# Patient Record
Sex: FEMALE | Employment: OTHER | URBAN - METROPOLITAN AREA
[De-identification: names, ages, dates, MRNs, and addresses within clinical notes are randomized per-mention and may not be internally consistent; named-entity substitution may affect disease eponyms.]

---

## 2017-02-04 ENCOUNTER — APPOINTMENT (OUTPATIENT)
Dept: GENERAL RADIOLOGY | Age: 82
End: 2017-02-04
Attending: EMERGENCY MEDICINE
Payer: MEDICARE

## 2017-02-04 ENCOUNTER — HOSPITAL ENCOUNTER (EMERGENCY)
Age: 82
Discharge: HOME | End: 2017-02-04
Attending: EMERGENCY MEDICINE
Payer: MEDICARE

## 2017-02-04 VITALS
RESPIRATION RATE: 18 BRPM | TEMPERATURE: 97.6 F | DIASTOLIC BLOOD PRESSURE: 87 MMHG | HEART RATE: 77 BPM | OXYGEN SATURATION: 95 % | SYSTOLIC BLOOD PRESSURE: 181 MMHG | WEIGHT: 145 LBS | HEIGHT: 61 IN | BODY MASS INDEX: 27.38 KG/M2

## 2017-02-04 DIAGNOSIS — M25.552 ACUTE PAIN OF LEFT HIP: ICD-10-CM

## 2017-02-04 DIAGNOSIS — M25.532 ACUTE WRIST PAIN, LEFT: ICD-10-CM

## 2017-02-04 DIAGNOSIS — S70.12XA CONTUSION OF LEFT HIP AND THIGH, INITIAL ENCOUNTER: ICD-10-CM

## 2017-02-04 DIAGNOSIS — S70.02XA CONTUSION OF LEFT HIP AND THIGH, INITIAL ENCOUNTER: ICD-10-CM

## 2017-02-04 DIAGNOSIS — S52.602A RADIUS AND ULNA DISTAL FRACTURE, LEFT, CLOSED, INITIAL ENCOUNTER: Primary | ICD-10-CM

## 2017-02-04 DIAGNOSIS — S52.502A RADIUS AND ULNA DISTAL FRACTURE, LEFT, CLOSED, INITIAL ENCOUNTER: Primary | ICD-10-CM
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
## 2017-02-04 LAB
ANION GAP BLD CALC-SCNC: 4 MMOL/L (ref 3–18)
APPEARANCE UR: CLEAR
ATRIAL RATE: 75 BPM
BACTERIA URNS QL MICRO: ABNORMAL /HPF
BASOPHILS # BLD AUTO: 0 K/UL (ref 0–0.06)
BASOPHILS # BLD: 0 % (ref 0–2)
BILIRUB UR QL: NEGATIVE
BUN SERPL-MCNC: 12 MG/DL (ref 7–18)
BUN/CREAT SERPL: 13 (ref 12–20)
CALCIUM SERPL-MCNC: 9.1 MG/DL (ref 8.5–10.1)
CALCULATED P AXIS, ECG09: 37 DEGREES
CALCULATED R AXIS, ECG10: 8 DEGREES
CALCULATED T AXIS, ECG11: 8 DEGREES
CHLORIDE SERPL-SCNC: 104 MMOL/L (ref 100–108)
CO2 SERPL-SCNC: 31 MMOL/L (ref 21–32)
COLOR UR: YELLOW
CREAT SERPL-MCNC: 0.95 MG/DL (ref 0.6–1.3)
DIAGNOSIS, 93000: NORMAL
DIFFERENTIAL METHOD BLD: ABNORMAL
EOSINOPHIL # BLD: 0.3 K/UL (ref 0–0.4)
EOSINOPHIL NFR BLD: 4 % (ref 0–5)
EPITH CASTS URNS QL MICRO: ABNORMAL /LPF (ref 0–5)
ERYTHROCYTE [DISTWIDTH] IN BLOOD BY AUTOMATED COUNT: 14.5 % (ref 11.6–14.5)
GLUCOSE SERPL-MCNC: 97 MG/DL (ref 74–99)
GLUCOSE UR STRIP.AUTO-MCNC: NEGATIVE MG/DL
HCT VFR BLD AUTO: 39.7 % (ref 35–45)
HGB BLD-MCNC: 12.7 G/DL (ref 12–16)
HGB UR QL STRIP: ABNORMAL
INR PPP: 4.2 (ref 0.8–1.2)
KETONES UR QL STRIP.AUTO: NEGATIVE MG/DL
LEUKOCYTE ESTERASE UR QL STRIP.AUTO: ABNORMAL
LYMPHOCYTES # BLD AUTO: 6 % (ref 21–52)
LYMPHOCYTES # BLD: 0.5 K/UL (ref 0.9–3.6)
MCH RBC QN AUTO: 28.9 PG (ref 24–34)
MCHC RBC AUTO-ENTMCNC: 32 G/DL (ref 31–37)
MCV RBC AUTO: 90.2 FL (ref 74–97)
MONOCYTES # BLD: 0.6 K/UL (ref 0.05–1.2)
MONOCYTES NFR BLD AUTO: 9 % (ref 3–10)
NEUTS SEG # BLD: 5.8 K/UL (ref 1.8–8)
NEUTS SEG NFR BLD AUTO: 81 % (ref 40–73)
NITRITE UR QL STRIP.AUTO: NEGATIVE
P-R INTERVAL, ECG05: 198 MS
PH UR STRIP: 6 [PH] (ref 5–8)
PLATELET # BLD AUTO: 190 K/UL (ref 135–420)
PMV BLD AUTO: 10.5 FL (ref 9.2–11.8)
POTASSIUM SERPL-SCNC: 4.4 MMOL/L (ref 3.5–5.5)
PROT UR STRIP-MCNC: NEGATIVE MG/DL
PROTHROMBIN TIME: 38.2 SEC (ref 11.5–15.2)
Q-T INTERVAL, ECG07: 390 MS
QRS DURATION, ECG06: 80 MS
QTC CALCULATION (BEZET), ECG08: 435 MS
RBC # BLD AUTO: 4.4 M/UL (ref 4.2–5.3)
RBC #/AREA URNS HPF: ABNORMAL /HPF (ref 0–5)
SODIUM SERPL-SCNC: 139 MMOL/L (ref 136–145)
SP GR UR REFRACTOMETRY: 1.01 (ref 1–1.03)
UROBILINOGEN UR QL STRIP.AUTO: 0.2 EU/DL (ref 0.2–1)
VENTRICULAR RATE, ECG03: 75 BPM
WBC # BLD AUTO: 7.1 K/UL (ref 4.6–13.2)
WBC URNS QL MICRO: ABNORMAL /HPF (ref 0–4)

## 2017-02-04 PROCEDURE — 73100 X-RAY EXAM OF WRIST: CPT

## 2017-02-04 PROCEDURE — 74011250637 HC RX REV CODE- 250/637: Performed by: EMERGENCY MEDICINE

## 2017-02-04 PROCEDURE — 73502 X-RAY EXAM HIP UNI 2-3 VIEWS: CPT

## 2017-02-04 PROCEDURE — 85610 PROTHROMBIN TIME: CPT | Performed by: EMERGENCY MEDICINE

## 2017-02-04 PROCEDURE — L3670 SO ACRO/CLAV CAN WEB PRE OTS: HCPCS

## 2017-02-04 PROCEDURE — 75810000053 HC SPLINT APPLICATION

## 2017-02-04 PROCEDURE — 80048 BASIC METABOLIC PNL TOTAL CA: CPT | Performed by: EMERGENCY MEDICINE

## 2017-02-04 PROCEDURE — 93005 ELECTROCARDIOGRAM TRACING: CPT

## 2017-02-04 PROCEDURE — 81001 URINALYSIS AUTO W/SCOPE: CPT | Performed by: EMERGENCY MEDICINE

## 2017-02-04 PROCEDURE — 85025 COMPLETE CBC W/AUTO DIFF WBC: CPT | Performed by: EMERGENCY MEDICINE

## 2017-02-04 PROCEDURE — 99284 EMERGENCY DEPT VISIT MOD MDM: CPT

## 2017-02-04 RX ORDER — HYDROCODONE BITARTRATE AND ACETAMINOPHEN 5; 325 MG/1; MG/1
1 TABLET ORAL
Qty: 20 TAB | Refills: 0 | Status: SHIPPED | OUTPATIENT
Start: 2017-02-04 | End: 2019-01-31

## 2017-02-04 RX ORDER — HYDROCODONE BITARTRATE AND ACETAMINOPHEN 5; 325 MG/1; MG/1
1 TABLET ORAL
Status: COMPLETED | OUTPATIENT
Start: 2017-02-04 | End: 2017-02-04

## 2017-02-04 RX ORDER — LISINOPRIL 10 MG/1
10 TABLET ORAL DAILY
COMMUNITY
End: 2019-01-31

## 2017-02-04 RX ADMIN — HYDROCODONE BITARTRATE AND ACETAMINOPHEN 1 TABLET: 5; 325 TABLET ORAL at 10:35

## 2017-02-04 NOTE — ED NOTES
Patient and family aware that Xrays are resulted and awaiting MD review. Pt and family verbalized understanding. No needs voiced at this time.

## 2017-02-04 NOTE — ED NOTES
I have reviewed discharge instructions with the patient. The patient verbalized understanding. Current Discharge Medication List      START taking these medications    Details   HYDROcodone-acetaminophen (NORCO) 5-325 mg per tablet Take 1 Tab by mouth every six (6) hours as needed for Pain. Max Daily Amount: 4 Tabs. May break the tablets in half. Qty: 20 Tab, Refills: 0         CONTINUE these medications which have NOT CHANGED    Details   lisinopril (PRINIVIL, ZESTRIL) 10 mg tablet Take 10 mg by mouth daily. warfarin (COUMADIN) 3 mg tablet Take 3 mg by mouth every other day. simvastatin (ZOCOR) 20 mg tablet Take 20 mg by mouth nightly. Indications: DYSLIPIDEMIA      ferrous sulfate 300 mg (60 mg iron)/5 mL syrup Take 5 mL by mouth daily (with breakfast).   Qty: 60 mL, Refills: 0    Associated Diagnoses: Postoperative anemia due to acute blood loss         Patient armband removed and shredded

## 2017-02-04 NOTE — ED PROVIDER NOTES
HPI Comments: 9:14 AM Nba Ramos is a 80 y.o. female with a h/o a left hip fracture repair, DVT, and HTN presents to the ED with complaints of left wrist pain and left hip pain. Her  states that she slipped and fell onto her left side in the  area at [employer] two evenings ago. She states that she doesn't remember the fall. He believes she had a \"dizzy spell\" and does not know if she really slipped or if she passed out prior to falling. She landed on her left side and was awake and alert after the fall. She denies hitting her head. No headache. No neck pain. No unusual back pain. No fever, chest pain, SOB, cough, nausea, vomiting, diarrhea, urinary symptoms, and/or rash. No blood nor fluid from her nose nor ears. Teeth do not feel loose nor chipped. She is also complaining of left hip/leg pain s/p fall. Her  notes that she slept most of yesterday, and believes her \"dizzy spells\" are due to a 'potassium issue\". Her daughter-in-law noted that her mother-in-law is on Warfarin and that she did have a UTI last summer. The patient is a poor historian. No further symptoms or concerns expressed at this time. Pt's PCP is Mikayla Tavares MD            The history is provided by the patient, the spouse and a relative. Past Medical History:   Diagnosis Date    Anticoagulated on Coumadin     Anticoagulation goal of INR 2 to 3     Decreased calculated glomerular filtration rate (GFR) 8/1/2016     Calculated GFR equivalent to that of CKD stage 3 = 30-59 ml/min    DVT (deep venous thrombosis) (HCC)     Dyslipidemia     Essential hypertension     Glaucoma     Osteoporosis     Postoperative anemia due to acute blood loss 7/29/2016       No past surgical history on file.       Family History:   Problem Relation Age of Onset    Heart Disease Mother     Diabetes Mother     Arthritis-osteo Father     Cancer Brother        Social History     Social History    Marital status:      Spouse name: N/A    Number of children: N/A    Years of education: N/A     Occupational History    Not on file. Social History Main Topics    Smoking status: Never Smoker    Smokeless tobacco: Never Used    Alcohol use No    Drug use: No    Sexual activity: Not on file     Other Topics Concern    Not on file     Social History Narrative         ALLERGIES: Review of patient's allergies indicates no known allergies. Review of Systems   Constitutional: Negative for chills and fever. HENT: Negative for nosebleeds. Respiratory: Negative for cough and shortness of breath. Cardiovascular: Negative for chest pain. Gastrointestinal: Negative for diarrhea, nausea and vomiting. Genitourinary: Negative for difficulty urinating, dyspareunia, dysuria, enuresis, flank pain, frequency, hematuria, urgency and vaginal pain. Musculoskeletal: Positive for arthralgias (left wrist and left hip). Neurological: Negative for headaches. All other systems reviewed and are negative. Vitals:    02/04/17 0903   BP: 181/87   Pulse: 77   Resp: 18   Temp: 97.6 °F (36.4 °C)   SpO2: 95%   Weight: 65.8 kg (145 lb)   Height: 5' 1\" (1.549 m)            Physical Exam   Constitutional: She is oriented to person, place, and time. She appears well-developed and well-nourished. No distress. HENT:   Head: Normocephalic. Right Ear: External ear normal.   Left Ear: External ear normal.   Nose: Nose normal.   Mouth/Throat: Oropharynx is clear and moist.   Teeth and bite intact. Tongue intact. Eyes: Conjunctivae and EOM are normal. Pupils are equal, round, and reactive to light. Neck: Normal range of motion. Neck supple. No spinous tenderness. No step off. There is some tenderness of the paravertebral muscles bilaterally. Cardiovascular: Normal rate, regular rhythm and normal heart sounds. Pulmonary/Chest: Effort normal and breath sounds normal.   Abdominal: Soft.  Bowel sounds are normal. She exhibits no distension and no mass. There is no tenderness. Musculoskeletal:        Left wrist: She exhibits tenderness (over the distal radius and the distal ulna), swelling (and ecchymoses ) and deformity. She exhibits normal range of motion (ROM increases the pain. ) and no laceration. Left hip: She exhibits tenderness (laterally). She exhibits normal range of motion, no swelling (there is one small ecchymoses over the lateral aspect of the left buttock. ), no crepitus, no deformity and no laceration. Neurological: She is alert and oriented to person, place, and time. Skin: Skin is warm and dry. She is not diaphoretic. Nursing note and vitals reviewed. MDM  Number of Diagnoses or Management Options  Diagnosis management comments: Pt fell 2 evenings ago. Will obtain x-rays. Likely broke her wrist.  Will remove rings from left fingers. Pt states that the pain in her hip is not as bad as the wrist and not nearly as bad as the pain was when she broke it in the past.    Will also check labs and a urine to rule out anemia, electrolyte disturbance, infection as the cause for her fall. Amount and/or Complexity of Data Reviewed  Clinical lab tests: ordered and reviewed      ED Course       Splint, Long Arm  Performed by: Feliz Viveros  Authorized by: Ge GARCÍA     Consent:     Consent obtained:  Verbal    Consent given by:  Patient    Risks discussed:  Numbness and swelling    Alternatives discussed:  No treatment  Pre-procedure details:     Sensation:  Normal    Skin color:  Pink  Procedure details:     Laterality:  Left    Location:  Wrist    Wrist:  L wrist    Strapping: no      Splint type:  Long arm    Supplies:  Elastic bandage and Ortho-Glass  Post-procedure details:     Pain:  Improved    Sensation:  Normal    Skin color:  Pink    Patient tolerance of procedure:   Tolerated well, no immediate complications          Medications ordered:   Medications HYDROcodone-acetaminophen (NORCO) 5-325 mg per tablet 1 Tab (1 Tab Oral Given 2/4/17 1035)         Lab findings:  Labs Reviewed   URINALYSIS W/ RFLX MICROSCOPIC - Abnormal; Notable for the following:        Result Value    Blood SMALL (*)     Leukocyte Esterase SMALL (*)     All other components within normal limits   CBC WITH AUTOMATED DIFF - Abnormal; Notable for the following:     NEUTROPHILS 81 (*)     LYMPHOCYTES 6 (*)     ABS. LYMPHOCYTES 0.5 (*)     All other components within normal limits   METABOLIC PANEL, BASIC - Abnormal; Notable for the following:     GFR est non-AA 55 (*)     All other components within normal limits   PROTHROMBIN TIME + INR - Abnormal; Notable for the following:     Prothrombin time 38.2 (*)     INR 4.2 (*)     All other components within normal limits   URINE MICROSCOPIC ONLY - Abnormal; Notable for the following:     Bacteria FEW (*)     All other components within normal limits       EKG interpretation:  NSR, rate of 75, normal EKG. X-Ray, CT or other radiology findings or impressions:  XR WRIST LT AP/LAT   Final Result      XR HIP LT W OR WO PELV 2-3 VWS   Final Result      Left wrist = +non-displaced fracture of the distal radius and ulna. The radius is impacted on itself. No dislocation. Left hip = no fracture, no dislocation. +pin and kirill in place in the proximal femur. Progress notes, Consult notes or additional Procedure notes:   Rx for Norco 5mg, take one tablet every 6 hours or Acetaminophen 325mg, take 2 tablets every 6 hours. Danger of Tylenol and liver toxicity discussed. No ASA nor NSAIDS due to the pt taking Coumadin. INR is 4.2. Pt and daughter-in-law advised and given a copy of the result. They are to call her PCP today for instructions about Warfarin dosing. Follow up with orthopedist on Monday. Keep left wrist/arm splinted until seen by orthopedist.     Reevaluation of patient:   I have reevaluated patient.   Patient is feeling better s/p splint application. Distal NV supply into the left fingers is WNL s/p splint application. Dispo:  Patient was discharged to home in stable condition. Patient is to return to emergency department with any new or worsening condition. Scribe Attestation:   I Agnieszka Bird, am scribing for and in the presence of Zoe Guerrero MD on this day 02/04/17 at [place] [id number], Scribe    Provider Attestation:  Personally performed the services described in the documentation, reviewed the documentation, as recorded by the scribe in my presence, and it accurately and completely records my words and actions.   Zoe Guerrero MD. 9:15 AM    Signed by: Desire Aguilera, 02/04/17 , 9:15 AM

## 2017-02-04 NOTE — ED TRIAGE NOTES
Triage: pt with ground level fall 2 days ago. Bruising and swelling to left wrist, pain to left hip.

## 2017-02-04 NOTE — ED NOTES
Warm blanket provided for comfort. Pt and family aware awaiting x ray results, both verbalized understanding. Pt denies any further needs from RN and remains in NAD.

## 2017-02-04 NOTE — DISCHARGE INSTRUCTIONS
Broken Wrist: Care Instructions  Your Care Instructions    Your wrist can break, or fracture, during sports, a fall, or other accidents. The break may happen when your wrist is hit or is used to protect you in a fall. Fractures can range from a small, hairline crack, to a bone or bones broken into two or more pieces. Your treatment depends on how bad the break is. Your doctor may have put your wrist in a cast or splint. This will help keep your wrist stable until your follow-up appointment. It may take weeks or months for your wrist to heal. You can help it heal with care at home. You heal best when you take good care of yourself. Eat a variety of healthy foods, and don't smoke. Follow-up care is a key part of your treatment and safety. Be sure to make and go to all appointments, and call your doctor if you are having problems. It's also a good idea to know your test results and keep a list of the medicines you take. How can you care for yourself at home? · Put ice or a cold pack on your wrist for 10 to 20 minutes at a time. Try to do this every 1 to 2 hours for the next 3 days (when you are awake). Put a thin cloth between the ice and your cast or splint. Keep your cast or splint dry. · Follow the splint or cast care instructions your doctor gives you. If you have a splint, do not take it off unless your doctor tells you to. Be careful not to put the splint on too tight. · Be safe with medicines. Take pain medicines exactly as directed. ¨ If the doctor gave you a prescription medicine for pain, take it as prescribed. ¨ If you are not taking a prescription pain medicine, ask your doctor if you can take an over-the-counter medicine. · Prop up your wrist on pillows when you sit or lie down in the first few days after the injury. Keep your wrist higher than the level of your heart. This will help reduce swelling.   · Move your fingers often to reduce swelling and stiffness, but do not use that hand to grab or carry anything. · Follow instructions for exercises to keep your arm strong. When should you call for help? Call your doctor now or seek immediate medical care if:  · You have increased or severe pain. · Your cast or splint feels too tight. · You cannot move your fingers. · You have tingling, weakness, or numbness in your hand and fingers. · Your hand and fingers are cool or pale or change color. · You have a lot of swelling near your cast or splint. · The skin under your cast or splint is burning or stinging. Watch closely for changes in your health, and be sure to contact your doctor if:  · You do not get better as expected. Where can you learn more? Go to http://colleen-dusty.info/. Enter 06-96023618 in the search box to learn more about \"Broken Wrist: Care Instructions. \"  Current as of: May 23, 2016  Content Version: 11.1  © 9623-7900 [employer]. Care instructions adapted under license by [employer] (which disclaims liability or warranty for this information). If you have questions about a medical condition or this instruction, always ask your healthcare professional. [place] [address] any warranty or liability for your use of this information. Preventing Falls: Care Instructions  Your Care Instructions  Getting around your home safely can be a challenge if you have injuries or health problems that make it easy for you to fall. Loose rugs and furniture in walkways are among the dangers for many older people who have problems walking or who have poor eyesight. People who have conditions such as arthritis, osteoporosis, or dementia also have to be careful not to fall. You can make your home safer with a few simple measures. Follow-up care is a key part of your treatment and safety. Be sure to make and go to all appointments, and call your doctor if you are having problems.  It's also a good idea to know your test results and keep a list of the medicines you take. How can you care for yourself at home? Taking care of yourself  · You may get dizzy if you do not drink enough water. To prevent dehydration, drink plenty of fluids, enough so that your urine is light yellow or clear like water. Choose water and other caffeine-free clear liquids. If you have kidney, heart, or liver disease and have to limit fluids, talk with your doctor before you increase the amount of fluids you drink. · Exercise regularly to improve your strength, muscle tone, and balance. Walk if you can. Swimming may be a good choice if you cannot walk easily. · Have your vision and hearing checked each year or any time you notice a change. If you have trouble seeing and hearing, you might not be able to avoid objects and could lose your balance. · Know the side effects of the medicines you take. Ask your doctor or pharmacist whether the medicines you take can affect your balance. Sleeping pills or sedatives can affect your balance. · Limit the amount of alcohol you drink. Alcohol can impair your balance and other senses. · Ask your doctor whether calluses or corns on your feet need to be removed. If you wear loose-fitting shoes because of calluses or corns, you can lose your balance and fall. · Talk to your doctor if you have numbness in your feet. Preventing falls at home  · Remove raised doorway thresholds, throw rugs, and clutter. Repair loose carpet or raised areas in the floor. · Move furniture and electrical cords to keep them out of walking paths. · Use nonskid floor wax, and wipe up spills right away, especially on ceramic tile floors. · If you use a walker or cane, put rubber tips on it. If you use crutches, clean the bottoms of them regularly with an abrasive pad, such as steel wool. · Keep your house well lit, especially [employer], and outside walkways. Use night-lights in areas such as hallways and bathrooms.  Add extra light switches or use remote switches (such as switches that go on or off when you clap your hands) to make it easier to turn lights on if you have to get up during the night. · Install sturdy handrails on stairways. · Move items in your cabinets so that the things you use a lot are on the lower shelves (about waist level). · Keep a cordless phone and a flashlight with new batteries by your bed. If possible, put a phone in each of the main rooms of your house, or carry a cell phone in case you fall and cannot reach a phone. Or, you can wear a device around your neck or wrist. You push a button that sends a signal for help. · Wear low-heeled shoes that fit well and give your feet good support. Use footwear with nonskid soles. Check the heels and soles of your shoes for wear. Repair or replace worn heels or soles. · Do not wear socks without shoes on wood floors. · Walk on the grass when the sidewalks are slippery. If you live in an area that gets snow and ice in the winter, sprinkle salt on slippery steps and sidewalks. Preventing falls in the bath  · Install grab bars and nonskid mats inside and outside your shower or tub and near the toilet and sinks. · Use shower chairs and bath benches. · Use a hand-held shower head that will allow you to sit while showering. · Get into a tub or shower by putting the weaker leg in first. Get out of a tub or shower with your strong side first.  · Repair loose toilet seats and consider installing a raised toilet seat to make getting on and off the toilet easier. · Keep your bathroom door unlocked while you are in the shower. Where can you learn more? Go to http://colleen-dusty.info/. Enter [id number] [id number] in the search box to learn more about \"Preventing Falls: Care Instructions. \"  Current as of: August 4, 2016  Content Version: 11.1  © 3495-8846 [employer].  Care instructions adapted under license by [employer] (which disclaims liability or warranty for this information). If you have questions about a medical condition or this instruction, always ask your healthcare professional. [place] [address] any warranty or liability for your use of this information.

## 2017-02-04 NOTE — ED NOTES
Bedside and Verbal shift change report given to Jacinda Dias RN (oncoming nurse) by Carlos Chavez RN (offgoing nurse). Report included the following information ED Summary.

## 2017-02-07 ENCOUNTER — OFFICE VISIT (OUTPATIENT)
Dept: ORTHOPEDIC SURGERY | Age: 82
End: 2017-02-07

## 2017-02-07 VITALS
DIASTOLIC BLOOD PRESSURE: 88 MMHG | HEART RATE: 98 BPM | RESPIRATION RATE: 15 BRPM | HEIGHT: 61 IN | BODY MASS INDEX: 27.38 KG/M2 | SYSTOLIC BLOOD PRESSURE: 143 MMHG | WEIGHT: 145 LBS

## 2017-02-07 DIAGNOSIS — S52.592A OTHER CLOSED FRACTURE OF DISTAL END OF LEFT RADIUS, INITIAL ENCOUNTER: Primary | ICD-10-CM

## 2017-02-07 RX ORDER — BRIMONIDINE TARTRATE, TIMOLOL MALEATE 2; 5 MG/ML; MG/ML
1 SOLUTION/ DROPS OPHTHALMIC EVERY 12 HOURS
COMMUNITY
End: 2020-01-08

## 2017-02-07 RX ORDER — ACETAMINOPHEN 325 MG/1
TABLET ORAL
COMMUNITY
End: 2020-03-02

## 2017-02-07 NOTE — PROGRESS NOTES
Nanci Perez  4/22/1926   Chief Complaint   Patient presents with    Wrist Injury     left wrist        HISTORY OF PRESENT ILLNESS  Nanci Perez is a 80 y.o. female who presents today for evaluation of left wrist pain. She presents today in a wheel chair wearing a splint on the left arm. She states she fell on Thursday and was seen in the [facility] ER where they put her arm in the splint. She states her pain has improved over the last few days. She rates her pain a 5/10 today. No Known Allergies     Past Medical History   Diagnosis Date    Anticoagulated on Coumadin     Anticoagulation goal of INR 2 to 3     Decreased calculated glomerular filtration rate (GFR) 8/1/2016     Calculated GFR equivalent to that of CKD stage 3 = 30-59 ml/min    DVT (deep venous thrombosis) (HCC)     Dyslipidemia     Essential hypertension     Glaucoma     Osteoporosis     Postoperative anemia due to acute blood loss 7/29/2016      Social History     Social History    Marital status:      Spouse name: N/A    Number of children: N/A    Years of education: N/A     Occupational History    Not on file. Social History Main Topics    Smoking status: Never Smoker    Smokeless tobacco: Never Used    Alcohol use No    Drug use: No    Sexual activity: Not on file     Other Topics Concern    Not on file     Social History Narrative      History reviewed. No pertinent past surgical history. Family History   Problem Relation Age of Onset    Heart Disease Mother     Diabetes Mother     Arthritis-osteo Father     Cancer Brother       Current Outpatient Prescriptions   Medication Sig    acetaminophen (TYLENOL) 325 mg tablet Take  by mouth every four (4) hours as needed for Pain.  bimatoprost (LUMIGAN) 0.01 % ophthalmic drops Administer 1 Drop to both eyes every evening.  brimonidine-timolol (COMBIGAN) 0.2-0.5 % drop ophthalmic solution 1 Drop every twelve (12) hours.     lisinopril (PRINIVIL, ZESTRIL) 10 mg tablet Take 10 mg by mouth daily.  warfarin (COUMADIN) 3 mg tablet Take 3 mg by mouth every other day.  ferrous sulfate 300 mg (60 mg iron)/5 mL syrup Take 5 mL by mouth daily (with breakfast).  simvastatin (ZOCOR) 20 mg tablet Take 20 mg by mouth nightly. Indications: DYSLIPIDEMIA    HYDROcodone-acetaminophen (NORCO) 5-325 mg per tablet Take 1 Tab by mouth every six (6) hours as needed for Pain. Max Daily Amount: 4 Tabs. May break the tablets in half. No current facility-administered medications for this visit. REVIEW OF SYSTEM   Patient denies: Weight loss, Fever/Chills, HA, Visual changes, Fatigue, Chest pain, SOB, Abdominal pain, N/V/D/C, Blood in stool or urine, Edema. Pertinent positive as above in HPI. All others were negative    PHYSICAL EXAM:   Visit Vitals    /88    Pulse 98    Resp 15    Ht 5' 1\" (1.549 m)    Wt 145 lb (65.8 kg)    BMI 27.4 kg/m2     The patient is a well-developed, well-nourished female   in no acute distress. The patient is alert and oriented times three. The patient is alert and oriented times three. Mood and affect are normal.  LYMPHATIC: lymph nodes are not enlarged and are within normal limits  SKIN: normal in color and non tender to palpation. There are no bruises or abrasions noted. NEUROLOGICAL: Motor sensory exam is within normal limits. Reflexes are equal bilaterally.  There is normal sensation to pinprick and light touch  MUSCULOSKELETAL:  Examination Left Hand   Skin Intact   Deformity -   Swelling -   Tenderness -   Tenderness A1 Pulley -   Finger flexion Full   Finger extension Full   Thenar Eminence Atrophy -   Sensation Normal   Capillary refill -   Heberden's nodes -   Dupuytren's -     Examination Left Wrist   Skin Intact   Tenderness -   Flexion 60   Extension 60   Deformity -   Effusion -   Tinnel's sign -   Phalen's test -   Finklestein maneuver -   Pain with thumb abduction -       IMAGING: XRs of the left wrist dated 2/4/2017 was reviewed and read: Nondisplaced fracture of the distal radius. Impression:  1. No definite distal radius impaction fracture. There is slight irregularity  on lateral view without definite cortical step-off or break identified. However,  correlate clinically for point tenderness overlying the distal radius. If  clinical concern remains, consider obtaining additional 4 view wrist series. 2. Slight lucency at the base of the ulnar styloid. A nondisplaced fracture  cannot be entirely excluded. Correlate for point tenderness. 3. Ossific densities overlying the dorsal aspect of the distal radius and  proximal carpal bones. These may be sequela of prior injury.       IMPRESSION:      ICD-10-CM ICD-9-CM    1. Other closed fracture of distal end of left radius, initial encounter S52.592A 813.42 acetaminophen (TYLENOL) 325 mg tablet      bimatoprost (LUMIGAN) 0.01 % ophthalmic drops      brimonidine-timolol (COMBIGAN) 0.2-0.5 % drop ophthalmic solution      CAST SUP SHT ARM ADULT FBRGL      [place] APPLY FOREARM CAST      AMB SUPPLY ORDER        PLAN: Patient sustained a nondisplaced fracture of the distal radius. A cast was applied to the patients arm today. I advised her to begin using a four point cane. She will follow up in two weeks for reevaluation.   Follow-up Disposition: Not on File    Scribed by Chencho Arroyo [facility]) as dictated by MD Morgan Camara M.D.   [facility]

## 2017-02-15 ENCOUNTER — OFFICE VISIT (OUTPATIENT)
Dept: ORTHOPEDIC SURGERY | Age: 82
End: 2017-02-15

## 2017-02-15 VITALS
TEMPERATURE: 97.4 F | SYSTOLIC BLOOD PRESSURE: 124 MMHG | HEART RATE: 90 BPM | RESPIRATION RATE: 15 BRPM | HEIGHT: 61 IN | WEIGHT: 145 LBS | BODY MASS INDEX: 27.38 KG/M2 | DIASTOLIC BLOOD PRESSURE: 81 MMHG

## 2017-02-15 DIAGNOSIS — S62.102D: Primary | ICD-10-CM

## 2017-02-15 NOTE — PROGRESS NOTES
Sandy Ray  4/22/1926   Chief Complaint   Patient presents with    Wrist Injury     left wrist fracture        HISTORY OF PRESENT ILLNESS  Sandy Ray is a 80 y.o. female who presents today for evaluation of left wrist fracture. She recalls she fell on 2/2/17 and was seen in the [facility] where they put her arm in the splint. She states she is in no pain today. She has seen a decrease in swelling in the wrist.     No Known Allergies     Past Medical History   Diagnosis Date    Anticoagulated on Coumadin     Anticoagulation goal of INR 2 to 3     Decreased calculated glomerular filtration rate (GFR) 8/1/2016     Calculated GFR equivalent to that of CKD stage 3 = 30-59 ml/min    DVT (deep venous thrombosis) (HCC)     Dyslipidemia     Essential hypertension     Glaucoma     Osteoporosis     Postoperative anemia due to acute blood loss 7/29/2016      Social History     Social History    Marital status:      Spouse name: N/A    Number of children: N/A    Years of education: N/A     Occupational History    Not on file. Social History Main Topics    Smoking status: Never Smoker    Smokeless tobacco: Never Used    Alcohol use No    Drug use: No    Sexual activity: Not on file     Other Topics Concern    Not on file     Social History Narrative      History reviewed. No pertinent past surgical history. Family History   Problem Relation Age of Onset    Heart Disease Mother     Diabetes Mother     Arthritis-osteo Father     Cancer Brother       Current Outpatient Prescriptions   Medication Sig    acetaminophen (TYLENOL) 325 mg tablet Take  by mouth every four (4) hours as needed for Pain.  bimatoprost (LUMIGAN) 0.01 % ophthalmic drops Administer 1 Drop to both eyes every evening.  brimonidine-timolol (COMBIGAN) 0.2-0.5 % drop ophthalmic solution 1 Drop every twelve (12) hours.  lisinopril (PRINIVIL, ZESTRIL) 10 mg tablet Take 10 mg by mouth daily.     warfarin (COUMADIN) 3 mg tablet Take 3 mg by mouth every other day.  ferrous sulfate 300 mg (60 mg iron)/5 mL syrup Take 5 mL by mouth daily (with breakfast).  simvastatin (ZOCOR) 20 mg tablet Take 20 mg by mouth nightly. Indications: DYSLIPIDEMIA    HYDROcodone-acetaminophen (NORCO) 5-325 mg per tablet Take 1 Tab by mouth every six (6) hours as needed for Pain. Max Daily Amount: 4 Tabs. May break the tablets in half. No current facility-administered medications for this visit. REVIEW OF SYSTEM   Patient denies: Weight loss, Fever/Chills, HA, Visual changes, Fatigue, Chest pain, SOB, Abdominal pain, N/V/D/C, Blood in stool or urine, Edema. Pertinent positive as above in HPI. All others were negative    PHYSICAL EXAM:   Visit Vitals    /81    Pulse 90    Temp 97.4 °F (36.3 °C)    Resp 15    Ht 5' 1\" (1.549 m)    Wt 145 lb (65.8 kg)    BMI 27.4 kg/m2     The patient is a well-developed, well-nourished female   in no acute distress. The patient is alert and oriented times three. The patient is alert and oriented times three. Mood and affect are normal.  LYMPHATIC: lymph nodes are not enlarged and are within normal limits  SKIN: normal in color and non tender to palpation. There are no bruises or abrasions noted. NEUROLOGICAL: Motor sensory exam is within normal limits. Reflexes are equal bilaterally.  There is normal sensation to pinprick and light touch  MUSCULOSKELETAL:  Examination Left Hand   Skin Intact   Deformity -   Swelling -   Tenderness -   Tenderness A1 Pulley -   Finger flexion Full   Finger extension Full   Thenar Eminence Atrophy -   Sensation Normal   Capillary refill -   Heberden's nodes -   Dupuytren's -     Examination Left Wrist   Skin Intact   Tenderness -   Flexion 60   Extension 60   Deformity -   Effusion -   Tinnel's sign -   Phalen's test -   Finklestein maneuver -   Pain with thumb abduction -       IMAGING: XR of the right wrist dated 2/15/17 was reviewed and read: distal radius fracture with no change in position. IMPRESSION:      ICD-10-CM ICD-9-CM    1. Fracture of left wrist, with routine healing, subsequent encounter S62.102D V54.12 AMB POC XRAY, WRIST; COMPLETE, 3+ VIE      CAST SUP SHT ARM ADULT FBRGL      MA APPLY FOREARM CAST        PLAN: The patient's cast was removed and reapplied today. I will see her back in 2 weeks for reevaluation and new x-rays.   Follow-up Disposition: Not on File    Scribed by Omar Reza [address]) as dictated by MD Jayro Ibanez M.D.   [facility]

## 2017-02-16 ENCOUNTER — OFFICE VISIT (OUTPATIENT)
Dept: ORTHOPEDIC SURGERY | Age: 82
End: 2017-02-16

## 2017-02-16 VITALS
HEIGHT: 61 IN | HEART RATE: 82 BPM | TEMPERATURE: 97.8 F | BODY MASS INDEX: 27.4 KG/M2 | DIASTOLIC BLOOD PRESSURE: 87 MMHG | SYSTOLIC BLOOD PRESSURE: 132 MMHG

## 2017-02-16 DIAGNOSIS — S62.102D: ICD-10-CM

## 2017-02-16 DIAGNOSIS — S72.142D CLOSED COMMINUTED INTERTROCHANTERIC FRACTURE OF LEFT FEMUR WITH ROUTINE HEALING: Primary | ICD-10-CM

## 2017-02-16 RX ORDER — LIDOCAINE HCL 4 G/100G
CREAM TOPICAL
Qty: 76.5 G | Refills: 2 | Status: SHIPPED | OUTPATIENT
Start: 2017-02-16 | End: 2019-01-31

## 2017-02-16 NOTE — PROCEDURES
X-rays, three view of the right wrist revealed: distal radius fracture with slight change in position. Mineralization suggestive of osteopenia.

## 2017-02-16 NOTE — MR AVS SNAPSHOT
Visit Information Date & Time Provider Department Dept. Phone Encounter #  
 2/16/2017  3:00 PM Faith Henriquez, [address] [facility] [id number] Follow-up Instructions Return in about 2 weeks (around 3/2/2017), or if symptoms worsen or fail to improve. Follow up with Dr. Kizzy Lala as scheduled. Adarsh Reasoner Upcoming Health Maintenance Date Due DTaP/Tdap/Td series (1 - Tdap) 4/22/1947 ZOSTER VACCINE AGE 60> 4/22/1986 GLAUCOMA SCREENING Q2Y 4/22/1991 Pneumococcal 65+ Low/Medium Risk (1 of 2 - PCV13) 4/22/1991 MEDICARE YEARLY EXAM 4/22/1991 INFLUENZA AGE 9 TO ADULT 8/1/2016 Allergies as of 2/16/2017  Review Complete On: 2/16/2017 By: Faith Henriquez PA-C No Known Allergies Current Immunizations  Never Reviewed No immunizations on file. Not reviewed this visit You Were Diagnosed With   
  
 Codes Comments Closed comminuted intertrochanteric fracture of left femur with routine healing    -  Primary ICD-10-CM: M48.916V ICD-9-CM: V54.13 Fracture of left wrist, with routine healing, subsequent encounter     ICD-10-CM: S62.102D ICD-9-CM: V54.12 Vitals BP Pulse Temp Height(growth percentile) BMI Smoking Status 132/87 82 97.8 °F (36.6 °C) (Oral) 5' 1\" (1.549 m) 27.4 kg/m2 Never Smoker BMI and BSA Data Body Mass Index Body Surface Area  
 27.4 kg/m 2 1.68 m 2 Preferred Pharmacy Pharmacy Name Phone [facility] [address] [facility], [address] [facility] [phone] Your Updated Medication List  
  
   
This list is accurate as of: 2/16/17  4:39 PM.  Always use your most recent med list.  
  
  
  
  
 bimatoprost 0.01 % ophthalmic drops Commonly known as:  LUMIGAN Administer 1 Drop to both eyes every evening. COMBIGAN 0.2-0.5 % Drop ophthalmic solution Generic drug:  brimonidine-timolol 1 Drop every twelve (12) hours. ferrous sulfate 300 mg (60 mg iron)/5 mL syrup Take 5 mL by mouth daily (with breakfast). HYDROcodone-acetaminophen 5-325 mg per tablet Commonly known as:  Aly Washington Take 1 Tab by mouth every six (6) hours as needed for Pain. Max Daily Amount: 4 Tabs. May break the tablets in half.  
  
 lidocaine 4 % topical cream  
Commonly known as:  XYLOCAINE Apply  to affected area four (4) times daily as needed for Pain. lisinopril 10 mg tablet Commonly known as:  Yareli Sanborn Take 10 mg by mouth daily. simvastatin 20 mg tablet Commonly known as:  ZOCOR Take 20 mg by mouth nightly. Indications: DYSLIPIDEMIA  
  
 TYLENOL 325 mg tablet Generic drug:  acetaminophen Take  by mouth every four (4) hours as needed for Pain.  
  
 warfarin 3 mg tablet Commonly known as:  COUMADIN Take 3 mg by mouth every other day. Prescriptions Printed Refills  
 lidocaine (XYLOCAINE) 4 % topical cream 2 Sig: Apply  to affected area four (4) times daily as needed for Pain. Class: Print Route: Topical  
  
We Performed the Following AMB POC XRAY, WRIST; COMPLETE, 3+ VIE [40644 CPT(R)] Follow-up Instructions Return in about 2 weeks (around 3/2/2017), or if symptoms worsen or fail to improve. Follow up with Dr. Roe Paul as scheduled. Siomara Myers Patient Instructions · Continue Activity modification · OTC Use Biofreeze or Asper Cream with lidocaine as directed as needed for pain · Continue PT/OT · Weight bearing status:  partial weight bearing to the left lower extremity using walker · Please follow up with Dr. Roe Paul in 2 weeks or sooner as needed Broken Wrist: Care Instructions Your Care Instructions Your wrist can break, or fracture, during sports, a fall, or other accidents. The break may happen when your wrist is hit or is used to protect you in a fall.  Fractures can range from a small, hairline crack, to a bone or bones broken into two or more pieces. Your treatment depends on how bad the break is. Your doctor may have put your wrist in a cast or splint. This will help keep your wrist stable until your follow-up appointment. It may take weeks or months for your wrist to heal. You can help it heal with care at home. You heal best when you take good care of yourself. Eat a variety of healthy foods, and don't smoke. Follow-up care is a key part of your treatment and safety. Be sure to make and go to all appointments, and call your doctor if you are having problems. It's also a good idea to know your test results and keep a list of the medicines you take. How can you care for yourself at home? · Put ice or a cold pack on your wrist for 10 to 20 minutes at a time. Try to do this every 1 to 2 hours for the next 3 days (when you are awake). Put a thin cloth between the ice and your cast or splint. Keep your cast or splint dry. · Follow the splint or cast care instructions your doctor gives you. If you have a splint, do not take it off unless your doctor tells you to. Be careful not to put the splint on too tight. · Be safe with medicines. Take pain medicines exactly as directed. ¨ If the doctor gave you a prescription medicine for pain, take it as prescribed. ¨ If you are not taking a prescription pain medicine, ask your doctor if you can take an over-the-counter medicine. · Prop up your wrist on pillows when you sit or lie down in the first few days after the injury. Keep your wrist higher than the level of your heart. This will help reduce swelling. · Move your fingers often to reduce swelling and stiffness, but do not use that hand to grab or carry anything. · Follow instructions for exercises to keep your arm strong. When should you call for help? Call your doctor now or seek immediate medical care if: 
· You have increased or severe pain. · Your cast or splint feels too tight. · You cannot move your fingers. · You have tingling, weakness, or numbness in your hand and fingers. · Your hand and fingers are cool or pale or change color. · You have a lot of swelling near your cast or splint. · The skin under your cast or splint is burning or stinging. Watch closely for changes in your health, and be sure to contact your doctor if: 
· You do not get better as expected. Where can you learn more? Go to http://colleen-dusty.info/. Enter [id number] in the search box to learn more about \"Broken Wrist: Care Instructions. \" Current as of: May 23, 2016 Content Version: 11.1 © 8832-6741 [employer]. Care instructions adapted under license by [employer] (which disclaims liability or warranty for this information). If you have questions about a medical condition or this instruction, always ask your healthcare professional. [address] any warranty or liability for your use of this information. Surgery to Repair a Hip Fracture: What to Expect at Home Your Recovery Surgery for a hip fracture repairs a broken hip bone. When you leave the hospital after surgery, you will probably be walking with crutches or a walker. You may be able to climb a few stairs and get in and out of bed and chairs. But you will need someone to help you at home for the next few weeks or until you have more energy and can move around better. If there is no one to help you at home, you may go to a rehabilitation center or long-term care center. You will go home with a bandage and stitches or staples. You can remove the bandage when your doctor tells you to. Your doctor will remove your stitches or staples 10 days to 3 weeks after your surgery. You may still have some mild pain, and the area may be swollen for 3 to 4 months after surgery. Your doctor will give you medicine for the pain. You will continue the rehabilitation program (rehab) you started in the hospital. The better you do with your rehab exercises, the quicker you will get your strength and movement back. Most people are able to return to work 4 weeks to 4 months after surgery. But it may take 6 months to 1 year for you to fully recover. Some people, especially older people, are never able to move quite as well as they used to. This care sheet gives you a general idea about how long it will take for you to recover. But each person recovers at a different pace. Follow the steps below to get better as quickly as possible. You heal best when you take good care of yourself. Eat a variety of healthy foods, and don't smoke. How can you care for yourself at home? Activity · Rest when you feel tired. You may take a nap, but do not stay in bed all day. · Work with your physical therapist to learn the best way to exercise. You may be able to take frequent, short walks using crutches or a walker. You will probably have to use crutches or a walker for at least 4 to 6 weeks. After that, you may need to use a cane to help you walk. · Do not sit for longer than 30 to 45 minutes at a time. When you sit, use chairs with arms, and do not sit in low chairs. · Sleep on your back with your legs slightly apart or on your side with a pillow between your knees for about 6 weeks or as your doctor tells you. Do not sleep on your stomach or affected hip. · You may need to take sponge baths until your stitches or staples have been removed. You will probably be able to shower 24 hours after they are removed. Ask your doctor when it is okay to bathe or shower. · Ask your doctor when you can drive again. · Most people are able to return to work 4 weeks to 4 months after surgery. · Ask your doctor when it is okay for you to have sex. · Do not lift anything that would make you strain.  This may include heavy grocery bags and milk containers, a heavy briefcase or backpack, cat litter or dog food bags, a vacuum , or a child. Diet · By the time you leave the hospital, you will probably be eating your normal diet. If your stomach is upset, try bland, low-fat foods like plain rice, broiled chicken, toast, and yogurt. Your doctor may recommend that you take iron and vitamin supplements. · Continue to drink plenty of fluids. · Eat healthy foods, and watch your portion sizes. Try to stay at your ideal weight. Too much weight puts more stress on your hip joint. · You may notice that your bowel movements are not regular right after your surgery. This is common. Try to avoid constipation and straining with bowel movements. You may want to take a fiber supplement every day. If you have not had a bowel movement after a couple of days, ask your doctor about taking a mild laxative. · Your doctor may want you to take calcium supplements and eat foods high in calcium, such as milk, cheese, ice cream, and salmon with bones. These help stop bone loss. Orange juice and soy milk with added calcium are also good choices. Medicines · Your doctor will tell you if and when you can restart your medicines. He or she will also give you instructions about taking any new medicines. · If you take blood thinners, such as warfarin (Coumadin), clopidogrel (Plavix), or aspirin, be sure to talk to your doctor. He or she will tell you if and when to start taking those medicines again. Make sure that you understand exactly what your doctor wants you to do. · Your doctor may give you a blood-thinning medicine to prevent blood clots. If you take a blood thinner, be sure you get instructions about how to take your medicine safely. Blood thinners can cause serious bleeding problems. This medicine could be in pill form or as a shot (injection). If a shot is necessary, your doctor will tell you how to do this. · Be safe with medicines. Take pain medicines exactly as directed. ¨ If the doctor gave you a prescription medicine for pain, take it as prescribed. ¨ If you are not taking a prescription pain medicine, ask your doctor if you can take an over-the-counter medicine. · If you think your pain medicine is making you sick to your stomach: 
¨ Take your medicine after meals (unless your doctor has told you not to). ¨ Ask your doctor for a different pain medicine. · If your doctor prescribed antibiotics, take them as directed. Do not stop taking them just because you feel better. You need to take the full course of antibiotics. · Your doctor may also prescribe medicines or calcium supplements to make your bones stronger. Incision care · You will have a bandage over the cut (incision) and staples or stitches. If there is no drainage, most doctors will let you take the bandage off in a few days. · Your doctor will remove the staples or stitches 10 days to 3 weeks after the surgery and replace them with strips of tape. Leave the tape on for a week or until it falls off. Exercise · Your rehab program will include a number of exercises to do. Always do them as your therapist tells you. · Do not do any vigorous exercise for 12 weeks or until your doctor tells you it is okay. Ice and elevation · For pain, put ice or a cold pack on the area for 10 to 20 minutes at a time. Put a thin cloth between the ice and your skin. · Your ankle may swell for about 3 months. Prop up your ankle when you ice it or anytime you sit or lie down. Try to keep it above the level of your heart. This will help reduce swelling. Other instructions · Continue to wear your support stockings as your doctor says. These help to prevent blood clots. The length of time that you will have to wear them depends on your activity level and the amount of swelling you have. Most people wear these stockings for 4 to 6 weeks after surgery. Preventing falls is also very important. To prevent falls: · Arrange furniture so that you will not trip on it. · Get rid of throw rugs, and move electrical cords out of the way. · Walk only in areas with plenty of light. · Put grab bars in showers and bathtubs. · Avoid icy or snowy sidewalks. · Wear shoes with sturdy, flat soles. Follow-up care is a key part of your treatment and safety. Be sure to make and go to all appointments, and call your doctor if you are having problems. It's also a good idea to know your test results and keep a list of the medicines you take. When should you call for help? Call 911 anytime you think you may need emergency care. For example, call if: 
· You passed out (lost consciousness). · You have severe trouble breathing. · You have sudden chest pain and shortness of breath, or you cough up blood. Call your doctor now or seek immediate medical care if: 
· Your leg or foot is cool or pale or changes color. · You cannot feel or move your leg. · You have signs of a blood clot, such as: 
¨ Pain in your calf, back of the knee, thigh, or groin. ¨ Redness and swelling in your leg or groin. · Your incision comes open and begins to bleed, or the bleeding increases. · You feel like your heart is racing or beating irregularly. · You have signs of infection, such as: 
¨ Increased pain, swelling, warmth, or redness. ¨ Red streaks leading from the incision. ¨ Pus draining from the incision. ¨ A fever. Watch closely for any changes in your health, and be sure to contact your doctor if: 
· You do not have a bowel movement after taking a laxative. · You do not get better as expected. Where can you learn more? Go to http://colleen-dusty.info/ Enter I657 in the search box to learn more about \"Surgery to Repair a Hip Fracture: What to Expect at Home. \" 
© 6507-5296 [employer].  Care instructions adapted under license by [address] (which disclaims liability or warranty for this information). This care instruction is for use with your licensed healthcare professional. If you have questions about a medical condition or this instruction, always ask your healthcare professional. [address] any warranty or liability for your use of this information. Content Version: 71.0.008934; Current as of: May 22, 2015 Introducing [facility] [place] [facility] SERVICES! El Brewer introduces [employer] patient portal. Now you can access parts of your medical record, email your doctor's office, and request medication refills online. 1. In your internet browser, go to [email or web address] 2. Click on the First Time User? Click Here link in the Sign In box. You will see the New Member Sign Up page. 3. Enter your [employer] Access Code exactly as it appears below. You will not need to use this code after youve completed the sign-up process. If you do not sign up before the expiration date, you must request a new code. · [employer] Access Code: [email or web address] Expires: 5/5/2017  9:11 AM 
 
4. Enter the last four digits of your Social Security Number (xxxx) and Date of Birth (mm/dd/yyyy) as indicated and click Submit. You will be taken to the next sign-up page. 5. Create a Media Lantern ID. This will be your [employer] login ID and cannot be changed, so think of one that is secure and easy to remember. 6. Create a [employer] password. You can change your password at any time. 7. Enter your Password Reset Question and Answer. This can be used at a later time if you forget your password. 8. Enter your e-mail address. You will receive e-mail notification when new information is available in [address] 9. Click Sign Up. You can now view and download portions of your medical record. 10. Click the Download Summary menu link to download a portable copy of your medical information. If you have questions, please visit the Frequently Asked Questions section of the [employer] website. Remember, [employer] is NOT to be used for urgent needs. For medical emergencies, dial 911. Now available from your iPhone and Android! Please provide this summary of care documentation to your next provider. Your primary care clinician is listed as [facility]. If you have any questions after today's visit, please call [phone].

## 2017-02-16 NOTE — PATIENT INSTRUCTIONS
· Continue Activity modification    · OTC Use Biofreeze or Asper Cream with lidocaine as directed as needed for pain    · Continue PT/OT    · Weight bearing status:  partial weight bearing to the left lower extremity using walker    · Please follow up with Dr. Chicho Schafer in 2 weeks or sooner as needed         Broken Wrist: Care Instructions  Your Care Instructions    Your wrist can break, or fracture, during sports, a fall, or other accidents. The break may happen when your wrist is hit or is used to protect you in a fall. Fractures can range from a small, hairline crack, to a bone or bones broken into two or more pieces. Your treatment depends on how bad the break is. Your doctor may have put your wrist in a cast or splint. This will help keep your wrist stable until your follow-up appointment. It may take weeks or months for your wrist to heal. You can help it heal with care at home. You heal best when you take good care of yourself. Eat a variety of healthy foods, and don't smoke. Follow-up care is a key part of your treatment and safety. Be sure to make and go to all appointments, and call your doctor if you are having problems. It's also a good idea to know your test results and keep a list of the medicines you take. How can you care for yourself at home? · Put ice or a cold pack on your wrist for 10 to 20 minutes at a time. Try to do this every 1 to 2 hours for the next 3 days (when you are awake). Put a thin cloth between the ice and your cast or splint. Keep your cast or splint dry. · Follow the splint or cast care instructions your doctor gives you. If you have a splint, do not take it off unless your doctor tells you to. Be careful not to put the splint on too tight. · Be safe with medicines. Take pain medicines exactly as directed. ¨ If the doctor gave you a prescription medicine for pain, take it as prescribed.   ¨ If you are not taking a prescription pain medicine, ask your doctor if you can take an over-the-counter medicine. · Prop up your wrist on pillows when you sit or lie down in the first few days after the injury. Keep your wrist higher than the level of your heart. This will help reduce swelling. · Move your fingers often to reduce swelling and stiffness, but do not use that hand to grab or carry anything. · Follow instructions for exercises to keep your arm strong. When should you call for help? Call your doctor now or seek immediate medical care if:  · You have increased or severe pain. · Your cast or splint feels too tight. · You cannot move your fingers. · You have tingling, weakness, or numbness in your hand and fingers. · Your hand and fingers are cool or pale or change color. · You have a lot of swelling near your cast or splint. · The skin under your cast or splint is burning or stinging. Watch closely for changes in your health, and be sure to contact your doctor if:  · You do not get better as expected. Where can you learn more? Go to http://colleen-dusty.info/. Enter [id number] in the search box to learn more about \"Broken Wrist: Care Instructions. \"  Current as of: May 23, 2016  Content Version: 11.1  © 0101-5335 [employer]. Care instructions adapted under license by [employer] (which disclaims liability or warranty for this information). If you have questions about a medical condition or this instruction, always ask your healthcare professional. [place] [address] any warranty or liability for your use of this information. Surgery to Repair a Hip Fracture: What to Expect at Home  Your Recovery     Surgery for a hip fracture repairs a broken hip bone. When you leave the hospital after surgery, you will probably be walking with crutches or a walker. You may be able to climb a few stairs and get in and out of bed and chairs.  But you will need someone to help you at home for the next few weeks or until you have more energy and can move around better. If there is no one to help you at home, you may go to a rehabilitation center or long-term care center. You will go home with a bandage and stitches or staples. You can remove the bandage when your doctor tells you to. Your doctor will remove your stitches or staples 10 days to 3 weeks after your surgery. You may still have some mild pain, and the area may be swollen for 3 to 4 months after surgery. Your doctor will give you medicine for the pain. You will continue the rehabilitation program (rehab) you started in the hospital. The better you do with your rehab exercises, the quicker you will get your strength and movement back. Most people are able to return to work 4 weeks to 4 months after surgery. But it may take 6 months to 1 year for you to fully recover. Some people, especially older people, are never able to move quite as well as they used to. This care sheet gives you a general idea about how long it will take for you to recover. But each person recovers at a different pace. Follow the steps below to get better as quickly as possible. You heal best when you take good care of yourself. Eat a variety of healthy foods, and don't smoke. How can you care for yourself at home? Activity  · Rest when you feel tired. You may take a nap, but do not stay in bed all day. · Work with your physical therapist to learn the best way to exercise. You may be able to take frequent, short walks using crutches or a walker. You will probably have to use crutches or a walker for at least 4 to 6 weeks. After that, you may need to use a cane to help you walk. · Do not sit for longer than 30 to 45 minutes at a time. When you sit, use chairs with arms, and do not sit in low chairs. · Sleep on your back with your legs slightly apart or on your side with a pillow between your knees for about 6 weeks or as your doctor tells you. Do not sleep on your stomach or affected hip.   · You may need to take sponge baths until your stitches or staples have been removed. You will probably be able to shower 24 hours after they are removed. Ask your doctor when it is okay to bathe or shower. · Ask your doctor when you can drive again. · Most people are able to return to work 4 weeks to 4 months after surgery. · Ask your doctor when it is okay for you to have sex. · Do not lift anything that would make you strain. This may include heavy grocery bags and milk containers, a heavy briefcase or backpack, cat litter or dog food bags, a vacuum , or a child. Diet  · By the time you leave the hospital, you will probably be eating your normal diet. If your stomach is upset, try bland, low-fat foods like plain rice, broiled chicken, toast, and yogurt. Your doctor may recommend that you take iron and vitamin supplements. · Continue to drink plenty of fluids. · Eat healthy foods, and watch your portion sizes. Try to stay at your ideal weight. Too much weight puts more stress on your hip joint. · You may notice that your bowel movements are not regular right after your surgery. This is common. Try to avoid constipation and straining with bowel movements. You may want to take a fiber supplement every day. If you have not had a bowel movement after a couple of days, ask your doctor about taking a mild laxative. · Your doctor may want you to take calcium supplements and eat foods high in calcium, such as milk, cheese, ice cream, and salmon with bones. These help stop bone loss. Orange juice and soy milk with added calcium are also good choices. Medicines  · Your doctor will tell you if and when you can restart your medicines. He or she will also give you instructions about taking any new medicines. · If you take blood thinners, such as warfarin (Coumadin), clopidogrel (Plavix), or aspirin, be sure to talk to your doctor. He or she will tell you if and when to start taking those medicines again.  Make sure that you understand exactly what your doctor wants you to do. · Your doctor may give you a blood-thinning medicine to prevent blood clots. If you take a blood thinner, be sure you get instructions about how to take your medicine safely. Blood thinners can cause serious bleeding problems. This medicine could be in pill form or as a shot (injection). If a shot is necessary, your doctor will tell you how to do this. · Be safe with medicines. Take pain medicines exactly as directed. ¨ If the doctor gave you a prescription medicine for pain, take it as prescribed. ¨ If you are not taking a prescription pain medicine, ask your doctor if you can take an over-the-counter medicine. · If you think your pain medicine is making you sick to your stomach:  ¨ Take your medicine after meals (unless your doctor has told you not to). ¨ Ask your doctor for a different pain medicine. · If your doctor prescribed antibiotics, take them as directed. Do not stop taking them just because you feel better. You need to take the full course of antibiotics. · Your doctor may also prescribe medicines or calcium supplements to make your bones stronger. Incision care  · You will have a bandage over the cut (incision) and staples or stitches. If there is no drainage, most doctors will let you take the bandage off in a few days. · Your doctor will remove the staples or stitches 10 days to 3 weeks after the surgery and replace them with strips of tape. Leave the tape on for a week or until it falls off. Exercise  · Your rehab program will include a number of exercises to do. Always do them as your therapist tells you. · Do not do any vigorous exercise for 12 weeks or until your doctor tells you it is okay. Ice and elevation  · For pain, put ice or a cold pack on the area for 10 to 20 minutes at a time. Put a thin cloth between the ice and your skin. · Your ankle may swell for about 3 months.  Prop up your ankle when you ice it or anytime you sit or lie down. Try to keep it above the level of your heart. This will help reduce swelling. Other instructions  · Continue to wear your support stockings as your doctor says. These help to prevent blood clots. The length of time that you will have to wear them depends on your activity level and the amount of swelling you have. Most people wear these stockings for 4 to 6 weeks after surgery. Preventing falls is also very important. To prevent falls:  · Arrange furniture so that you will not trip on it. · Get rid of throw rugs, and move electrical cords out of the way. · Walk only in areas with plenty of light. · Put grab bars in showers and bathtubs. · Avoid icy or snowy sidewalks. · Wear shoes with sturdy, flat soles. Follow-up care is a key part of your treatment and safety. Be sure to make and go to all appointments, and call your doctor if you are having problems. It's also a good idea to know your test results and keep a list of the medicines you take. When should you call for help? Call 911 anytime you think you may need emergency care. For example, call if:  · You passed out (lost consciousness). · You have severe trouble breathing. · You have sudden chest pain and shortness of breath, or you cough up blood. Call your doctor now or seek immediate medical care if:  · Your leg or foot is cool or pale or changes color. · You cannot feel or move your leg. · You have signs of a blood clot, such as:  ¨ Pain in your calf, back of the knee, thigh, or groin. ¨ Redness and swelling in your leg or groin. · Your incision comes open and begins to bleed, or the bleeding increases. · You feel like your heart is racing or beating irregularly. · You have signs of infection, such as:  ¨ Increased pain, swelling, warmth, or redness. ¨ Red streaks leading from the incision. ¨ Pus draining from the incision. ¨ A fever.   Watch closely for any changes in your health, and be sure to contact your doctor if:  · You do not have a bowel movement after taking a laxative. · You do not get better as expected. Where can you learn more? Go to http://colleen-dusty.info/  Enter C981 in the search box to learn more about \"Surgery to Repair a Hip Fracture: What to Expect at Home. \"  © 8246-3762 [employer]. Care instructions adapted under license by [employer] (which disclaims liability or warranty for this information). This care instruction is for use with your licensed healthcare professional. If you have questions about a medical condition or this instruction, always ask your healthcare professional. [place] [address] any warranty or liability for your use of this information.   Content Version: 67.0.426667; Current as of: May 22, 2015

## 2017-02-16 NOTE — PROGRESS NOTES
Patient: Roe Gonzalez                MRN: [id number]       SSN: [id number]  YOB: 1926           AGE: 80 y.o. SEX: female    Lam De La Cruz MD    POST OP OFFICE NOTE  DOS: 7/28/16    HPI:     The patient is a 80 y.o. female who presents today for first follow up visit 6+ months s/p LEFT FEMUR INSERTION INTRA MEDULLARY LONG NAIL/ FRACTURE TABLE/ SYNTHES/ C-ARM. Patient has been WB to the left lower extremity. Patient present with daughter who reports patient doing well until a recent fall. She has some slight left buttock pain. MAC reviewed xrays taken in the ED which revealed that the patient has healed hip fracture with no new fracture noted. Patient sustained a right wrist fracture and was seen in the office on yesterday by Dr. Modesto Hernandez for a right distal radius fracture. She was placed in a cast which the patient removed. Cast was replaced in the office today. Patient will follow up with Dr. Modesto Hernandez as scheduled. She will use topical pain medicine for left hip discomfort. PHYSICAL EXAM:     Visit Vitals    /87    Pulse 82    Temp 97.8 °F (36.6 °C) (Oral)    Ht 5' 1\" (1.549 m)    BMI 27.4 kg/m2       GEN:  Alert, well developed, well nourished, well appearing 80 y.o. female in no acute distress. PSYCH:  Normal affect, mood, and conduct. alert, oriented x 3 alert, oriented x 3, no dementia  EXAMINATION OF: left hip and left knee  DRESSINGS: CDI  DRAINAGE: none  INCISION: Incision looks good, skin well approximated, no dehiscence  SKIN: no edema , no erythema, no  ecchymosis, no warmth    TENDERNESS:  no tenderness to palpation   NEUROVASCULAR:  grossly intact. Positive distal pulses and capillary refill. DVT ASSESSMENT:  The calf is tender to palpation. ROM: not tested    IMPRESSION:     Encounter Diagnoses     ICD-10-CM ICD-9-CM   1. Closed comminuted intertrochanteric fracture of left femur with routine healing S72.142D V54.13   2.  Fracture of left wrist, with routine healing, subsequent encounter S62.102D V54.12       PLAN:       · Short Arm Cast re-applied in the office today    · Continue Activity modification    · OTC Use Biofreeze or Asper Cream with lidocaine as directed as needed for left hip pain    · Weight bearing status:  partial weight bearing to the left lower extremity using walker    · Please follow up with Dr. Josias Dubois in 2 weeks or sooner as needed      Patient expresses understanding of the plan. Patient education provided on post surgical care. REVIEW OF SYSTEMS:     Otherwise as noted in HPI     RADIOGRAPHS & DIAGNOSTIC STUDIES     Results for orders placed or performed in visit on 02/16/17   AMB POC XRAY, WRIST; COMPLETE, 3+ VIE    Narrative    Cat Nath PA-C     2/16/2017  5:18 PM  X-rays, three view of the right wrist revealed: distal radius   fracture with slight change in position. Mineralization   suggestive of osteopenia. 2/4/17 - Examination: Left hip, AP and frog lateral views. Views include the entire  femur.      History: Fall.     Comparison: July 26, 2016. Intraoperative views dated July 28, 2016.     Findings: The bones are osteopenic. There are moderate degenerative changes of  the right hip and mild degenerative changes left hip. There is no pelvic  fracture seen. The left femur intertrochanteric fracture is grossly unchanged in  alignment with increasing callus consistent with early healing. There is prior  ORIF with an intramedullary kirill and interlocking screws extending throughout the  left femur. There is no evidence of new left femur fracture. There is a small  suprapatellar joint effusion in the left knee with tricompartmental  osteoarthritic change.     Overlying bowel gas is unremarkable. There are pelvic phleboliths.     IMPRESSION    1. No new acute fracture.     2. Early healing of the left intertrochanteric femur fracture with prior ORIF  and no evidence of hardware complication.     Cat Nath CARLOS  2/16/2017

## 2017-03-14 ENCOUNTER — OFFICE VISIT (OUTPATIENT)
Dept: ORTHOPEDIC SURGERY | Age: 82
End: 2017-03-14

## 2017-03-14 VITALS
WEIGHT: 145 LBS | SYSTOLIC BLOOD PRESSURE: 141 MMHG | HEART RATE: 70 BPM | TEMPERATURE: 98 F | DIASTOLIC BLOOD PRESSURE: 75 MMHG | BODY MASS INDEX: 27.4 KG/M2

## 2017-03-14 DIAGNOSIS — S52.592D OTHER CLOSED FRACTURE OF DISTAL END OF LEFT RADIUS WITH ROUTINE HEALING, SUBSEQUENT ENCOUNTER: Primary | ICD-10-CM

## 2017-03-14 NOTE — PROGRESS NOTES
Phil Corrales  4/22/1926   Chief Complaint   Patient presents with    Wrist Pain     Left        HISTORY OF PRESENT ILLNESS  Phil Corrales is a 80 y.o. female who presents today for evaluation of left wrist fracture. She recalls she fell on 2/2/17 and was seen in the [facility] ER. She was put in a short arm cast but family notes she continues to remove cast. Presents today with an acewrap on her wrist.   Patient denies any fever, chills, chest pain, shortness of breath or calf pain. There are no new illness or injuries to report since last seen in the office. No changes in medications, allergies, social or family history. PHYSICAL EXAM:   Visit Vitals    /75 (BP 1 Location: Left arm, BP Patient Position: Sitting)    Pulse 70    Temp 98 °F (36.7 °C) (Oral)    Wt 145 lb (65.8 kg)    BMI 27.4 kg/m2     The patient is a well-developed, well-nourished female   in no acute distress. The patient is alert and oriented times three. The patient is alert and oriented times three. Mood and affect are normal.  LYMPHATIC: lymph nodes are not enlarged and are within normal limits  SKIN: normal in color and non tender to palpation. There are no bruises or abrasions noted. NEUROLOGICAL: Motor sensory exam is within normal limits. Reflexes are equal bilaterally.  There is normal sensation to pinprick and light touch  MUSCULOSKELETAL:  Examination Left Hand   Skin Intact   Deformity -   Swelling -   Tenderness NO ttp Distal radius   Tenderness A1 Pulley -   Finger flexion Full   Finger extension Full   Thenar Eminence Atrophy -   Sensation Normal   Capillary refill -   Heberden's nodes -   Dupuytren's -     Examination Left Wrist   Skin Intact   Tenderness -   Flexion 60   Extension 60   Deformity -   Effusion -   Tinnel's sign -   Phalen's test -   Finklestein maneuver -   Pain with thumb abduction -       IMAGING: 3 view xray of right wrist reveal sclerotic changes at fracture site consistent with callus, no change in fracture position    IMPRESSION:      ICD-10-CM ICD-9-CM    1. Other closed fracture of distal end of left radius with routine healing, subsequent encounter S52.592D V54.12 AMB POC XRAY, WRIST; COMPLETE, 3+ VIE        PLAN:   1. Patient improving  2. Will place her in a removable splint today  3.  Cont to encouraged range of motion with her wrist and fingers  RTC 3 weeks if needed    Patient seen and evaluated by Dr. Noris Gagnon today who agrees with treatment plan    CARLOS [facility]

## 2017-07-14 ENCOUNTER — IMPORTED ENCOUNTER (OUTPATIENT)
Dept: URBAN - METROPOLITAN AREA CLINIC 1 | Facility: CLINIC | Age: 82
End: 2017-07-14

## 2017-07-14 PROBLEM — H01.002: Noted: 2017-07-14

## 2017-07-14 PROBLEM — H04.123: Noted: 2017-07-14

## 2017-07-14 PROBLEM — Z96.1: Noted: 2017-07-14

## 2017-07-14 PROBLEM — H26.491: Noted: 2017-07-14

## 2017-07-14 PROBLEM — H16.143: Noted: 2017-07-14

## 2017-07-14 PROBLEM — H01.005: Noted: 2017-07-14

## 2017-07-14 PROBLEM — H40.1133: Noted: 2017-07-14

## 2017-07-14 PROCEDURE — 92012 INTRM OPH EXAM EST PATIENT: CPT

## 2017-07-14 PROCEDURE — 92015 DETERMINE REFRACTIVE STATE: CPT

## 2017-07-14 PROCEDURE — 92083 EXTENDED VISUAL FIELD XM: CPT

## 2017-07-14 NOTE — PATIENT DISCUSSION
1.  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 No progression by OCT OU. Patient to continue with Lumigan OU QHS and Combigan OU TID. Patient advised to be compliant with gtts. Condition was discussed with patient and patient understands. Will continue to monitor patient for any progression in condition. Patient was advised to call us with any problems questions or concerns. 2.  Blepharitis posterior type OU- Improving. Continue daily warm compresses and lid scrubs were recommended. 3. JANN w/ PEK OU- Stable. The continuation of artificial tears were recommended. 4.  PCO  OD- Observe and consider yag cap when pt feels pco visually significant and visual acuity decreases to appropriate level. 5. 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 6. Return for an appointment for a 30/OCT in 6 months with Dr. Romeo Chance.

## 2018-04-10 ENCOUNTER — HOSPITAL ENCOUNTER (EMERGENCY)
Age: 83
Discharge: HOME | End: 2018-04-10
Attending: EMERGENCY MEDICINE
Payer: MEDICARE

## 2018-04-10 ENCOUNTER — APPOINTMENT (OUTPATIENT)
Dept: GENERAL RADIOLOGY | Age: 83
End: 2018-04-10
Attending: EMERGENCY MEDICINE
Payer: MEDICARE

## 2018-04-10 VITALS
HEART RATE: 59 BPM | RESPIRATION RATE: 15 BRPM | HEIGHT: 63 IN | BODY MASS INDEX: 26.58 KG/M2 | TEMPERATURE: 97.5 F | DIASTOLIC BLOOD PRESSURE: 70 MMHG | WEIGHT: 150 LBS | OXYGEN SATURATION: 96 % | SYSTOLIC BLOOD PRESSURE: 198 MMHG

## 2018-04-10 DIAGNOSIS — F03.90 DEMENTIA WITHOUT BEHAVIORAL DISTURBANCE, UNSPECIFIED DEMENTIA TYPE: ICD-10-CM

## 2018-04-10 DIAGNOSIS — Z00.00 WELL ADULT HEALTH CHECK: Primary | ICD-10-CM

## 2018-04-10 LAB
ANION GAP SERPL CALC-SCNC: 4 MMOL/L (ref 3–18)
APPEARANCE UR: CLEAR
ATRIAL RATE: 57 BPM
BASOPHILS # BLD: 0 K/UL (ref 0–0.06)
BASOPHILS NFR BLD: 1 % (ref 0–2)
BILIRUB UR QL: NEGATIVE
BUN SERPL-MCNC: 11 MG/DL (ref 7–18)
BUN/CREAT SERPL: 13 (ref 12–20)
CALCIUM SERPL-MCNC: 9.4 MG/DL (ref 8.5–10.1)
CALCULATED P AXIS, ECG09: 36 DEGREES
CALCULATED R AXIS, ECG10: 8 DEGREES
CALCULATED T AXIS, ECG11: 12 DEGREES
CHLORIDE SERPL-SCNC: 106 MMOL/L (ref 100–108)
CO2 SERPL-SCNC: 30 MMOL/L (ref 21–32)
COLOR UR: YELLOW
CREAT SERPL-MCNC: 0.84 MG/DL (ref 0.6–1.3)
DIAGNOSIS, 93000: NORMAL
DIFFERENTIAL METHOD BLD: ABNORMAL
EOSINOPHIL # BLD: 0.1 K/UL (ref 0–0.4)
EOSINOPHIL NFR BLD: 3 % (ref 0–5)
EPITH CASTS URNS QL MICRO: NORMAL /LPF (ref 0–5)
ERYTHROCYTE [DISTWIDTH] IN BLOOD BY AUTOMATED COUNT: 14.3 % (ref 11.6–14.5)
GLUCOSE SERPL-MCNC: 89 MG/DL (ref 74–99)
GLUCOSE UR STRIP.AUTO-MCNC: NEGATIVE MG/DL
HCT VFR BLD AUTO: 40.7 % (ref 35–45)
HGB BLD-MCNC: 12.9 G/DL (ref 12–16)
HGB UR QL STRIP: NEGATIVE
KETONES UR QL STRIP.AUTO: NEGATIVE MG/DL
LEUKOCYTE ESTERASE UR QL STRIP.AUTO: ABNORMAL
LYMPHOCYTES # BLD: 1 K/UL (ref 0.9–3.6)
LYMPHOCYTES NFR BLD: 26 % (ref 21–52)
MCH RBC QN AUTO: 28.7 PG (ref 24–34)
MCHC RBC AUTO-ENTMCNC: 31.7 G/DL (ref 31–37)
MCV RBC AUTO: 90.4 FL (ref 74–97)
MONOCYTES # BLD: 0.6 K/UL (ref 0.05–1.2)
MONOCYTES NFR BLD: 16 % (ref 3–10)
NEUTS SEG # BLD: 1.9 K/UL (ref 1.8–8)
NEUTS SEG NFR BLD: 54 % (ref 40–73)
NITRITE UR QL STRIP.AUTO: NEGATIVE
P-R INTERVAL, ECG05: 206 MS
PH UR STRIP: 7 [PH] (ref 5–8)
PLATELET # BLD AUTO: 223 K/UL (ref 135–420)
PMV BLD AUTO: 9.9 FL (ref 9.2–11.8)
POTASSIUM SERPL-SCNC: 4.2 MMOL/L (ref 3.5–5.5)
PROT UR STRIP-MCNC: NEGATIVE MG/DL
Q-T INTERVAL, ECG07: 448 MS
QRS DURATION, ECG06: 80 MS
QTC CALCULATION (BEZET), ECG08: 436 MS
RBC # BLD AUTO: 4.5 M/UL (ref 4.2–5.3)
RBC #/AREA URNS HPF: NORMAL /HPF (ref 0–5)
SODIUM SERPL-SCNC: 140 MMOL/L (ref 136–145)
SP GR UR REFRACTOMETRY: 1.01 (ref 1–1.03)
UROBILINOGEN UR QL STRIP.AUTO: 1 EU/DL (ref 0.2–1)
VENTRICULAR RATE, ECG03: 57 BPM
WBC # BLD AUTO: 3.6 K/UL (ref 4.6–13.2)
WBC URNS QL MICRO: NORMAL /HPF (ref 0–4)

## 2018-04-10 PROCEDURE — 81001 URINALYSIS AUTO W/SCOPE: CPT | Performed by: EMERGENCY MEDICINE

## 2018-04-10 PROCEDURE — 71045 X-RAY EXAM CHEST 1 VIEW: CPT

## 2018-04-10 PROCEDURE — 85025 COMPLETE CBC W/AUTO DIFF WBC: CPT | Performed by: EMERGENCY MEDICINE

## 2018-04-10 PROCEDURE — 96360 HYDRATION IV INFUSION INIT: CPT

## 2018-04-10 PROCEDURE — 80048 BASIC METABOLIC PNL TOTAL CA: CPT | Performed by: EMERGENCY MEDICINE

## 2018-04-10 PROCEDURE — 99285 EMERGENCY DEPT VISIT HI MDM: CPT

## 2018-04-10 PROCEDURE — 93005 ELECTROCARDIOGRAM TRACING: CPT

## 2018-04-10 PROCEDURE — 74011250636 HC RX REV CODE- 250/636: Performed by: EMERGENCY MEDICINE

## 2018-04-10 RX ADMIN — SODIUM CHLORIDE 500 ML: 900 INJECTION, SOLUTION INTRAVENOUS at 13:57

## 2018-04-10 NOTE — DISCHARGE INSTRUCTIONS
Helping A Person With Dementia: Care Instructions  Your Care Instructions    Dementia is a loss of mental skills that affects daily life. It is different from mild memory loss that occurs with aging. Dementia can cause problems with memory, thinking clearly, and planning. It is different for everyone. But it usually gets worse slowly. Some people who have dementia can function well for a long time. But at some point it may become hard for the person to care for himself or herself. It can be upsetting to learn that a loved one has this condition. You may be afraid and worried about what will happen. You may wonder how you will care for the person. There is no cure for dementia. But medicine may be able to slow memory loss and improve thinking for a while. Other medicines may help with sleep, depression, and behavior changes. Dementia is different for everyone. In some cases, people can function well for a long time. You can help your loved one by making his or her home life easier and safer. You also need to take care of yourself. Caregiving can be stressful. But support is available to help you and give you a break when you need it. The [employer]'s [employer] offers good information and support. If you are caring for someone with dementia, you can help make life safer and more comfortable. You can also help your loved one make decisions about future care. You may also want to bring up legal and financial issues. These are hard but important conversations to have. Follow-up care is a key part of your loved one's treatment and safety. Be sure to make and go to all appointments, and call your doctor if your loved one is having problems. It's also a good idea to know your loved one's test results and keep a list of the medicines he or she takes. How can you care for your loved one at home? Taking care of the person  · If the person takes medicine for dementia, help him or her take it exactly as prescribed. Call the doctor if you notice any problems with the medicine. · Make a list of the person's medicines. Review it with all of his or her doctors. · Help the person eat a balanced diet. Serve plenty of whole grains, fruits, and vegetables every day. If the person is not hungry at mealtimes, give snacks at midmorning and in the afternoon. Offer drinks such as Boost, Ensure, or Sustacal if the person is losing weight. · Encourage exercise. Walking and other activities may slow the decline of mental ability. Help the person stay active mentally with reading, crossword puzzles, or other hobbies. · Take steps to help if the person is sundowning. This is the restless behavior and trouble with sleeping that may occur in late afternoon and at night. Try not to let the person nap during the day. Offer a glass of warm milk or caffeine-free tea before bedtime. · Develop a routine. Your loved one will feel less frustrated or confused with a clear, simple plan of what to do every day. · Be patient. A task may take the person longer than it used to. · For as long as he or she is able, allow your loved one to make decisions about activities, food, clothing, and other choices. Let him or her be independent, even if tasks take more time or are not done perfectly. Tailor tasks to the person's abilities. For example, if cooking is no longer safe, ask for other help. Your loved one can help set the table, or make simple dishes such as a salad. When the person needs help, offer it gently. Staying safe  · Make your home (or your loved one's home) safe. Tack down rugs, and put no-slip tape in the tub. Install handrails, and put safety switches on stoves and appliances. Keep rooms free of clutter. Make sure walkways around furniture are clear. Do not move furniture around, because the person may become confused. · Use locks on doors and cupboards.  Lock up knives, scissors, medicines, cleaning supplies, and other dangerous things. · Do not let the person drive or cook if he or she can't do it safely. A person with dementia should not drive unless he or she is able to pass an on-road driving test. Your state 's license bureau can do a driving test if there is any question. · Get medical alert jewelry for the person so that you can be contacted if he or she wanders away. If possible, provide a safe place for wandering, such as an enclosed yard or garden. Taking care of yourself  · Ask your doctor about support groups and other resources in your area. · Take care of your health. Be sure to eat healthy foods and get enough rest and exercise. · Take time for yourself. Respite services provide someone to stay with the person for a short time while you get out of the house for a few hours. · Make time for an activity that you enjoy. Read, listen to music, paint, do crafts, or play an instrument, even if it's only for a few minutes a day. · Spend time with family, friends, and others in your support system. When should you call for help? Call 911 anytime you think the person may need emergency care. For example, call if:  ? · The person who has dementia wanders away and you can't find him or her. ? · The person who has dementia is seriously injured. ?Call the doctor now or seek immediate medical care if:  ? · The person suddenly sees things that are not there (hallucinates). ? · The person has a sudden change in his or her behavior. ? Watch closely for changes in the person's health, and be sure to contact the doctor if:  ? · The person has symptoms that could cause injury. ? · The person has problems with his or her medicine. ? · You need more information to care for a person with dementia. ? · You need respite care so you can take a break. Where can you learn more? Go to http://colleen-dusty.info/.   Enter M916 in the search box to learn more about \"Helping A Person With Dementia: Care Instructions. \"  Current as of: May 12, 2017  Content Version: 11.4  © 7623-7775 [employer]. Care instructions adapted under license by [employer] (which disclaims liability or warranty for this information). If you have questions about a medical condition or this instruction, always ask your healthcare professional. [address] any warranty or liability for your use of this information. Well Visit, Over 72: Care Instructions  Your Care Instructions    Physical exams can help you stay healthy. Your doctor has checked your overall health and may have suggested ways to take good care of yourself. He or she also may have recommended tests. At home, you can help prevent illness with healthy eating, regular exercise, and other steps. Follow-up care is a key part of your treatment and safety. Be sure to make and go to all appointments, and call your doctor if you are having problems. It's also a good idea to know your test results and keep a list of the medicines you take. How can you care for yourself at home? · Reach and stay at a healthy weight. This will lower your risk for many problems, such as obesity, diabetes, heart disease, and high blood pressure. · Get at least 30 minutes of exercise on most days of the week. Walking is a good choice. You also may want to do other activities, such as running, swimming, cycling, or playing tennis or team sports. · Do not smoke. Smoking can make health problems worse. If you need help quitting, talk to your doctor about stop-smoking programs and medicines. These can increase your chances of quitting for good. · Protect your skin from too much sun. When you're outdoors from 10 a.m. to 4 p.m., stay in the shade or cover up with clothing and a hat with a wide brim. Wear sunglasses that block UV rays. Even when it's cloudy, put broad-spectrum sunscreen (SPF 30 or higher) on any exposed skin.   · See a dentist one or two times a year for checkups and to have your teeth cleaned. · Wear a seat belt in the car. · Limit alcohol to 2 drinks a day for men and 1 drink a day for women. Too much alcohol can cause health problems. Follow your doctor's advice about when to have certain tests. These tests can spot problems early. For men and women  · Cholesterol. Your doctor will tell you how often to have this done based on your overall health and other things that can increase your risk for heart attack and stroke. · Blood pressure. Have your blood pressure checked during a routine doctor visit. Your doctor will tell you how often to check your blood pressure based on your age, your blood pressure results, and other factors. · Diabetes. Ask your doctor whether you should have tests for diabetes. · Vision. Experts recommend that you have yearly exams for glaucoma and other age-related eye problems. · Hearing. Tell your doctor if you notice any change in your hearing. You can have tests to find out how well you hear. · Colon cancer tests. Keep having colon cancer tests as your doctor recommends. You can have one of several types of tests. · Heart attack and stroke risk. At least every 4 to 6 years, you should have your risk for heart attack and stroke assessed. Your doctor uses factors such as your age, blood pressure, cholesterol, and whether you smoke or have diabetes to show what your risk for a heart attack or stroke is over the next 10 years. · Osteoporosis. Talk to your doctor about whether you should have a bone density test to find out whether you have thinning bones. Also ask your doctor about whether you should take calcium and vitamin D supplements. For women  · Pap test and pelvic exam. You may no longer need a Pap test. Talk with your doctor about whether to stop or continue to have Pap tests. · Breast exam and mammogram. Ask how often you should have a mammogram, which is an X-ray of your breasts.  A mammogram can spot breast cancer before it can be felt and when it is easiest to treat. · Thyroid disease. Talk to your doctor about whether to have your thyroid checked as part of a regular physical exam. Women have an increased chance of a thyroid problem. For men  · Prostate exam. Talk to your doctor about whether you should have a blood test (called a PSA test) for prostate cancer. Experts disagree on whether men should have this test. Some experts recommend that you discuss the benefits and risks of the test with your doctor. · Abdominal aortic aneurysm. Ask your doctor whether you should have a test to check for an aneurysm. You may need a test if you ever smoked or if your parent, brother, sister, or child has had an aneurysm. When should you call for help? Watch closely for changes in your health, and be sure to contact your doctor if you have any problems or symptoms that concern you. Where can you learn more? Go to http://colleen-dusty.info/. Enter B183 in the search box to learn more about \"Well Visit, Over 65: Care Instructions. \"  Current as of: May 12, 2017  Content Version: 11.4  © 3932-4714 [employer]. Care instructions adapted under license by [employer] (which disclaims liability or warranty for this information). If you have questions about a medical condition or this instruction, always ask your healthcare professional. [address] any warranty or liability for your use of this information.

## 2018-04-10 NOTE — ED PROVIDER NOTES
EMERGENCY DEPARTMENT HISTORY AND PHYSICAL EXAM    1:35 PM      Date: 4/10/2018  Patient Name: Yogi Blue    History of Presenting Illness     Chief Complaint   Patient presents with    Unresponsive         History Provided By: Patient, Patient's Daughter-In-Law, and EMS    Chief Complaint: Mariella Livers  Duration:   Timing:    Location:   Quality:   Severity:   Modifying Factors: lives in independent apartment with  at [facility]  Associated Symptoms: Denies cough, nausea, emesis, or abdominal pain      Additional History (Context): Yogi Blue is a 80 y.o. female with hx of Dementia and HTN who presents to ED via EMT from [place] as pt was found unresponsive. EMT state pt was awake upon arrival, was not speaking, but was responsive to cold and painful stimuli. Daughter in law states pt has hx of UTI's and has no known drug allergies. Daughter-in-law denies pt hx of heart disease or stroke. Pt denies cough, nausea, emesis, or abdominal pain. Daughter-in-law reports pt has a Rx for Eliquis but is unsure if pt has been taking recently. HPI limited due to pt dementia. PCP: Nancy Mcduffie MD      Past History     Past Medical History:  Past Medical History:   Diagnosis Date    Anticoagulated on Coumadin     Anticoagulation goal of INR 2 to 3     Decreased calculated glomerular filtration rate (GFR) 8/1/2016    Calculated GFR equivalent to that of CKD stage 3 = 30-59 ml/min    Dementia     DVT (deep venous thrombosis) (HCC)     Dyslipidemia     Essential hypertension     Glaucoma     Osteoporosis     Postoperative anemia due to acute blood loss 7/29/2016       Past Surgical History:  History reviewed. No pertinent surgical history.     Family History:  Family History   Problem Relation Age of Onset    Heart Disease Mother     Diabetes Mother    [address] [facility] Tadeo Arthritis-osteo Father     Cancer Brother        Social History:  Social History   Substance Use Topics    Smoking status: Never Smoker    Smokeless tobacco: Never Used    Alcohol use No       Allergies:  No Known Allergies      Review of Systems       Review of Systems   Unable to perform ROS: Dementia   Eyes: Positive for itching. Physical Exam     Visit Vitals    /70    Pulse (!) 59    Temp 97.5 °F (36.4 °C)    Resp 15    Ht 5' 3\" (1.6 m)    Wt 68 kg (150 lb)    SpO2 96%    BMI 26.57 kg/m2         Physical Exam   Constitutional: She is oriented to person, place, and time. She appears well-developed and well-nourished. No distress. Pt awake and alert and in no distress   HENT:   Head: Normocephalic and atraumatic. Nose: Nose normal.   Mouth/Throat: Oropharynx is clear and moist.   Eyes: Conjunctivae and EOM are normal. Pupils are equal, round, and reactive to light. No scleral icterus. Neck: Normal range of motion. Neck supple. Cardiovascular: Intact distal pulses. Exam reveals no gallop and no friction rub. Capillary refill < 3 seconds   Pulmonary/Chest: Effort normal and breath sounds normal. No respiratory distress. She has no wheezes. Lungs clear  O2 at 99% on room air   Abdominal: Soft. Bowel sounds are normal. She exhibits no distension. There is no tenderness. Musculoskeletal: Normal range of motion. She exhibits no edema (LE). Lymphadenopathy:     She has no cervical adenopathy. Neurological: She is alert and oriented to person, place, and time. No cranial nerve deficit. She exhibits normal muscle tone. Coordination normal.   No slurred speech  No facial droop   Skin: Skin is warm and dry. No rash noted. She is not diaphoretic. Psychiatric:   Slightly pleasantly confused. She says some joking, funny remarks. Nursing note and vitals reviewed.         Diagnostic Study Results     Labs -  Recent Results (from the past 12 hour(s))   EKG, 12 LEAD, INITIAL    Collection Time: 04/10/18  1:27 PM   Result Value Ref Range    Ventricular Rate 57 BPM    Atrial Rate 57 BPM    P-R Interval 206 ms QRS Duration 80 ms    Q-T Interval 448 ms    QTC Calculation (Bezet) 436 ms    Calculated P Axis 36 degrees    Calculated R Axis 8 degrees    Calculated T Axis 12 degrees    Diagnosis       Sinus bradycardia  Otherwise normal ECG  When compared with ECG of 04-FEB-2017 09:06,  No significant change was found  Confirmed by Norma Barakat MD, Chilango Lees ([id number]) on 4/10/2018 2:51:10 PM     CBC WITH AUTOMATED DIFF    Collection Time: 04/10/18  2:00 PM   Result Value Ref Range    WBC 3.6 (L) 4.6 - 13.2 K/uL    RBC 4.50 4. 20 - 5.30 M/uL    HGB 12.9 12.0 - 16.0 g/dL    HCT 40.7 35.0 - 45.0 %    MCV 90.4 74.0 - 97.0 FL    MCH 28.7 24.0 - 34.0 PG    MCHC 31.7 31.0 - 37.0 g/dL    RDW 14.3 11.6 - 14.5 %    PLATELET 409 064 - 125 K/uL    MPV 9.9 9.2 - 11.8 FL    NEUTROPHILS 54 40 - 73 %    LYMPHOCYTES 26 21 - 52 %    MONOCYTES 16 (H) 3 - 10 %    EOSINOPHILS 3 0 - 5 %    BASOPHILS 1 0 - 2 %    ABS. NEUTROPHILS 1.9 1.8 - 8.0 K/UL    ABS. LYMPHOCYTES 1.0 0.9 - 3.6 K/UL    ABS. MONOCYTES 0.6 0.05 - 1.2 K/UL    ABS. EOSINOPHILS 0.1 0.0 - 0.4 K/UL    ABS.  BASOPHILS 0.0 0.0 - 0.06 K/UL    DF AUTOMATED     METABOLIC PANEL, BASIC    Collection Time: 04/10/18  2:00 PM   Result Value Ref Range    Sodium 140 136 - 145 mmol/L    Potassium 4.2 3.5 - 5.5 mmol/L    Chloride 106 100 - 108 mmol/L    CO2 30 21 - 32 mmol/L    Anion gap 4 3.0 - 18 mmol/L    Glucose 89 74 - 99 mg/dL    BUN 11 7.0 - 18 MG/DL    Creatinine 0.84 0.6 - 1.3 MG/DL    BUN/Creatinine ratio 13 12 - 20      GFR est AA >60 >60 ml/min/1.73m2    GFR est non-AA >60 >60 ml/min/1.73m2    Calcium 9.4 8.5 - 10.1 MG/DL   URINALYSIS W/ RFLX MICROSCOPIC    Collection Time: 04/10/18  2:47 PM   Result Value Ref Range    Color YELLOW      Appearance CLEAR      Specific gravity 1.009 1.005 - 1.030      pH (UA) 7.0 5.0 - 8.0      Protein NEGATIVE  NEG mg/dL    Glucose NEGATIVE  NEG mg/dL    Ketone NEGATIVE  NEG mg/dL    Bilirubin NEGATIVE  NEG      Blood NEGATIVE  NEG      Urobilinogen 1.0 0.2 - 1.0 EU/dL Nitrites NEGATIVE  NEG      Leukocyte Esterase TRACE (A) NEG     URINE MICROSCOPIC ONLY    Collection Time: 04/10/18  2:47 PM   Result Value Ref Range    WBC 0 to 3 0 - 4 /hpf    RBC 0 to 3 0 - 5 /hpf    Epithelial cells 2+ 0 - 5 /lpf       Radiologic Studies -   XR CHEST PORT   Final Result            Medical Decision Making   I am the first provider for this patient. I reviewed the vital signs, available nursing notes, past medical history, past surgical history, family history and social history. Vital Signs-Reviewed the patient's vital signs. Pulse Oximetry Analysis -  99% on room air (Interpretation) Normal    Cardiac Monitor:  Rate: 60 bpm  Rhythm:  Normal Sinus Rhythm     EKG: Interpreted by the EP. Time Interpreted: 1:29 PM   Rate: 57 bpm   Rhythm: Sinus Bradycardia   Interpretation: Normal QRS, Normal QTc, Normal Axis. No ST elevation. No ST depression    Records Reviewed: Nursing Notes (Time of Review: 1:29 PM)    Provider Notes (Medical Decision Making): ddx dementia, infectious, dehydration    On arrival, pt Alert, talking, no distress. Neuro grossly intact    Will check labs and UA, cxr  Give IVF bolus 500cc    MDM      CxR per Dr Scar Tinajero prelim read: no acute process    No alarming labs although WBC 3.6. She has no sign or source of any infection. Is afebrile. I have reassessed the patient. I have discussed the workup, results and plan with the patient and patient is in agreement. Patient is feeling better, no complaint. Patient was discharge in stable condition. Patient was given outpatient follow up. Patient is to return to emergency department if any new or worsening condition. Medications   sodium chloride 0.9 % bolus infusion 500 mL (0 mL IntraVENous IV Completed 4/10/18 2130)         Diagnosis     Clinical Impression:   1. Well adult health check    2.  Dementia without behavioral disturbance, unspecified dementia type        Disposition: Discharge    Follow-up Information Follow up With Details Comments Yoselin Cueva MD Call in 2 days For follow up [address]  [address] [facility] EMERGENCY DEPT Go to As needed, If symptoms worsen [address] [facility]  [phone]           Patient's Medications   Start Taking    No medications on file   Continue Taking    ACETAMINOPHEN (TYLENOL) 325 MG TABLET    Take  by mouth every four (4) hours as needed for Pain. BIMATOPROST (LUMIGAN) 0.01 % OPHTHALMIC DROPS    Administer 1 Drop to both eyes every evening. BRIMONIDINE-TIMOLOL (COMBIGAN) 0.2-0.5 % DROP OPHTHALMIC SOLUTION    1 Drop every twelve (12) hours. FERROUS SULFATE 300 MG (60 MG IRON)/5 ML SYRUP    Take 5 mL by mouth daily (with breakfast). HYDROCODONE-ACETAMINOPHEN (NORCO) 5-325 MG PER TABLET    Take 1 Tab by mouth every six (6) hours as needed for Pain. Max Daily Amount: 4 Tabs. May break the tablets in half. LIDOCAINE (XYLOCAINE) 4 % TOPICAL CREAM    Apply  to affected area four (4) times daily as needed for Pain. LISINOPRIL (PRINIVIL, ZESTRIL) 10 MG TABLET    Take 10 mg by mouth daily. SIMVASTATIN (ZOCOR) 20 MG TABLET    Take 20 mg by mouth nightly. Indications: DYSLIPIDEMIA    WARFARIN (COUMADIN) 3 MG TABLET    Take 3 mg by mouth every other day. These Medications have changed    No medications on file   Stop Taking    No medications on file     _______________________________    Attestations:  [id number] Zapien acting as a scribe for and in the presence of Elisha Lanidn DO      April 10, 2018 at 1:45 PM       Provider Attestation:      I personally performed the services described in the documentation, reviewed the documentation, as recorded by the scribe in my presence, and it accurately and completely records my words and actions.  April 10, 2018 at 1:45 PM - Elisha Landin DO    _______________________________

## 2018-04-10 NOTE — ED NOTES
Assumed care of pt in bed 4 from [facility]. Pt is awake, alert and oriented to baseline per daughter in law. Pt denies any discomfort or concern. Awaiting results/ disposition.

## 2018-04-10 NOTE — ED TRIAGE NOTES
Per staff at [facility] assisted living, patient was unresponsive at home. Her and  both with dementia and poor historians. Patient awake now and only complains of being thirsty.

## 2018-04-10 NOTE — ED NOTES
Both written and verbal discharge instructions given to pt and pt's daughter in law with verbalized understanding of home care and follow up. Pt to be transported back to [facility] with her daughter in law.

## 2018-07-12 ENCOUNTER — IMPORTED ENCOUNTER (OUTPATIENT)
Dept: URBAN - METROPOLITAN AREA CLINIC 1 | Facility: CLINIC | Age: 83
End: 2018-07-12

## 2018-07-12 PROBLEM — Z96.1: Noted: 2018-07-12

## 2018-07-12 PROBLEM — H01.002: Noted: 2018-07-12

## 2018-07-12 PROBLEM — H16.143: Noted: 2018-07-12

## 2018-07-12 PROBLEM — H26.491: Noted: 2018-07-12

## 2018-07-12 PROBLEM — H01.005: Noted: 2018-07-12

## 2018-07-12 PROBLEM — H40.1133: Noted: 2018-07-12

## 2018-07-12 PROBLEM — H04.123: Noted: 2018-07-12

## 2018-07-12 PROCEDURE — 92014 COMPRE OPH EXAM EST PT 1/>: CPT

## 2018-07-12 PROCEDURE — 92133 CPTRZD OPH DX IMG PST SGM ON: CPT

## 2018-07-12 NOTE — PATIENT DISCUSSION
1.  Severe Open Angle Glaucoma OU (0.8/0.9)- Slight increased IOP OU w/ non compliance. Urged compliance w/ drops. No progression by OCT OU. Ok to decrease Combigan OUto  BID (for compliance reasons.) Patient to continue with Lumigan OU QHS. Patient advised to be compliant with gtts. Condition was discussed with patient and patient understands. Will continue to monitor patient for any progression in condition. Patient was advised to call us with any problems questions or concerns. 2.  Blepharitis posterior type OU- Controlled. Continue daily warm compresses and lid scrubs were recommended. 3. JANN w/ PEK OU- Controlled. The continuation of artificial tears were recommended. 4.  PCO  OD- Observe and consider yag cap when pt feels pco visually significant and visual acuity decreases to appropriate level. 5. 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 6. Return for an appointment for 10 in 6 months with Dr. Mari Bui.

## 2018-11-26 ENCOUNTER — HOSPITAL ENCOUNTER (EMERGENCY)
Age: 83
Discharge: HOME | End: 2018-11-26
Attending: EMERGENCY MEDICINE
Payer: MEDICARE

## 2018-11-26 ENCOUNTER — APPOINTMENT (OUTPATIENT)
Dept: GENERAL RADIOLOGY | Age: 83
End: 2018-11-26
Attending: EMERGENCY MEDICINE
Payer: MEDICARE

## 2018-11-26 VITALS
RESPIRATION RATE: 16 BRPM | SYSTOLIC BLOOD PRESSURE: 161 MMHG | TEMPERATURE: 98 F | DIASTOLIC BLOOD PRESSURE: 70 MMHG | HEART RATE: 71 BPM | OXYGEN SATURATION: 97 %

## 2018-11-26 DIAGNOSIS — F03.90 DEMENTIA WITHOUT BEHAVIORAL DISTURBANCE, UNSPECIFIED DEMENTIA TYPE: ICD-10-CM

## 2018-11-26 DIAGNOSIS — Z00.00 WELL ADULT HEALTH CHECK: Primary | ICD-10-CM

## 2018-11-26 LAB
ALBUMIN SERPL-MCNC: 3 G/DL (ref 3.4–5)
ALBUMIN/GLOB SERPL: 0.9 {RATIO} (ref 0.8–1.7)
ALP SERPL-CCNC: 82 U/L (ref 45–117)
ALT SERPL-CCNC: 15 U/L (ref 13–56)
ANION GAP SERPL CALC-SCNC: 8 MMOL/L (ref 3–18)
APPEARANCE UR: CLEAR
AST SERPL-CCNC: 18 U/L (ref 15–37)
BACTERIA URNS QL MICRO: ABNORMAL /HPF
BASOPHILS # BLD: 0 K/UL (ref 0–0.1)
BASOPHILS NFR BLD: 1 % (ref 0–2)
BILIRUB SERPL-MCNC: 0.3 MG/DL (ref 0.2–1)
BILIRUB UR QL: NEGATIVE
BUN SERPL-MCNC: 12 MG/DL (ref 7–18)
BUN/CREAT SERPL: 13 (ref 12–20)
CALCIUM SERPL-MCNC: 8.6 MG/DL (ref 8.5–10.1)
CHLORIDE SERPL-SCNC: 101 MMOL/L (ref 100–108)
CK MB CFR SERPL CALC: NORMAL % (ref 0–4)
CK MB SERPL-MCNC: <1 NG/ML (ref 5–25)
CK SERPL-CCNC: 50 U/L (ref 26–192)
CO2 SERPL-SCNC: 28 MMOL/L (ref 21–32)
COLOR UR: YELLOW
CREAT SERPL-MCNC: 0.95 MG/DL (ref 0.6–1.3)
DIFFERENTIAL METHOD BLD: ABNORMAL
EOSINOPHIL # BLD: 0.1 K/UL (ref 0–0.4)
EOSINOPHIL NFR BLD: 3 % (ref 0–5)
EPITH CASTS URNS QL MICRO: ABNORMAL /LPF (ref 0–5)
ERYTHROCYTE [DISTWIDTH] IN BLOOD BY AUTOMATED COUNT: 14.2 % (ref 11.6–14.5)
GLOBULIN SER CALC-MCNC: 3.5 G/DL (ref 2–4)
GLUCOSE SERPL-MCNC: 102 MG/DL (ref 74–99)
GLUCOSE UR STRIP.AUTO-MCNC: NEGATIVE MG/DL
HCT VFR BLD AUTO: 39.3 % (ref 35–45)
HGB BLD-MCNC: 12.7 G/DL (ref 12–16)
HGB UR QL STRIP: NEGATIVE
KETONES UR QL STRIP.AUTO: NEGATIVE MG/DL
LEUKOCYTE ESTERASE UR QL STRIP.AUTO: ABNORMAL
LYMPHOCYTES # BLD: 0.7 K/UL (ref 0.9–3.6)
LYMPHOCYTES NFR BLD: 18 % (ref 21–52)
MCH RBC QN AUTO: 29.7 PG (ref 24–34)
MCHC RBC AUTO-ENTMCNC: 32.3 G/DL (ref 31–37)
MCV RBC AUTO: 91.8 FL (ref 74–97)
MONOCYTES # BLD: 0.7 K/UL (ref 0.05–1.2)
MONOCYTES NFR BLD: 18 % (ref 3–10)
NEUTS SEG # BLD: 2.3 K/UL (ref 1.8–8)
NEUTS SEG NFR BLD: 60 % (ref 40–73)
NITRITE UR QL STRIP.AUTO: NEGATIVE
PH UR STRIP: 5.5 [PH] (ref 5–8)
PLATELET # BLD AUTO: 200 K/UL (ref 135–420)
PMV BLD AUTO: 10.2 FL (ref 9.2–11.8)
POTASSIUM SERPL-SCNC: 3.7 MMOL/L (ref 3.5–5.5)
PROT SERPL-MCNC: 6.5 G/DL (ref 6.4–8.2)
PROT UR STRIP-MCNC: NEGATIVE MG/DL
RBC # BLD AUTO: 4.28 M/UL (ref 4.2–5.3)
RBC #/AREA URNS HPF: NEGATIVE /HPF (ref 0–5)
SODIUM SERPL-SCNC: 137 MMOL/L (ref 136–145)
SP GR UR REFRACTOMETRY: 1.01 (ref 1–1.03)
TROPONIN I SERPL-MCNC: 0.02 NG/ML (ref 0–0.06)
UROBILINOGEN UR QL STRIP.AUTO: 1 EU/DL (ref 0.2–1)
WBC # BLD AUTO: 3.8 K/UL (ref 4.6–13.2)
WBC URNS QL MICRO: ABNORMAL /HPF (ref 0–4)

## 2018-11-26 PROCEDURE — 85025 COMPLETE CBC W/AUTO DIFF WBC: CPT

## 2018-11-26 PROCEDURE — 81001 URINALYSIS AUTO W/SCOPE: CPT

## 2018-11-26 PROCEDURE — 82553 CREATINE MB FRACTION: CPT

## 2018-11-26 PROCEDURE — 71046 X-RAY EXAM CHEST 2 VIEWS: CPT

## 2018-11-26 PROCEDURE — 80053 COMPREHEN METABOLIC PANEL: CPT

## 2018-11-26 PROCEDURE — 93005 ELECTROCARDIOGRAM TRACING: CPT

## 2018-11-26 PROCEDURE — 99284 EMERGENCY DEPT VISIT MOD MDM: CPT

## 2018-11-26 NOTE — DISCHARGE INSTRUCTIONS
Well Visit, Ages 25 to 48: Care Instructions  Your Care Instructions    Physical exams can help you stay healthy. Your doctor has checked your overall health and may have suggested ways to take good care of yourself. He or she also may have recommended tests. At home, you can help prevent illness with healthy eating, regular exercise, and other steps. Follow-up care is a key part of your treatment and safety. Be sure to make and go to all appointments, and call your doctor if you are having problems. It's also a good idea to know your test results and keep a list of the medicines you take. How can you care for yourself at home? · Reach and stay at a healthy weight. This will lower your risk for many problems, such as obesity, diabetes, heart disease, and high blood pressure. · Get at least 30 minutes of physical activity on most days of the week. Walking is a good choice. You also may want to do other activities, such as running, swimming, cycling, or playing tennis or team sports. Discuss any changes in your exercise program with your doctor. · Do not smoke or allow others to smoke around you. If you need help quitting, talk to your doctor about stop-smoking programs and medicines. These can increase your chances of quitting for good. · Talk to your doctor about whether you have any risk factors for sexually transmitted infections (STIs). Having one sex partner (who does not have STIs and does not have sex with anyone else) is a good way to avoid these infections. · Use birth control if you do not want to have children at this time. Talk with your doctor about the choices available and what might be best for you. · Protect your skin from too much sun. When you're outdoors from 10 a.m. to 4 p.m., stay in the shade or cover up with clothing and a hat with a wide brim. Wear sunglasses that block UV rays. Even when it's cloudy, put broad-spectrum sunscreen (SPF 30 or higher) on any exposed skin.   · See a dentist one or two times a year for checkups and to have your teeth cleaned. · Wear a seat belt in the car. · Drink alcohol in moderation, if at all. That means no more than 2 drinks a day for men and 1 drink a day for women. Follow your doctor's advice about when to have certain tests. These tests can spot problems early. For everyone  · Cholesterol. Have the fat (cholesterol) in your blood tested after age 21. Your doctor will tell you how often to have this done based on your age, family history, or other things that can increase your risk for heart disease. · Blood pressure. Have your blood pressure checked during a routine doctor visit. Your doctor will tell you how often to check your blood pressure based on your age, your blood pressure results, and other factors. · Vision. Talk with your doctor about how often to have a glaucoma test.  · Diabetes. Ask your doctor whether you should have tests for diabetes. · Colon cancer. Have a test for colon cancer at age 48. You may have one of several tests. If you are younger than 48, you may need a test earlier if you have any risk factors. Risk factors include whether you already had a precancerous polyp removed from your colon or whether your parent, brother, sister, or child has had colon cancer. For women  · Breast exam and mammogram. Talk to your doctor about when you should have a clinical breast exam and a mammogram. Medical experts differ on whether and how often women under 50 should have these tests. Your doctor can help you decide what is right for you. · Pap test and pelvic exam. Begin Pap tests at age 24. A Pap test is the best way to find cervical cancer. The test often is part of a pelvic exam. Ask how often to have this test.  · Tests for sexually transmitted infections (STIs). Ask whether you should have tests for STIs. You may be at risk if you have sex with more than one person, especially if your partners do not wear condoms.   For men  · Tests for sexually transmitted infections (STIs). Ask whether you should have tests for STIs. You may be at risk if you have sex with more than one person, especially if you do not wear a condom. · Testicular cancer exam. Ask your doctor whether you should check your testicles regularly. · Prostate exam. Talk to your doctor about whether you should have a blood test (called a PSA test) for prostate cancer. Experts differ on whether and when men should have this test. Some experts suggest it if you are older than 39 and are -American or have a father or brother who got prostate cancer when he was younger than 72. When should you call for help? Watch closely for changes in your health, and be sure to contact your doctor if you have any problems or symptoms that concern you. Where can you learn more? Go to http://colleen-dusty.info/. Enter P072 in the search box to learn more about \"Well Visit, Ages 25 to 48: Care Instructions. \"  Current as of: March 29, 2018  Content Version: 11.8  © 2173-8051 [employer]. Care instructions adapted under license by [employer] (which disclaims liability or warranty for this information). If you have questions about a medical condition or this instruction, always ask your healthcare professional. [address] any warranty or liability for your use of this information. Learning About the Symptoms of Dementia  What is dementia? We all forget things as we get older. Many older people have a slight loss of memory that does not affect their daily lives. But memory loss that gets worse may mean that you have dementia. Dementia is a loss of mental skills that affects your daily life. It can cause problems with memory, problem-solving, and learning. It also can cause problems with thinking and planning. Dementia usually gets worse over time.  But how quickly it gets worse is different for each person. Some people stay the same for years. Others lose skills quickly. Your chances of having dementia rise as you get older. But this doesn't mean that everyone will get it. What causes dementia? Dementia is caused by damage to or changes in the brain. Alzheimer's disease is the most common kind of dementia. Alzheimer's causes a steady loss of memory and how well you can speak, think, and do your daily activities. The second most common kind of dementia is caused by a series of strokes. It's called vascular dementia. It often gets worse step by step. With each new stroke, more mental skills are lost.  Serious head injuries in the past can sometimes lead to dementia, too. What are the symptoms? Usually the first symptom of dementia is memory loss. Often the person who has the memory problem doesn't notice it, but family and friends do. People who have dementia may have increasing trouble with:  · Recalling recent events. They may forget appointments or lose objects. · Recognizing people and places. · Keeping up with conversations and activity. · Finding their way around familiar places, or driving to and from places they know well. · Keeping up personal care such as grooming or bathing. · Planning and carrying out routine tasks. They may have trouble following a recipe or writing a letter or email. What are some next steps? If you are worried about memory loss or changes in your thinking, see your doctor soon. He or she can do a physical exam and ask questions about recent and past illnesses and life events. Think about bringing someone to your doctor's appointment to take notes for you. Your doctor may suggest a series of tests to measure memory changes over time. These tests give the doctor an overall picture of how well your brain is working. The doctor can use the results to decide the best treatment program and help make your life safer and easier.   It is important to know that memory loss and changes in thinking can be caused by things other than dementia. These things can include health problems such as depression, a low amount of thyroid hormone, and some infections. When those things are treated, your symptoms can get better. Follow-up care is a key part of your treatment and safety. Be sure to make and go to all appointments, and call your doctor if you are having problems. It's also a good idea to know your test results and keep a list of the medicines you take. Where can you learn more? Go to http://colleen-dusty.info/. Enter [id number] in the search box to learn more about \"Learning About the Symptoms of Dementia. \"  Current as of: December 7, 2017  Content Version: 11.8  © 9608-6094 [employer]. Care instructions adapted under license by [employer] (which disclaims liability or warranty for this information). If you have questions about a medical condition or this instruction, always ask your healthcare professional. [place] [address] any warranty or liability for your use of this information.

## 2018-11-26 NOTE — ED PROVIDER NOTES
HPI Patient is a 79 yo female who was sleeping. Her  call 911 and stated that his wife was unresponsive and would not wake up. Upon arrival, paramedics states the patient was alert and oriented. Past Medical History:  
Diagnosis Date  Anticoagulated on Coumadin  Anticoagulation goal of INR 2 to 3   
 Decreased calculated glomerular filtration rate (GFR) 8/1/2016 Calculated GFR equivalent to that of CKD stage 3 = 30-59 ml/min  Dementia  DVT (deep venous thrombosis) ([facility] [address]  Dyslipidemia  Essential hypertension  Glaucoma  Osteoporosis  Postoperative anemia due to acute blood loss 7/29/2016 History reviewed. No pertinent surgical history. Family History:  
Problem Relation Age of Onset  Heart Disease Mother  Diabetes Mother  Arthritis-osteo Father  Cancer Brother Social History Socioeconomic History  Marital status:  Spouse name: Not on file  Number of children: Not on file  Years of education: Not on file  Highest education level: Not on file Social Needs  Financial resource strain: Not on file  Food insecurity - worry: Not on file  Food insecurity - inability: Not on file  Transportation needs - medical: Not on file  Transportation needs - non-medical: Not on file Occupational History  Not on file Tobacco Use  Smoking status: Never Smoker  Smokeless tobacco: Never Used Substance and Sexual Activity  Alcohol use: No  
 Drug use: No  
 Sexual activity: Not on file Other Topics Concern  Not on file Social History Narrative  Not on file ALLERGIES: Patient has no known allergies. Review of Systems Constitutional: Negative. HENT: Negative. Eyes: Negative. Respiratory: Negative. Cardiovascular: Negative. Gastrointestinal: Negative. Endocrine: Negative. Genitourinary: Negative. Musculoskeletal: Negative. Skin: Negative. Allergic/Immunologic: Negative. Neurological: Negative. Hematological: Negative. Psychiatric/Behavioral: Negative. All other systems reviewed and are negative. Vitals:  
 11/26/18 0407 BP: 161/70 Pulse: 71 Resp: 16 Temp: 98 °F (36.7 °C) SpO2: 97% Physical Exam  
Constitutional: She is oriented to person, place, and time. She appears well-developed and well-nourished. No distress. HENT:  
Head: Normocephalic. Right Ear: External ear normal.  
Left Ear: External ear normal.  
Mouth/Throat: No oropharyngeal exudate. Eyes: Conjunctivae and EOM are normal. Pupils are equal, round, and reactive to light. Right eye exhibits no discharge. Left eye exhibits no discharge. No scleral icterus. Neck: Normal range of motion. Neck supple. No JVD present. No tracheal deviation present. No thyromegaly present. Cardiovascular: Normal rate, regular rhythm, normal heart sounds and intact distal pulses. Exam reveals no gallop and no friction rub. No murmur heard. Pulmonary/Chest: Effort normal and breath sounds normal. No stridor. No respiratory distress. She has no wheezes. She has no rales. She exhibits no tenderness. Abdominal: Soft. Bowel sounds are normal. She exhibits no distension and no mass. There is no tenderness. There is no rebound and no guarding. Musculoskeletal: Normal range of motion. She exhibits no edema or tenderness. Lymphadenopathy:  
  She has no cervical adenopathy. Neurological: She is alert and oriented to person, place, and time. She displays normal reflexes. No cranial nerve deficit. She exhibits normal muscle tone. Coordination normal.  
Skin: Skin is warm and dry. Rash noted. She is not diaphoretic. No erythema. No pallor. Faint blanching rash on her neck (daughter in law states the rash has been present for a week and is resolving). Psychiatric: She has a normal mood and affect.  Her behavior is normal. Thought content normal.  
 Nursing note and vitals reviewed. MDM : dementia, UTI, dehydration, TIA, MI, dehydration Procedures Dx: normal exam, minimal dementia Disposition:  D/C home

## 2018-11-26 NOTE — ED TRIAGE NOTES
Per EMS, the  of the patient could not wake the patient up, however, when EMS arrived, they were able to stimulate the patient and wake her up. Pt. Is A/Ox4 at this time and No trauma or injuries are noted. Pt states her  is trying to get rid of her and he has a girlfriend in the nursing facility they live in.

## 2018-11-28 LAB
ATRIAL RATE: 67 BPM
CALCULATED P AXIS, ECG09: 9 DEGREES
CALCULATED R AXIS, ECG10: 3 DEGREES
DIAGNOSIS, 93000: NORMAL
P-R INTERVAL, ECG05: 206 MS
Q-T INTERVAL, ECG07: 408 MS
QRS DURATION, ECG06: 80 MS
QTC CALCULATION (BEZET), ECG08: 431 MS
VENTRICULAR RATE, ECG03: 67 BPM

## 2019-01-31 ENCOUNTER — HOSPITAL ENCOUNTER (EMERGENCY)
Age: 84
Discharge: HOME | End: 2019-01-31
Attending: EMERGENCY MEDICINE
Payer: MEDICARE

## 2019-01-31 ENCOUNTER — APPOINTMENT (OUTPATIENT)
Dept: CT IMAGING | Age: 84
End: 2019-01-31
Attending: EMERGENCY MEDICINE
Payer: MEDICARE

## 2019-01-31 ENCOUNTER — APPOINTMENT (OUTPATIENT)
Dept: GENERAL RADIOLOGY | Age: 84
End: 2019-01-31
Attending: EMERGENCY MEDICINE
Payer: MEDICARE

## 2019-01-31 VITALS
BODY MASS INDEX: 26.57 KG/M2 | RESPIRATION RATE: 15 BRPM | SYSTOLIC BLOOD PRESSURE: 141 MMHG | TEMPERATURE: 97.8 F | OXYGEN SATURATION: 95 % | WEIGHT: 150 LBS | HEART RATE: 64 BPM | DIASTOLIC BLOOD PRESSURE: 65 MMHG

## 2019-01-31 DIAGNOSIS — S00.83XA TRAUMATIC HEMATOMA OF FOREHEAD, INITIAL ENCOUNTER: ICD-10-CM

## 2019-01-31 DIAGNOSIS — M25.551 BILATERAL HIP PAIN: ICD-10-CM

## 2019-01-31 DIAGNOSIS — S09.90XA CLOSED HEAD INJURY, INITIAL ENCOUNTER: Primary | ICD-10-CM

## 2019-01-31 DIAGNOSIS — M25.552 BILATERAL HIP PAIN: ICD-10-CM

## 2019-01-31 DIAGNOSIS — S42.032A DISPLACED FRACTURE OF LATERAL END OF LEFT CLAVICLE, INITIAL ENCOUNTER FOR CLOSED FRACTURE: ICD-10-CM

## 2019-01-31 LAB
ANION GAP SERPL CALC-SCNC: 7 MMOL/L (ref 3–18)
APPEARANCE UR: CLEAR
ATRIAL RATE: 60 BPM
BACTERIA URNS QL MICRO: ABNORMAL /HPF
BASOPHILS # BLD: 0 K/UL (ref 0–0.1)
BASOPHILS NFR BLD: 0 % (ref 0–2)
BILIRUB UR QL: NEGATIVE
BUN SERPL-MCNC: 14 MG/DL (ref 7–18)
BUN/CREAT SERPL: 12 (ref 12–20)
CALCIUM SERPL-MCNC: 9.2 MG/DL (ref 8.5–10.1)
CALCULATED P AXIS, ECG09: 38 DEGREES
CALCULATED R AXIS, ECG10: 0 DEGREES
CALCULATED T AXIS, ECG11: 8 DEGREES
CHLORIDE SERPL-SCNC: 105 MMOL/L (ref 100–108)
CK MB CFR SERPL CALC: NORMAL % (ref 0–4)
CK MB SERPL-MCNC: <1 NG/ML (ref 5–25)
CK SERPL-CCNC: 34 U/L (ref 26–192)
CO2 SERPL-SCNC: 27 MMOL/L (ref 21–32)
COLOR UR: YELLOW
CREAT SERPL-MCNC: 1.21 MG/DL (ref 0.6–1.3)
DIAGNOSIS, 93000: NORMAL
DIFFERENTIAL METHOD BLD: ABNORMAL
EOSINOPHIL # BLD: 0.2 K/UL (ref 0–0.4)
EOSINOPHIL NFR BLD: 3 % (ref 0–5)
EPITH CASTS URNS QL MICRO: ABNORMAL /LPF (ref 0–5)
ERYTHROCYTE [DISTWIDTH] IN BLOOD BY AUTOMATED COUNT: 14.9 % (ref 11.6–14.5)
GLUCOSE SERPL-MCNC: 197 MG/DL (ref 74–99)
GLUCOSE UR STRIP.AUTO-MCNC: NEGATIVE MG/DL
HCT VFR BLD AUTO: 41.3 % (ref 35–45)
HGB BLD-MCNC: 12.9 G/DL (ref 12–16)
HGB UR QL STRIP: ABNORMAL
KETONES UR QL STRIP.AUTO: NEGATIVE MG/DL
LEUKOCYTE ESTERASE UR QL STRIP.AUTO: ABNORMAL
LYMPHOCYTES # BLD: 0.6 K/UL (ref 0.9–3.6)
LYMPHOCYTES NFR BLD: 7 % (ref 21–52)
MCH RBC QN AUTO: 28.7 PG (ref 24–34)
MCHC RBC AUTO-ENTMCNC: 31.2 G/DL (ref 31–37)
MCV RBC AUTO: 91.8 FL (ref 74–97)
MONOCYTES # BLD: 0.7 K/UL (ref 0.05–1.2)
MONOCYTES NFR BLD: 8 % (ref 3–10)
MUCOUS THREADS URNS QL MICRO: ABNORMAL /LPF
NEUTS SEG # BLD: 6.6 K/UL (ref 1.8–8)
NEUTS SEG NFR BLD: 82 % (ref 40–73)
NITRITE UR QL STRIP.AUTO: NEGATIVE
P-R INTERVAL, ECG05: 194 MS
PH UR STRIP: 5.5 [PH] (ref 5–8)
PLATELET # BLD AUTO: 223 K/UL (ref 135–420)
PMV BLD AUTO: 10.3 FL (ref 9.2–11.8)
POTASSIUM SERPL-SCNC: 3.9 MMOL/L (ref 3.5–5.5)
PROT UR STRIP-MCNC: NEGATIVE MG/DL
Q-T INTERVAL, ECG07: 466 MS
QRS DURATION, ECG06: 82 MS
QTC CALCULATION (BEZET), ECG08: 466 MS
RBC # BLD AUTO: 4.5 M/UL (ref 4.2–5.3)
RBC #/AREA URNS HPF: ABNORMAL /HPF (ref 0–5)
SODIUM SERPL-SCNC: 139 MMOL/L (ref 136–145)
SP GR UR REFRACTOMETRY: 1.02 (ref 1–1.03)
TROPONIN I SERPL-MCNC: 0.02 NG/ML (ref 0–0.06)
UROBILINOGEN UR QL STRIP.AUTO: 1 EU/DL (ref 0.2–1)
VENTRICULAR RATE, ECG03: 60 BPM
WBC # BLD AUTO: 8 K/UL (ref 4.6–13.2)
WBC URNS QL MICRO: ABNORMAL /HPF (ref 0–4)

## 2019-01-31 PROCEDURE — 85025 COMPLETE CBC W/AUTO DIFF WBC: CPT

## 2019-01-31 PROCEDURE — A4565 SLINGS: HCPCS

## 2019-01-31 PROCEDURE — 73030 X-RAY EXAM OF SHOULDER: CPT

## 2019-01-31 PROCEDURE — 81001 URINALYSIS AUTO W/SCOPE: CPT

## 2019-01-31 PROCEDURE — 93005 ELECTROCARDIOGRAM TRACING: CPT

## 2019-01-31 PROCEDURE — 99285 EMERGENCY DEPT VISIT HI MDM: CPT

## 2019-01-31 PROCEDURE — 74011250637 HC RX REV CODE- 250/637: Performed by: EMERGENCY MEDICINE

## 2019-01-31 PROCEDURE — 82550 ASSAY OF CK (CPK): CPT

## 2019-01-31 PROCEDURE — 80048 BASIC METABOLIC PNL TOTAL CA: CPT

## 2019-01-31 PROCEDURE — 72170 X-RAY EXAM OF PELVIS: CPT

## 2019-01-31 PROCEDURE — 72125 CT NECK SPINE W/O DYE: CPT

## 2019-01-31 PROCEDURE — 70450 CT HEAD/BRAIN W/O DYE: CPT

## 2019-01-31 RX ORDER — TRAMADOL HYDROCHLORIDE 50 MG/1
50 TABLET ORAL
Qty: 16 TAB | Refills: 0 | Status: ON HOLD | OUTPATIENT
Start: 2019-01-31 | End: 2020-03-02 | Stop reason: SDUPTHER

## 2019-01-31 RX ORDER — ACETAMINOPHEN 500 MG
1000 TABLET ORAL
Status: COMPLETED | OUTPATIENT
Start: 2019-01-31 | End: 2019-01-31

## 2019-01-31 RX ORDER — TRAMADOL HYDROCHLORIDE 50 MG/1
50 TABLET ORAL
Qty: 16 TAB | Refills: 0 | Status: SHIPPED | OUTPATIENT
Start: 2019-01-31 | End: 2019-01-31 | Stop reason: SDUPTHER

## 2019-01-31 RX ORDER — TRAMADOL HYDROCHLORIDE 50 MG/1
50 TABLET ORAL
Status: COMPLETED | OUTPATIENT
Start: 2019-01-31 | End: 2019-01-31

## 2019-01-31 RX ADMIN — TRAMADOL HYDROCHLORIDE 50 MG: 50 TABLET, FILM COATED ORAL at 19:31

## 2019-01-31 RX ADMIN — ACETAMINOPHEN 1000 MG: 500 TABLET ORAL at 16:54

## 2019-01-31 NOTE — ED PROVIDER NOTES
EMERGENCY DEPARTMENT HISTORY AND PHYSICAL EXAM 
 
4:58 PM 
 
 
Date: 1/31/2019 Patient Name: Cierra Deng History of Presenting Illness Chief Complaint Patient presents with  Fall  Shoulder Injury History Provided By: EMS and Patient Family Member Chief Complaint: Brenda Herr Duration: \"this afternoon\" Hours Timing:  Acute Quality: One episode Severity: Moderate Modifying Factors: worse after fall Associated Symptoms: shoulder pain, headache, neck pain Additional History (Context): Cierra Deng is a 80 y.o. female with PMHx of dementia, HTN, DVT, glaucoma who presents via EMS from [facility] with an unwitnessed fall that happened this afternoon. Pt c/o left shoulder pain, headache, neck pain. Family member at bedside states pt is on Xarelto. Denies cp or weakness. Denies any further complaints or symptoms at the moment. Pt Hx is limited due to pt Hx of dementia. PCP: Jasmeet Torres MD 
 
 
Past History Past Medical History: 
Past Medical History:  
Diagnosis Date  Anticoagulated on Coumadin  Anticoagulation goal of INR 2 to 3   
 Decreased calculated glomerular filtration rate (GFR) 8/1/2016 Calculated GFR equivalent to that of CKD stage 3 = 30-59 ml/min  Dementia  DVT (deep venous thrombosis) ([facility] [address]  Dyslipidemia  Essential hypertension  Glaucoma  Osteoporosis  Postoperative anemia due to acute blood loss 7/29/2016 Past Surgical History: 
History reviewed. No pertinent surgical history. Family History: 
Family History Problem Relation Age of Onset  Heart Disease Mother  Diabetes Mother  Arthritis-osteo Father  Cancer Brother Social History: 
Social History Tobacco Use  Smoking status: Never Smoker  Smokeless tobacco: Never Used Substance Use Topics  Alcohol use: No  
 Drug use: No  
 
 
Allergies: 
No Known Allergies Review of Systems Review of Systems Constitutional: Negative for chills and fever. HENT: Negative for congestion, rhinorrhea, sore throat and trouble swallowing. Eyes: Negative for visual disturbance. Respiratory: Negative for cough, shortness of breath and wheezing. Cardiovascular: Negative for chest pain. Gastrointestinal: Negative for abdominal pain, nausea and vomiting. Endocrine: Negative for polyuria. Genitourinary: Negative for dysuria. Musculoskeletal: Positive for neck pain. Negative for arthralgias and neck stiffness. Positive for shoulder pain Skin: Negative for pallor and rash. Neurological: Positive for headaches. Negative for dizziness, weakness and numbness. Hematological: Does not bruise/bleed easily. Psychiatric/Behavioral: Negative for confusion and dysphoric mood. All other systems reviewed and are negative. Physical Exam  
 
Visit Vitals /58 Pulse 66 Temp 97.8 °F (36.6 °C) Resp 21 Wt 68 kg (150 lb) SpO2 96% BMI 26.57 kg/m² Physical Exam  
Constitutional: She is oriented to person, place, and time. She appears well-developed and well-nourished. No distress. HENT:  
Head: Normocephalic and atraumatic. Mouth/Throat: Oropharynx is clear and moist.  
No step offs on face. No kahn signs. No raccoon eyes. Eyes: Conjunctivae are normal. Pupils are equal, round, and reactive to light. No scleral icterus. Neck: Normal range of motion. Neck supple. Cardiovascular: Normal rate and intact distal pulses. Capillary refill < 3 seconds Pulmonary/Chest: Effort normal and breath sounds normal. No respiratory distress. She has no wheezes. She exhibits no tenderness. Abdominal: Soft. Bowel sounds are normal. She exhibits no distension. There is no tenderness. Musculoskeletal: Normal range of motion. She exhibits no edema. Positive for tenderness bilateral hips. Pelvis is stable. Full ROM of bilateral upper and lower extremities. Positive for tender anterior left shoulder and clavicle. No obvious deformity. Positive for tender cervical spine. Lymphadenopathy:  
  She has no cervical adenopathy. Neurological: She is alert and oriented to person, place, and time. No cranial nerve deficit. She exhibits normal muscle tone. Coordination normal.  
Sensation is intact. Strength 5/5 x 4 limbs No slurred speech Skin: Skin is warm and dry. She is not diaphoretic. Pt has a hematoma on left side of forehead Psychiatric:  
Dementia. Initially pleasantly confused somewhat, but answers questions and following all instructions appropriately Nursing note and vitals reviewed. Diagnostic Study Results Labs - Recent Results (from the past 12 hour(s)) EKG, 12 LEAD, INITIAL Collection Time: 01/31/19  4:26 PM  
Result Value Ref Range Ventricular Rate 60 BPM  
 Atrial Rate 60 BPM  
 P-R Interval 194 ms QRS Duration 82 ms Q-T Interval 466 ms  
 QTC Calculation (Bezet) 466 ms Calculated P Axis 38 degrees Calculated R Axis 0 degrees Calculated T Axis 8 degrees Diagnosis Normal sinus rhythm T wave abnormality, consider anterior ischemia Abnormal ECG When compared with ECG of 26-NOV-2018 04:27, 
T wave abnormality, consider anterior ischemia is now pers Confirmed by Fernando Perez MD, [facility] ([id number]) on 1/31/2019 6:12:33 PM 
  
CBC WITH AUTOMATED DIFF Collection Time: 01/31/19  4:33 PM  
Result Value Ref Range WBC 8.0 4.6 - 13.2 K/uL  
 RBC 4.50 4. 20 - 5.30 M/uL  
 HGB 12.9 12.0 - 16.0 g/dL HCT 41.3 35.0 - 45.0 % MCV 91.8 74.0 - 97.0 FL  
 MCH 28.7 24.0 - 34.0 PG  
 MCHC 31.2 31.0 - 37.0 g/dL  
 RDW 14.9 (H) 11.6 - 14.5 % PLATELET 053 085 - 003 K/uL MPV 10.3 9.2 - 11.8 FL  
 NEUTROPHILS 82 (H) 40 - 73 % LYMPHOCYTES 7 (L) 21 - 52 % MONOCYTES 8 3 - 10 % EOSINOPHILS 3 0 - 5 % BASOPHILS 0 0 - 2 %  
 ABS. NEUTROPHILS 6.6 1.8 - 8.0 K/UL  
 ABS. LYMPHOCYTES 0.6 (L) 0.9 - 3.6 K/UL ABS. MONOCYTES 0.7 0.05 - 1.2 K/UL  
 ABS. EOSINOPHILS 0.2 0.0 - 0.4 K/UL  
 ABS. BASOPHILS 0.0 0.0 - 0.1 K/UL  
 DF AUTOMATED METABOLIC PANEL, BASIC Collection Time: 01/31/19  4:33 PM  
Result Value Ref Range Sodium 139 136 - 145 mmol/L Potassium 3.9 3.5 - 5.5 mmol/L Chloride 105 100 - 108 mmol/L  
 CO2 27 21 - 32 mmol/L Anion gap 7 3.0 - 18 mmol/L Glucose 197 (H) 74 - 99 mg/dL BUN 14 7.0 - 18 MG/DL Creatinine 1.21 0.6 - 1.3 MG/DL  
 BUN/Creatinine ratio 12 12 - 20 GFR est AA 50 (L) >60 ml/min/1.73m2 GFR est non-AA 42 (L) >60 ml/min/1.73m2 Calcium 9.2 8.5 - 10.1 MG/DL  
CARDIAC PANEL,(CK, CKMB & TROPONIN) Collection Time: 01/31/19  4:33 PM  
Result Value Ref Range CK 34 26 - 192 U/L  
 CK - MB <1.0 <3.6 ng/ml CK-MB Index  0.0 - 4.0 % CALCULATION NOT PERFORMED WHEN RESULT IS BELOW LINEAR LIMIT Troponin-I, QT 0.02 0.00 - 0.06 NG/ML  
URINALYSIS W/ RFLX MICROSCOPIC Collection Time: 01/31/19  5:40 PM  
Result Value Ref Range Color YELLOW Appearance CLEAR Specific gravity 1.016 1.005 - 1.030    
 pH (UA) 5.5 5.0 - 8.0 Protein NEGATIVE  NEG mg/dL Glucose NEGATIVE  NEG mg/dL Ketone NEGATIVE  NEG mg/dL Bilirubin NEGATIVE  NEG Blood MODERATE (A) NEG Urobilinogen 1.0 0.2 - 1.0 EU/dL Nitrites NEGATIVE  NEG Leukocyte Esterase TRACE (A) NEG URINE MICROSCOPIC ONLY Collection Time: 01/31/19  5:40 PM  
Result Value Ref Range WBC 0 to 3 0 - 4 /hpf  
 RBC 21 to 35 0 - 5 /hpf Epithelial cells FEW 0 - 5 /lpf Bacteria FEW (A) NEG /hpf Mucus 1+ (A) NEG /lpf Radiologic Studies -  
CT SPINE CERV WO CONT Final Result IMPRESSION:  
  
1. Limited by patient motion. 2.  No evidence of fracture or malalignment. 3.  Moderate cervical spondylosis CT HEAD WO CONT Final Result IMPRESSION:  
  
1. No evidence of acute intracranial process 2. Atrophy and chronic small vessel ischemic disease XR SHOULDER LT AP/LAT MIN 2 V    (Results Pending) XR PELV 1 OR 2 V    (Results Pending) XR Pelv: 
Preliminary read per ED Physician showed: hardware intact in the left femur. No Fx. No dislocation. XR Shoulder Left: 
Preliminary read per ED Physician showed: Fx of the left collar clavicle. Otherwise no other shoulder Fx or dislocation. Medical Decision Making I am the first provider for this patient. I reviewed the vital signs, available nursing notes, past medical history, past surgical history, family history and social history. Vital Signs-Reviewed the patient's vital signs. Pulse Oximetry Analysis -  96 % on RA, normal  
 
 
 
Records Reviewed: Nursing Notes and Old Medical Records (Time of Review: 5:21 PM) Provider Notes (Medical Decision Making): ddx ICH, CHI, musculoskeletal injury/fracture, metabolic, dehydration. Will get CT brain, head, and neck. Give pain medication. Check labs. MDM Medications  
traMADol (ULTRAM) tablet 50 mg (not administered)  
acetaminophen (TYLENOL) tablet 1,000 mg (1,000 mg Oral Given 1/31/19 1654) ED Course: Progress Notes, Reevaluation, and Consults: 
Acute clavicle fx. otw imaging noting acute Labs reassuring Feeling better after pain meds and very pleasant with family at bedside. Sling, ortho f/u I have reassessed the patient. I have discussed the workup, results and plan with the patient and patient is in agreement. Patient is feeling better. Patient will be prescribed tramadol. Patient was discharge in stable condition. Patient was given outpatient follow up. Patient is to return to emergency department if any new or worsening condition. Diagnosis Clinical Impression: 1. Closed head injury, initial encounter 2. Traumatic hematoma of forehead, initial encounter 3. Displaced fracture of lateral end of left clavicle, initial encounter for closed fracture 4. Bilateral hip pain Disposition: dc 
 Follow-up Information Follow up With Specialties Details Why Contact Info Eli Escobar MD Orthopedic Surgery Schedule an appointment as soon as possible for a visit in 1 day  [address] [address] [phone] Nicole Neely MD Internal Medicine In 1 week  [address] [address] 
[phone] [address] [facility] EMERGENCY DEPT Emergency Medicine  As needed, If symptoms worsen [place] [id number] [phone] Medication List  
  
START taking these medications   
traMADol 50 mg tablet Commonly known as:  ULTRAM 
Take 1 Tab by mouth every six (6) hours as needed for Pain. Max Daily Amount: 200 mg. ASK your doctor about these medications   
bimatoprost 0.01 % ophthalmic drops Commonly known as:  LUMIGAN 
  
COMBIGAN 0.2-0.5 % Drop ophthalmic solution Generic drug:  brimonidine-timolol ELIQUIS 2.5 mg tablet Generic drug:  apixaban 
  
ferrous sulfate 300 mg (60 mg iron)/5 mL syrup Take 5 mL by mouth daily (with breakfast). simvastatin 20 mg tablet Commonly known as:  ZOCOR 
  
TYLENOL 325 mg tablet Generic drug:  acetaminophen Where to Get Your Medications Information about where to get these medications is not yet available Ask your nurse or doctor about these medications · traMADol 50 mg tablet 
  
 
_______________________________ Attestations: 
Scribe Attestation Jaentt Larsen (Aj) acting as a scribe for and in the presence of Neymar Ji DO     
January 31, 2019 at 4:58 PM 
    
Provider Attestation:     
I personally performed the services described in the documentation, reviewed the documentation, as recorded by the scribe in my presence, and it accurately and completely records my words and actions. January 31, 2019 at 4:58 PM - Neymar Ji DO   
_______________________________

## 2019-01-31 NOTE — ED TRIAGE NOTES
Pt was an unwitnessed fall, faced up, by staff at [facility]. EMS called for further evaluation of Lt shoulder pain.

## 2019-02-01 NOTE — DISCHARGE INSTRUCTIONS
Patient Education        Learning About a Closed Head Injury  What is a closed head injury? A closed head injury happens when your head gets hit hard. The strong force of the blow causes your brain to shake in your skull. This movement can cause the brain to bruise, swell, or tear. Sometimes nerves or blood vessels also get damaged. This can cause bleeding in or around the brain. A concussion is a type of closed head injury. What are the symptoms? If you have a mild concussion, you may have a mild headache or feel \"not quite right. \" These symptoms are common. They usually go away over a few days to 4 weeks. But sometimes after a concussion, you feel like you can't function as well as before the injury. And you have new symptoms. This is called postconcussive syndrome. You may:  · Find it harder to solve problems, think, concentrate, or remember. · Have headaches. · Have changes in your sleep patterns, such as not being able to sleep or sleeping all the time. · Have changes in your personality. · Not be interested in your usual activities. · Feel angry or anxious without a clear reason. · Lose your sense of taste or smell. · Be dizzy, lightheaded, or unsteady. It may be hard to stand or walk. How is a closed head injury treated? Any person who may have a concussion needs to see a doctor. Some people have to stay in the hospital to be watched. Others can go home safely. If you go home, follow your doctor's instructions. He or she will tell you if you need someone to watch you closely for the next 24 hours or longer. Rest is the best treatment. Get plenty of sleep at night. And try to rest during the day. · Avoid activities that are physically or mentally demanding. These include housework, exercise, and schoolwork. And don't play video games, send text messages, or use the computer. You may need to change your school or work schedule to be able to avoid these activities.   · Ask your doctor when it's okay to drive, ride a bike, or operate machinery. · Take an over-the-counter pain medicine, such as acetaminophen (Tylenol), ibuprofen (Advil, Motrin), or naproxen (Aleve). Be safe with medicines. Read and follow all instructions on the label. · Check with your doctor before you use any other medicines for pain. · Do not drink alcohol or use illegal drugs. They can slow recovery. They can also increase your risk of getting a second head injury. Follow-up care is a key part of your treatment and safety. Be sure to make and go to all appointments, and call your doctor if you are having problems. It's also a good idea to know your test results and keep a list of the medicines you take. Where can you learn more? Go to http://colleen-dusty.info/. Enter E235 in the search box to learn more about \"Learning About a Closed Head Injury. \"  Current as of: Giselle 3, 2018  Content Version: 11.9  © 8088-1012 [employer]. Care instructions adapted under license by [employer] (which disclaims liability or warranty for this information). If you have questions about a medical condition or this instruction, always ask your healthcare professional. [address] any warranty or liability for your use of this information.

## 2019-02-01 NOTE — ED NOTES
Nursing home called and needs a hard copy of prescription. I went into chart to re-print RX, however my name then printed on script. Attending made aware.

## 2019-02-01 NOTE — ED NOTES
Maximino Anderson is a 80 y.o. female that was discharged in stable. Pt was accompanied by daughter. Pt is not driving. The patients diagnosis, condition and treatment were explained to  patient and guardian and aftercare instructions were given. The patient and guardian verbalized understanding. Patient armband removed and shredded.

## 2019-02-08 ENCOUNTER — OFFICE VISIT (OUTPATIENT)
Dept: ORTHOPEDIC SURGERY | Facility: CLINIC | Age: 84
End: 2019-02-08

## 2019-02-08 VITALS
SYSTOLIC BLOOD PRESSURE: 152 MMHG | BODY MASS INDEX: 26.58 KG/M2 | WEIGHT: 150 LBS | RESPIRATION RATE: 16 BRPM | HEART RATE: 73 BPM | DIASTOLIC BLOOD PRESSURE: 99 MMHG | HEIGHT: 63 IN | OXYGEN SATURATION: 96 % | TEMPERATURE: 97 F

## 2019-02-08 DIAGNOSIS — S42.032A CLOSED DISPLACED FRACTURE OF ACROMIAL END OF LEFT CLAVICLE, INITIAL ENCOUNTER: Primary | ICD-10-CM

## 2019-02-08 RX ORDER — TRAMADOL HYDROCHLORIDE 50 MG/1
50 TABLET ORAL
Qty: 40 TAB | Refills: 0 | Status: SHIPPED | OUTPATIENT
Start: 2019-02-08 | End: 2020-03-02

## 2019-02-08 RX ORDER — AMOXICILLIN AND CLAVULANATE POTASSIUM 250; 62.5 MG/5ML; MG/5ML
POWDER, FOR SUSPENSION ORAL
Refills: 0 | COMMUNITY
Start: 2018-12-03 | End: 2020-01-08

## 2019-02-08 NOTE — PROGRESS NOTES
Patient: Isi Ac                MRN: [id number]       SSN: [id number] YOB: 1926        AGE: 80 y.o. SEX: female Body mass index is 26.57 kg/m². PCP: Barry Cheatham MD 
02/08/19 Chief Complaint: Left clavicle injuryHISTORY OF PRESENT ILLNESS:   Arely King is a 80year-old female who comes in the office today with her daughter. She injured her left clavicle about a week ago. She fell and [facility] with a displaced left distal clavicle fracture. She was placed into a sling. She was sent home. She follows up today for her first visit. She reports minimal pain today. She does have some issues with dementia. Her daughter says that she does ask for Tramadol occasionally, but is also taking Tylenol. No other injuries or falls. Past Medical History:  
Diagnosis Date  Anticoagulated on Coumadin  Anticoagulation goal of INR 2 to 3   
 Decreased calculated glomerular filtration rate (GFR) 8/1/2016 Calculated GFR equivalent to that of CKD stage 3 = 30-59 ml/min  Dementia  DVT (deep venous thrombosis) ([facility] [address]  Dyslipidemia  Essential hypertension  Glaucoma  Osteoporosis  Postoperative anemia due to acute blood loss 7/29/2016 Family History Problem Relation Age of Onset  Heart Disease Mother  Diabetes Mother  Arthritis-osteo Father  Cancer Brother Current Outpatient Medications Medication Sig Dispense Refill  traMADol (ULTRAM) 50 mg tablet Take 1 Tab by mouth every six (6) hours as needed for Pain. Max Daily Amount: 200 mg. 40 Tab 0  
 apixaban (ELIQUIS) 2.5 mg tablet Take 2.5 mg by mouth two (2) times a day.  traMADol (ULTRAM) 50 mg tablet Take 1 Tab by mouth every six (6) hours as needed for Pain. Max Daily Amount: 200 mg. 16 Tab 0  
 bimatoprost (LUMIGAN) 0.01 % ophthalmic drops Administer 1 Drop to both eyes every evening.  brimonidine-timolol (COMBIGAN) 0.2-0.5 % drop ophthalmic solution 1 Drop every twelve (12) hours.  simvastatin (ZOCOR) 20 mg tablet Take 20 mg by mouth nightly. Indications: DYSLIPIDEMIA  amoxicillin-clavulanate (AUGMENTIN) 250-62.5 mg/5 mL suspension give 10 milliliter by mouth every 12 hours for 10 days  0  
 acetaminophen (TYLENOL) 325 mg tablet Take  by mouth every four (4) hours as needed for Pain.  ferrous sulfate 300 mg (60 mg iron)/5 mL syrup Take 5 mL by mouth daily (with breakfast). 60 mL 0 No Known Allergies History reviewed. No pertinent surgical history. Social History Socioeconomic History  Marital status:  Spouse name: Not on file  Number of children: Not on file  Years of education: Not on file  Highest education level: Not on file Social Needs  Financial resource strain: Not on file  Food insecurity - worry: Not on file  Food insecurity - inability: Not on file  Transportation needs - medical: Not on file  Transportation needs - non-medical: Not on file Occupational History  Not on file Tobacco Use  Smoking status: Never Smoker  Smokeless tobacco: Never Used Substance and Sexual Activity  Alcohol use: No  
 Drug use: No  
 Sexual activity: Not on file Other Topics Concern  Not on file Social History Narrative  Not on file REVIEW OF SYSTEMS:   
 
CON: negative for recent weight loss/gain, fever, or chills EYE: negative for double or blurry vision ENT: negative for hoarseness RS:   negative for cough, URI, SOB 
CV:  negative for chest pain, palpitations GI:    negative for blood in stool, nausea/vomiting :  negative for blood in urine MS: As per HPI Other systems reviewed and noted below. PHYSICAL EXAMINATION: 
Visit Vitals BP (!) 152/99 (BP 1 Location: Right arm, BP Patient Position: Sitting) Pulse 73 Temp 97 °F (36.1 °C) (Oral) Resp 16 Ht 5' 3\" (1.6 m) Wt 150 lb (68 kg) LMP  (LMP Unknown) SpO2 96% BMI 26.57 kg/m² Body mass index is 26.57 kg/m². GENERAL: Alert and oriented x3, in no acute distress, well-developed, well-nourished. HEENT: Normocephalic, atraumatic. RESP: Non labored breathing with equal chest rise on inspiration. CV: Well perfused extremities. No cyanosis or clubbing noted. ABDOMEN: Soft, non-tender, non-distended. PHYSICAL EXAMINATION:   Physical exam of the left shoulder with ecchymosis around the distal clavicle, left chest, left upper arm and left shoulder. She has minimal tenderness to palpation over the clavicle. I do not feel any step off or crepitus. She actually has active passive range of motion to about 120 degrees without pain. Otherwise, neurovascularly intact. She is not in the sling today. IMAGING:   X-rays of the left clavicle from the ER were reviewed. This shows a displaced comminuted distal clavicle fracture. ASSESSMENT/PLAN:   Jonathan Carr is a 80year-old female with a left displaced distal clavicle fracture. I recommended nonoperative treatment for this for several reasons. One, she is not in a lot of pain and two due to her age and activity level, I do not recommend surgery. I put her back in a sling today. I would like to see her back in three weeks and gave her a refill of Tramadol.    
 
 
 
 
Electronically signed by: Tammy Vergara MD

## 2019-04-26 ENCOUNTER — IMPORTED ENCOUNTER (OUTPATIENT)
Dept: URBAN - METROPOLITAN AREA CLINIC 1 | Facility: CLINIC | Age: 84
End: 2019-04-26

## 2019-04-26 PROBLEM — H10.45: Noted: 2019-04-26

## 2019-04-26 PROBLEM — Z96.1: Noted: 2019-04-26

## 2019-04-26 PROBLEM — H04.123: Noted: 2019-04-26

## 2019-04-26 PROBLEM — H01.004: Noted: 2019-04-26

## 2019-04-26 PROBLEM — H01.001: Noted: 2019-04-26

## 2019-04-26 PROBLEM — H43.811: Noted: 2019-04-26

## 2019-04-26 PROBLEM — H26.491: Noted: 2019-04-26

## 2019-04-26 PROBLEM — H40.1133: Noted: 2019-04-26

## 2019-04-26 PROBLEM — H16.143: Noted: 2019-04-26

## 2019-04-26 PROCEDURE — 92014 COMPRE OPH EXAM EST PT 1/>: CPT

## 2019-04-26 NOTE — PATIENT DISCUSSION
1.  Severe Open Angle Glaucoma OU (CD 0.800.90) - IOP stable. Possible allergy to Combigan DC Combigan BID OU. Cont Lumigan QHS OU (written order given). Patient advised to be compliant with gtts. Condition was discussed with patient and patient understands. Will continue to monitor patient for any progression in condition. Patient was advised to call us with any problems questions or concerns. 2.  Allergic Conjunctivitis OU -- The condition was  discussed with the patient. Avoidance of allergens and cool compresses were recommended. Likely allergy to Combigan will DC. Begin OTC Zaditor BID OU. 3.  Anterior Blepharitis OU - Daily Hot compresses and lid scrubs were recommended. 4. JANN w/ PEK OU- Recommend PF ATs QID OU routinely 5. PCO  OD (Posterior Capsule Opacification)  Observe and consider yag cap when pt feels pco visually significant and visual acuity decreases to appropriate level. 6. Pseudophakia OU - H/o YAG Cap OS 7. PVD w/o Tear OD - RD precautions. Return for an appointment in 2 months 10/OCT with Dr. Moose Khan.

## 2019-10-04 NOTE — PROCEDURES
Three views of the wrist show sclerotic changes at the fracture site consistent with healing callus. No change in position. no

## 2020-01-03 ENCOUNTER — HOSPITAL ENCOUNTER (OUTPATIENT)
Dept: LAB | Age: 85
Discharge: HOME | End: 2020-01-03

## 2020-01-03 ENCOUNTER — HOSPITAL ENCOUNTER (OUTPATIENT)
Dept: LAB | Age: 85
Discharge: HOME | End: 2020-01-03
Payer: MEDICARE

## 2020-01-03 LAB
INR PPP: 1 (ref 0.8–1.2)
PROTHROMBIN TIME: 13 SEC (ref 11.5–15.2)

## 2020-01-03 PROCEDURE — 85610 PROTHROMBIN TIME: CPT

## 2020-01-06 ENCOUNTER — HOSPITAL ENCOUNTER (OUTPATIENT)
Dept: LAB | Age: 85
Discharge: HOME | End: 2020-01-06
Payer: MEDICARE

## 2020-01-06 LAB
INR PPP: 2.5 (ref 0.8–1.2)
PROTHROMBIN TIME: 26.5 SEC (ref 11.5–15.2)

## 2020-01-06 PROCEDURE — 85610 PROTHROMBIN TIME: CPT

## 2020-01-06 PROCEDURE — 36415 COLL VENOUS BLD VENIPUNCTURE: CPT

## 2020-01-07 ENCOUNTER — HOSPITAL ENCOUNTER (OUTPATIENT)
Dept: LAB | Age: 85
Discharge: HOME | End: 2020-01-07
Payer: MEDICARE

## 2020-01-07 ENCOUNTER — HOSPITAL ENCOUNTER (OUTPATIENT)
Dept: LAB | Age: 85
Discharge: HOME | End: 2020-01-07

## 2020-01-07 LAB
INR PPP: 3 (ref 0.8–1.2)
PROTHROMBIN TIME: 30.5 SEC (ref 11.5–15.2)

## 2020-01-07 PROCEDURE — 85610 PROTHROMBIN TIME: CPT

## 2020-01-08 ENCOUNTER — HOSPITAL ENCOUNTER (OUTPATIENT)
Dept: LAB | Age: 85
Discharge: HOME | End: 2020-01-08
Payer: MEDICARE

## 2020-01-08 ENCOUNTER — HOSPITAL ENCOUNTER (OUTPATIENT)
Dept: GENERAL RADIOLOGY | Age: 85
Discharge: HOME | End: 2020-01-08
Attending: NURSE PRACTITIONER

## 2020-01-08 ENCOUNTER — HOSPITAL ENCOUNTER (EMERGENCY)
Age: 85
Discharge: HOME | End: 2020-01-08
Attending: EMERGENCY MEDICINE
Payer: MEDICARE

## 2020-01-08 ENCOUNTER — APPOINTMENT (OUTPATIENT)
Dept: VASCULAR SURGERY | Age: 85
End: 2020-01-08
Attending: NURSE PRACTITIONER
Payer: MEDICARE

## 2020-01-08 ENCOUNTER — HOSPITAL ENCOUNTER (OUTPATIENT)
Dept: CT IMAGING | Age: 85
Discharge: HOME | End: 2020-01-08
Attending: NURSE PRACTITIONER

## 2020-01-08 ENCOUNTER — HOSPITAL ENCOUNTER (OUTPATIENT)
Dept: LAB | Age: 85
Discharge: HOME | End: 2020-01-08

## 2020-01-08 VITALS
WEIGHT: 150 LBS | SYSTOLIC BLOOD PRESSURE: 117 MMHG | HEART RATE: 63 BPM | DIASTOLIC BLOOD PRESSURE: 56 MMHG | HEIGHT: 60 IN | TEMPERATURE: 97.5 F | RESPIRATION RATE: 15 BRPM | OXYGEN SATURATION: 97 % | BODY MASS INDEX: 29.45 KG/M2

## 2020-01-08 DIAGNOSIS — R41.82 ALTERED MENTAL STATUS, UNSPECIFIED ALTERED MENTAL STATUS TYPE: Primary | ICD-10-CM

## 2020-01-08 DIAGNOSIS — B37.2 SKIN YEAST INFECTION: ICD-10-CM

## 2020-01-08 DIAGNOSIS — I82.451 ACUTE DEEP VEIN THROMBOSIS (DVT) OF RIGHT PERONEAL VEIN (HCC): ICD-10-CM

## 2020-01-08 LAB
ALBUMIN SERPL-MCNC: 2.6 G/DL (ref 3.4–5)
ALBUMIN/GLOB SERPL: 0.7 {RATIO} (ref 0.8–1.7)
ALP SERPL-CCNC: 102 U/L (ref 45–117)
ALT SERPL-CCNC: 21 U/L (ref 13–56)
ANION GAP SERPL CALC-SCNC: 6 MMOL/L (ref 3–18)
APPEARANCE UR: CLEAR
AST SERPL-CCNC: 34 U/L (ref 10–38)
BACTERIA URNS QL MICRO: ABNORMAL /HPF
BASOPHILS # BLD: 0 K/UL (ref 0–0.06)
BASOPHILS NFR BLD: 0 % (ref 0–3)
BILIRUB SERPL-MCNC: 0.8 MG/DL (ref 0.2–1)
BILIRUB UR QL: ABNORMAL
BUN SERPL-MCNC: 26 MG/DL (ref 7–18)
BUN/CREAT SERPL: 31 (ref 12–20)
CALCIUM SERPL-MCNC: 9.2 MG/DL (ref 8.5–10.1)
CHLORIDE SERPL-SCNC: 105 MMOL/L (ref 100–111)
CK MB CFR SERPL CALC: NORMAL % (ref 0–4)
CK MB CFR SERPL CALC: NORMAL % (ref 0–4)
CK MB SERPL-MCNC: <1 NG/ML (ref 5–25)
CK MB SERPL-MCNC: <1 NG/ML (ref 5–25)
CK SERPL-CCNC: 47 U/L (ref 26–192)
CK SERPL-CCNC: 54 U/L (ref 26–192)
CO2 SERPL-SCNC: 28 MMOL/L (ref 21–32)
COLOR UR: ABNORMAL
CREAT SERPL-MCNC: 0.84 MG/DL (ref 0.6–1.3)
DIFFERENTIAL METHOD BLD: ABNORMAL
EOSINOPHIL # BLD: 0 K/UL (ref 0–0.4)
EOSINOPHIL NFR BLD: 0 % (ref 0–5)
EPITH CASTS URNS QL MICRO: ABNORMAL /LPF (ref 0–5)
ERYTHROCYTE [DISTWIDTH] IN BLOOD BY AUTOMATED COUNT: 14.4 % (ref 11.6–14.5)
GLOBULIN SER CALC-MCNC: 4 G/DL (ref 2–4)
GLUCOSE SERPL-MCNC: 101 MG/DL (ref 74–99)
GLUCOSE UR STRIP.AUTO-MCNC: NEGATIVE MG/DL
HCT VFR BLD AUTO: 33.2 % (ref 35–45)
HGB BLD-MCNC: 10.5 G/DL (ref 12–16)
HGB UR QL STRIP: NEGATIVE
INR PPP: 2.6 (ref 0.8–1.2)
INR PPP: 3.1 (ref 0.8–1.2)
KETONES UR QL STRIP.AUTO: NEGATIVE MG/DL
LEUKOCYTE ESTERASE UR QL STRIP.AUTO: ABNORMAL
LYMPHOCYTES # BLD: 0.9 K/UL (ref 0.8–3.5)
LYMPHOCYTES NFR BLD: 8 % (ref 20–51)
MCH RBC QN AUTO: 29.8 PG (ref 24–34)
MCHC RBC AUTO-ENTMCNC: 31.6 G/DL (ref 31–37)
MCV RBC AUTO: 94.3 FL (ref 74–97)
MONOCYTES # BLD: 0.5 K/UL (ref 0–1)
MONOCYTES NFR BLD: 5 % (ref 2–9)
NEUTS SEG # BLD: 9.4 K/UL (ref 1.8–8)
NEUTS SEG NFR BLD: 87 % (ref 42–75)
NITRITE UR QL STRIP.AUTO: NEGATIVE
PH UR STRIP: 5 [PH] (ref 5–8)
PLATELET # BLD AUTO: 494 K/UL (ref 135–420)
PLATELET COMMENTS,PCOM: ABNORMAL
PMV BLD AUTO: 10.4 FL (ref 9.2–11.8)
POTASSIUM SERPL-SCNC: 4.5 MMOL/L (ref 3.5–5.5)
PROT SERPL-MCNC: 6.6 G/DL (ref 6.4–8.2)
PROT UR STRIP-MCNC: NEGATIVE MG/DL
PROTHROMBIN TIME: 27.7 SEC (ref 11.5–15.2)
PROTHROMBIN TIME: 31.9 SEC (ref 11.5–15.2)
RBC # BLD AUTO: 3.52 M/UL (ref 4.2–5.3)
RBC #/AREA URNS HPF: ABNORMAL /HPF (ref 0–5)
RBC MORPH BLD: ABNORMAL
SODIUM SERPL-SCNC: 139 MMOL/L (ref 136–145)
SP GR UR REFRACTOMETRY: 1.02 (ref 1–1.03)
TROPONIN I SERPL-MCNC: <0.02 NG/ML (ref 0–0.04)
TROPONIN I SERPL-MCNC: <0.02 NG/ML (ref 0–0.04)
UROBILINOGEN UR QL STRIP.AUTO: 2 EU/DL (ref 0.2–1)
WBC # BLD AUTO: 10.8 K/UL (ref 4.6–13.2)
WBC URNS QL MICRO: ABNORMAL /HPF (ref 0–4)

## 2020-01-08 PROCEDURE — 80053 COMPREHEN METABOLIC PANEL: CPT

## 2020-01-08 PROCEDURE — 85025 COMPLETE CBC W/AUTO DIFF WBC: CPT

## 2020-01-08 PROCEDURE — 99285 EMERGENCY DEPT VISIT HI MDM: CPT

## 2020-01-08 PROCEDURE — 93971 EXTREMITY STUDY: CPT

## 2020-01-08 PROCEDURE — 70450 CT HEAD/BRAIN W/O DYE: CPT

## 2020-01-08 PROCEDURE — 74011250637 HC RX REV CODE- 250/637: Performed by: NURSE PRACTITIONER

## 2020-01-08 PROCEDURE — 74011250636 HC RX REV CODE- 250/636: Performed by: NURSE PRACTITIONER

## 2020-01-08 PROCEDURE — 82550 ASSAY OF CK (CPK): CPT

## 2020-01-08 PROCEDURE — 85610 PROTHROMBIN TIME: CPT

## 2020-01-08 PROCEDURE — 87086 URINE CULTURE/COLONY COUNT: CPT

## 2020-01-08 PROCEDURE — 81001 URINALYSIS AUTO W/SCOPE: CPT

## 2020-01-08 PROCEDURE — 93005 ELECTROCARDIOGRAM TRACING: CPT

## 2020-01-08 PROCEDURE — 71045 X-RAY EXAM CHEST 1 VIEW: CPT

## 2020-01-08 RX ORDER — MEMANTINE HYDROCHLORIDE 5 MG/1
10 TABLET ORAL
COMMUNITY

## 2020-01-08 RX ORDER — OXYCODONE HYDROCHLORIDE 5 MG/1
2.5 TABLET ORAL
COMMUNITY
Start: 2020-01-01 | End: 2020-03-02

## 2020-01-08 RX ORDER — ATENOLOL 50 MG/1
50 TABLET ORAL
COMMUNITY
End: 2020-03-02

## 2020-01-08 RX ORDER — NYSTATIN 100000 U/G
OINTMENT TOPICAL 2 TIMES DAILY
Qty: 15 G | Refills: 0 | Status: SHIPPED | OUTPATIENT
Start: 2020-01-08 | End: 2020-03-02

## 2020-01-08 RX ORDER — LISINOPRIL 20 MG/1
20 TABLET ORAL DAILY
COMMUNITY
End: 2020-03-02

## 2020-01-08 RX ORDER — OXYCODONE AND ACETAMINOPHEN 5; 325 MG/1; MG/1
1 TABLET ORAL
Status: DISCONTINUED | OUTPATIENT
Start: 2020-01-08 | End: 2020-01-09 | Stop reason: HOSPADM

## 2020-01-08 RX ORDER — NYSTATIN 100000 U/G
OINTMENT TOPICAL 2 TIMES DAILY
Qty: 15 G | Refills: 0 | Status: SHIPPED | OUTPATIENT
Start: 2020-01-08 | End: 2020-01-08

## 2020-01-08 RX ORDER — OXYCODONE AND ACETAMINOPHEN 5; 325 MG/1; MG/1
1 TABLET ORAL
Status: COMPLETED | OUTPATIENT
Start: 2020-01-08 | End: 2020-01-08

## 2020-01-08 RX ADMIN — APIXABAN 5 MG: 2.5 TABLET, FILM COATED ORAL at 20:57

## 2020-01-08 RX ADMIN — OXYCODONE HYDROCHLORIDE AND ACETAMINOPHEN 1 TABLET: 5; 325 TABLET ORAL at 17:46

## 2020-01-08 RX ADMIN — SODIUM CHLORIDE 500 ML: 900 INJECTION, SOLUTION INTRAVENOUS at 17:22

## 2020-01-08 NOTE — ED PROVIDER NOTES
[facility]  EMERGENCY DEPARTMENT HISTORY AND PHYSICAL EXAM    3:45 PM      Date: 1/8/2020  Patient Name: Sandy Ray    History of Presenting Illness     Chief Complaint   Patient presents with    Fatigue         History Provided By: EMS and nursing home staff    Additional History (Context): Sandy Ray is a 80 y.o. female with Dementia, DVT, stroke, HTN who came in via EMS. Per EMS nursing home called pt was unresponsive and when they arrived pt was awake alert and able to answer questions. Nursing home staff reports pt was getting ready to go to PT and was not answering them on their way to PT while she was sitting on the chair but had her eyes open the whole time. Per PT person pt felt clammy. Pt was responding to them once they put her in bed. Pt does not know why she is here and denies any complaints. Pt is at the nursing home after having broken her right hip and had partial replacement done by Dr. Mojgan Hollingsworth at [facility]. Pt denies chest pain, nausea, vomiting, headache or dizziness. Pt was sent to Nursing home January 1st. Pt takes eliquis. Nursing home paperwork shows pt was taking coumadin as well but was placed on hold. Family at bedside reports pt has not taken coumadin for 4 years and has been on eliquis since. PCP: Xiomara Cunningham MD    Current Facility-Administered Medications   Medication Dose Route Frequency Provider Last Rate Last Dose    oxyCODONE-acetaminophen (PERCOCET) 5-325 mg per tablet 1 Tab  1 Tab Oral NOW Ty Kim NP   Stopped at 01/08/20 1721     Current Outpatient Medications   Medication Sig Dispense Refill    oxyCODONE IR (ROXICODONE) 5 mg immediate release tablet Take 2.5 mg by mouth.  atenolol (TENORMIN) 50 mg tablet Take 50 mg by mouth.  lisinopril (PRINIVIL, ZESTRIL) 20 mg tablet Take 20 mg by mouth daily.  memantine (NAMENDA) 5 mg tablet Take 10 mg by mouth.  [START ON 1/9/2020] apixaban (ELIQUIS) 2.5 mg tablet Start taking 5mg BID x 1 week. Then decrease to 2.5mg BID. Indications: blood clot in a deep vein of the extremities 90 Tab 0    nystatin (MYCOSTATIN) 100,000 unit/gram ointment Apply  to affected area two (2) times a day. 15 g 0    acetaminophen (TYLENOL) 325 mg tablet Take  by mouth every four (4) hours as needed for Pain.  bimatoprost (LUMIGAN) 0.01 % ophthalmic drops Administer 1 Drop to both eyes every evening.  traMADol (ULTRAM) 50 mg tablet Take 1 Tab by mouth every six (6) hours as needed for Pain. Max Daily Amount: 200 mg. 40 Tab 0    traMADol (ULTRAM) 50 mg tablet Take 1 Tab by mouth every six (6) hours as needed for Pain. Max Daily Amount: 200 mg. 16 Tab 0    ferrous sulfate 300 mg (60 mg iron)/5 mL syrup Take 5 mL by mouth daily (with breakfast). 60 mL 0    simvastatin (ZOCOR) 20 mg tablet Take 20 mg by mouth nightly. Indications: DYSLIPIDEMIA         Past History     Past Medical History:  Past Medical History:   Diagnosis Date    Anticoagulated on Coumadin     Anticoagulation goal of INR 2 to 3     Decreased calculated glomerular filtration rate (GFR) 8/1/2016    Calculated GFR equivalent to that of CKD stage 3 = 30-59 ml/min    Dementia (HCC)     DVT (deep venous thrombosis) (HCC)     Dyslipidemia     Essential hypertension     Glaucoma     Osteoporosis     Postoperative anemia due to acute blood loss 7/29/2016       Past Surgical History:  History reviewed. No pertinent surgical history. Family History:  Family History   Problem Relation Age of Onset    Heart Disease Mother     Diabetes Mother     Arthritis-osteo Father     Cancer Brother        Social History:  Social History     Tobacco Use    Smoking status: Never Smoker    Smokeless tobacco: Never Used   Substance Use Topics    Alcohol use: No    Drug use: No       Allergies:  No Known Allergies      Review of Systems       Review of Systems   Constitutional: Negative for chills and fever. Respiratory: Negative for shortness of breath. Cardiovascular: Negative for chest pain. Gastrointestinal: Negative for abdominal pain, nausea and vomiting. Skin: Negative for rash. Neurological: Negative for weakness. Psychiatric/Behavioral: Positive for confusion. All other systems reviewed and are negative. Physical Exam     Visit Vitals  /55   Pulse 60   Temp 97.5 °F (36.4 °C)   Resp 14   Ht 5' (1.524 m)   Wt 68 kg (150 lb)   LMP  (LMP Unknown)   SpO2 96%   BMI 29.29 kg/m²         Physical Exam  Vitals signs reviewed. Constitutional:       General: She is not in acute distress. Appearance: Normal appearance. She is well-developed. She is not ill-appearing or toxic-appearing. HENT:      Head: Normocephalic and atraumatic. Eyes:      Conjunctiva/sclera: Conjunctivae normal.      Pupils: Pupils are equal, round, and reactive to light. Neck:      Musculoskeletal: Normal range of motion. Trachea: Trachea normal.   Cardiovascular:      Rate and Rhythm: Normal rate and regular rhythm. Pulmonary:      Effort: Pulmonary effort is normal.      Breath sounds: Normal breath sounds. Abdominal:      General: Bowel sounds are normal. There is no distension or abdominal bruit. Palpations: Abdomen is soft. Abdomen is not rigid. There is no shifting dullness, fluid wave, mass or pulsatile mass. Tenderness: There is no tenderness. There is no guarding. Negative signs include Chiang's sign and McBurney's sign. Musculoskeletal:      Right hip: She exhibits tenderness. Right knee: Normal.      Right lower leg: She exhibits tenderness and swelling. Legs:       Comments: Right calf tenderness. Positive homans's sign. Pulse present. Sensation intact to foot. Pt able to wiggle toes and move foot. Neurological:      Mental Status: She is alert. She is disoriented. Comments: Oriented to person only. Not oriented to situation. Which family reports is her baseline.             Diagnostic Study Results     Labs -  Recent Results (from the past 12 hour(s))   EKG, 12 LEAD, INITIAL    Collection Time: 01/08/20  4:07 PM   Result Value Ref Range    Ventricular Rate 58 BPM    Atrial Rate 58 BPM    P-R Interval 194 ms    QRS Duration 86 ms    Q-T Interval 464 ms    QTC Calculation (Bezet) 455 ms    Calculated P Axis 3 degrees    Calculated T Axis 6 degrees    Diagnosis       Sinus bradycardia  Minimal voltage criteria for LVH, may be normal variant  Borderline ECG  When compared with ECG of 31-JAN-2019 16:26,  No significant change was found     CBC WITH AUTOMATED DIFF    Collection Time: 01/08/20  4:40 PM   Result Value Ref Range    WBC 10.8 4.6 - 13.2 K/uL    RBC 3.52 (L) 4.20 - 5.30 M/uL    HGB 10.5 (L) 12.0 - 16.0 g/dL    HCT 33.2 (L) 35.0 - 45.0 %    MCV 94.3 74.0 - 97.0 FL    MCH 29.8 24.0 - 34.0 PG    MCHC 31.6 31.0 - 37.0 g/dL    RDW 14.4 11.6 - 14.5 %    PLATELET 301 (H) 497 - 420 K/uL    MPV 10.4 9.2 - 11.8 FL    NEUTROPHILS 87 (H) 42 - 75 %    LYMPHOCYTES 8 (L) 20 - 51 %    MONOCYTES 5 2 - 9 %    EOSINOPHILS 0 0 - 5 %    BASOPHILS 0 0 - 3 %    ABS. NEUTROPHILS 9.4 (H) 1.8 - 8.0 K/UL    ABS. LYMPHOCYTES 0.9 0.8 - 3.5 K/UL    ABS. MONOCYTES 0.5 0 - 1.0 K/UL    ABS. EOSINOPHILS 0.0 0.0 - 0.4 K/UL    ABS.  BASOPHILS 0.0 0.0 - 0.06 K/UL    DF MANUAL      PLATELET COMMENTS Increased Platelets      RBC COMMENTS NORMOCYTIC, NORMOCHROMIC     METABOLIC PANEL, COMPREHENSIVE    Collection Time: 01/08/20  4:40 PM   Result Value Ref Range    Sodium 139 136 - 145 mmol/L    Potassium 4.5 3.5 - 5.5 mmol/L    Chloride 105 100 - 111 mmol/L    CO2 28 21 - 32 mmol/L    Anion gap 6 3.0 - 18 mmol/L    Glucose 101 (H) 74 - 99 mg/dL    BUN 26 (H) 7.0 - 18 MG/DL    Creatinine 0.84 0.6 - 1.3 MG/DL    BUN/Creatinine ratio 31 (H) 12 - 20      GFR est AA >60 >60 ml/min/1.73m2    GFR est non-AA >60 >60 ml/min/1.73m2    Calcium 9.2 8.5 - 10.1 MG/DL    Bilirubin, total 0.8 0.2 - 1.0 MG/DL    ALT (SGPT) 21 13 - 56 U/L    AST (SGOT) 34 10 - 38 U/L Alk. phosphatase 102 45 - 117 U/L    Protein, total 6.6 6.4 - 8.2 g/dL    Albumin 2.6 (L) 3.4 - 5.0 g/dL    Globulin 4.0 2.0 - 4.0 g/dL    A-G Ratio 0.7 (L) 0.8 - 1.7     CARDIAC PANEL,(CK, CKMB & TROPONIN)    Collection Time: 01/08/20  4:40 PM   Result Value Ref Range    CK 54 26 - 192 U/L    CK - MB <1.0 <3.6 ng/ml    CK-MB Index  0.0 - 4.0 %     CALCULATION NOT PERFORMED WHEN RESULT IS BELOW LINEAR LIMIT    Troponin-I, QT <0.02 0.0 - 0.045 NG/ML   PROTHROMBIN TIME + INR    Collection Time: 01/08/20  4:40 PM   Result Value Ref Range    Prothrombin time 31.9 (H) 11.5 - 15.2 sec    INR 3.1 (H) 0.8 - 1.2     URINALYSIS W/ RFLX MICROSCOPIC    Collection Time: 01/08/20  6:55 PM   Result Value Ref Range    Color DARK YELLOW      Appearance CLEAR      Specific gravity 1.020 1.005 - 1.030      pH (UA) 5.0 5.0 - 8.0      Protein NEGATIVE  NEG mg/dL    Glucose NEGATIVE  NEG mg/dL    Ketone NEGATIVE  NEG mg/dL    Bilirubin SMALL (A) NEG      Blood NEGATIVE  NEG      Urobilinogen 2.0 (H) 0.2 - 1.0 EU/dL    Nitrites NEGATIVE  NEG      Leukocyte Esterase TRACE (A) NEG     URINE MICROSCOPIC ONLY    Collection Time: 01/08/20  6:55 PM   Result Value Ref Range    WBC 0 to 3 0 - 4 /hpf    RBC 0 to 3 0 - 5 /hpf    Epithelial cells 3+ 0 - 5 /lpf    Bacteria 1+ (A) NEG /hpf   CARDIAC PANEL,(CK, CKMB & TROPONIN)    Collection Time: 01/08/20  7:35 PM   Result Value Ref Range    CK 47 26 - 192 U/L    CK - MB <1.0 <3.6 ng/ml    CK-MB Index  0.0 - 4.0 %     CALCULATION NOT PERFORMED WHEN RESULT IS BELOW LINEAR LIMIT    Troponin-I, QT <0.02 0.0 - 0.045 NG/ML       Radiologic Studies -   CT HEAD WO CONT   Final Result   IMPRESSION:   1. No acute intracranial hemorrhage, mass effect or midline structural shift. No   change. Follow-up with MRI is recommended if acute ischemia is suspected given   significant limitations of CT. XR CHEST PORT   Final Result   IMPRESSION:      Stable mild cardiomegaly. Atherosclerosis. Medical Decision Making   I am the first provider for this patient. I reviewed the vital signs, available nursing notes, past medical history, past surgical history, family history and social history. Vital Signs-Reviewed the patient's vital signs. Pulse Oximetry Analysis -  100 on room air (Interpretation)    Cardiac Monitor:  Rate: 58  Rhythm:  Sinus Raphael Fermin      Records Reviewed: Nursing Notes, Old Medical Records and Ambulance Run Sheet (Time of Review: 3:45 PM)    ED Course: Progress Notes, Reevaluation, and Consults:  6:28 PM spoke to Dr. Moctezuma Session, he is going to review the patient's chart and will give me a call back. Dr. Moctezuma Session wants patient to be started on eliquis 5mg BID for 1 week then back to 2.5 mg bid and to have coumadin discontinued. 7:17 PM Spoke to [address] [place] [address] from nursing home  And reports pt did not take any eliquis this morning. Will give first dose here. 7:26 PM Pt resting in bed she is awake. Son is at bedside. Pt has no complains at this time. 8:22 PM Nurse from nursing home called back and said pt did get dose of eliquis this morning of 2.5 mg. Will give 5mg during visit. 8:27 PM Cardiac enzymes stable. UA normal. CT normal, will discharge pt back to nursing home. Pt has been stable during visit, blood pressure has been normal.     Provider Notes (Medical Decision Making):   Pt unable to provide story of what is going on. Pt is confused, has hx of dementia. Family reports she is usually confused and is at baseline. Pt denies pain anywhere. On exam patient has surgical incision of right hip due to recent fracture and partial replacement. Surgical wound is normal, no drainage, well approximated, no erythema. Lower leg is slightly swollen, Pt reported pain with Di Starch' exam. Pt pedal pulse palpable and bounding. Extremity is warm. Pt has strong .  No signs of stroke or MI not experiencing chest pain or shortness of breath, hypoxia to suggest PE.  Pt's vitals are stable, pt in no respiratory distress or hypoxic. Pt resting comfortably. Diagnosis     Clinical Impression:   1. Altered mental status, unspecified altered mental status type    2. Acute deep vein thrombosis (DVT) of right peroneal vein    3. Skin yeast infection        Disposition: Home    Follow-up Information     Follow up With Specialties Details Why Contact Info    Kush Noriega MD Geriatric Medicine In 1 day  [address]  [facility] [phone]      [id number] [facility] EMERGENCY DEPT Emergency Medicine  If symptoms worsen [address] [phone]           Patient's Medications   Start Taking    APIXABAN (ELIQUIS) 2.5 MG TABLET    Start taking 5mg BID x 1 week. Then decrease to 2.5mg BID. Indications: blood clot in a deep vein of the extremities    NYSTATIN (MYCOSTATIN) 100,000 UNIT/GRAM OINTMENT    Apply  to affected area two (2) times a day. Continue Taking    ACETAMINOPHEN (TYLENOL) 325 MG TABLET    Take  by mouth every four (4) hours as needed for Pain. ATENOLOL (TENORMIN) 50 MG TABLET    Take 50 mg by mouth. BIMATOPROST (LUMIGAN) 0.01 % OPHTHALMIC DROPS    Administer 1 Drop to both eyes every evening. FERROUS SULFATE 300 MG (60 MG IRON)/5 ML SYRUP    Take 5 mL by mouth daily (with breakfast). LISINOPRIL (PRINIVIL, ZESTRIL) 20 MG TABLET    Take 20 mg by mouth daily. MEMANTINE (NAMENDA) 5 MG TABLET    Take 10 mg by mouth. OXYCODONE IR (ROXICODONE) 5 MG IMMEDIATE RELEASE TABLET    Take 2.5 mg by mouth. SIMVASTATIN (ZOCOR) 20 MG TABLET    Take 20 mg by mouth nightly. Indications: DYSLIPIDEMIA    TRAMADOL (ULTRAM) 50 MG TABLET    Take 1 Tab by mouth every six (6) hours as needed for Pain. Max Daily Amount: 200 mg. TRAMADOL (ULTRAM) 50 MG TABLET    Take 1 Tab by mouth every six (6) hours as needed for Pain. Max Daily Amount: 200 mg.    These Medications have changed    No medications on file   Stop Taking    AMOXICILLIN-CLAVULANATE (AUGMENTIN) 250-62.5 MG/5 ML SUSPENSION    give 10 milliliter by mouth every 12 hours for 10 days    APIXABAN (ELIQUIS) 2.5 MG TABLET    Take 2.5 mg by mouth two (2) times a day. BRIMONIDINE-TIMOLOL (COMBIGAN) 0.2-0.5 % DROP OPHTHALMIC SOLUTION    1 Drop every twelve (12) hours. Dictation disclaimer:  Please note that this dictation was completed with [employer], the computer voice recognition software. Quite often unanticipated grammatical, syntax, homophones, and other interpretive errors are inadvertently transcribed by the computer software. Please disregard these errors. Please excuse any errors that have escaped final proofreading.

## 2020-01-08 NOTE — ED NOTES
Iv removed from left outer wrist 20g INT, unable to flush, family states may have been placed on Saturday at the rehab facility, iv cath intact, 2x2 and tape placed, no active bleeding

## 2020-01-08 NOTE — ED TRIAGE NOTES
Patient at rehab for recent left femur fracture. PT had trouble waking her up today, so called 911.  Patient's only complaint is being \"sleepy\"

## 2020-01-08 NOTE — DISCHARGE INSTRUCTIONS
Patient Education     Altered Mental Status: Care Instructions  Your Care Instructions  Altered mental status is a change in how well your brain is working. As a result, you may be confused, be less alert than usual, or act in odd ways. This may include seeing or hearing things that aren't really there (hallucinations). A mental status change has many possible causes. For example, it may be the result of an infection, an imbalance of chemicals in the body, or a chronic disease such as diabetes or COPD. It can also be caused by things such as a head injury, taking certain medicines, or using alcohol or drugs. The doctor may do tests to look for the cause. These tests may include urine tests, blood tests, and imaging tests such as a CT scan. Sometimes a clear cause isn't found. But tests can help the doctor rule out a serious cause of your symptoms. A change in mental status can be scary. But mental status will often return to normal when the cause is treated. So it is important to get any follow-up testing or treatment the doctor has suggested. The doctor has checked you carefully, but problems can develop later. If you notice any problems or new symptoms, get medical treatment right away. Follow-up care is a key part of your treatment and safety. Be sure to make and go to all appointments, and call your doctor if you are having problems. It's also a good idea to know your test results and keep a list of the medicines you take. How can you care for yourself at home? · Be safe with medicines. Take your medicines exactly as prescribed. Call your doctor if you think you are having a problem with your medicine. · Have another adult stay with you until you are better. This can help keep you safe. Ask that person to watch for signs that your mental status is getting worse. When should you call for help? Call 911 anytime you think you may need emergency care.  For example, call if:  · You passed out (lost consciousness). Call your doctor now or seek immediate medical care if:  · Your mental status is getting worse. · You have new symptoms, such as a fever, chills, or shortness of breath. · You do not feel safe. Watch closely for changes in your health, and be sure to contact your doctor if:  · You do not get better as expected. Where can you learn more? Go to [employer]  Enter J452 in the search box to learn more about \"Altered Mental Status: Care Instructions. \"   © 0214-6938 [employer]wise, [employer]. Care instructions adapted under license by [employer] (which disclaims liability or warranty for this information). This care instruction is for use with your licensed healthcare professional. If you have questions about a medical condition or this instruction, always ask your healthcare professional. [address] any warranty or liability for your use of this information. Content Version: 28.0.285376; Current as of: November 20, 2015         DISCONTINUE COUMADIN. Start Eliquis 5mg BID x 1 week, then decrease to 2.5mg eliquis BID for maintenance.

## 2020-01-09 LAB
ATRIAL RATE: 58 BPM
CALCULATED P AXIS, ECG09: 3 DEGREES
CALCULATED T AXIS, ECG11: 6 DEGREES
DIAGNOSIS, 93000: NORMAL
P-R INTERVAL, ECG05: 194 MS
Q-T INTERVAL, ECG07: 464 MS
QRS DURATION, ECG06: 86 MS
QTC CALCULATION (BEZET), ECG08: 455 MS
VENTRICULAR RATE, ECG03: 58 BPM

## 2020-01-09 NOTE — ED NOTES
Report called to [facility] spoke with Kelly Crum  Pt transferred onto [address] [place] [address], pt alert talkative, in great mood, states now she is hungry

## 2020-01-09 NOTE — ED NOTES
pericare performed, adult brief and inserted pad saturated with urine, some small smear of stool, skin is very red, swollen, inflamed, rash, excoriated spreading to inner thighs, entire perineal area, labia; straight cath performed, new adult brief placed and pure wick to wall suction; pt repeatedly stated she would rather stay \"dirty\" and just have her life end now instead of being cleaned up;   Large purple/black bruise noted to area below the right hip dressing  Right foot drop, pt able to wiggle toes but very minimal from the ankle  Family x 3 to bedside, updated on rash and right foot; they report pt does give staff a hard time when they try to clean her up

## 2020-01-09 NOTE — ED NOTES
Patient armband removed and shreddedI have reviewed discharge instructions with the caregiver. The caregiver verbalized understanding. Report called to [facility], pt transported by [facility].  rx x 2 given to transport

## 2020-01-10 LAB
BACTERIA SPEC CULT: NORMAL
SERVICE CMNT-IMP: NORMAL

## 2020-02-05 ENCOUNTER — HOSPITAL ENCOUNTER (OUTPATIENT)
Dept: LAB | Age: 85
Discharge: HOME | End: 2020-02-05

## 2020-02-05 LAB
ANION GAP SERPL CALC-SCNC: 5 MMOL/L (ref 3–18)
BUN SERPL-MCNC: 28 MG/DL (ref 7–18)
BUN/CREAT SERPL: 33 (ref 12–20)
CALCIUM SERPL-MCNC: 9.5 MG/DL (ref 8.5–10.1)
CHLORIDE SERPL-SCNC: 112 MMOL/L (ref 100–111)
CO2 SERPL-SCNC: 29 MMOL/L (ref 21–32)
CREAT SERPL-MCNC: 0.86 MG/DL (ref 0.6–1.3)
ERYTHROCYTE [DISTWIDTH] IN BLOOD BY AUTOMATED COUNT: 15.5 % (ref 11.6–14.5)
GLUCOSE SERPL-MCNC: 93 MG/DL (ref 74–99)
HCT VFR BLD AUTO: 36 % (ref 35–45)
HGB BLD-MCNC: 11.3 G/DL (ref 12–16)
MCH RBC QN AUTO: 29 PG (ref 24–34)
MCHC RBC AUTO-ENTMCNC: 31.4 G/DL (ref 31–37)
MCV RBC AUTO: 92.5 FL (ref 74–97)
PLATELET # BLD AUTO: 283 K/UL (ref 135–420)
PMV BLD AUTO: 11.7 FL (ref 9.2–11.8)
POTASSIUM SERPL-SCNC: 4.2 MMOL/L (ref 3.5–5.5)
RBC # BLD AUTO: 3.89 M/UL (ref 4.2–5.3)
SODIUM SERPL-SCNC: 146 MMOL/L (ref 136–145)
WBC # BLD AUTO: 7.4 K/UL (ref 4.6–13.2)

## 2020-02-05 PROCEDURE — 80048 BASIC METABOLIC PNL TOTAL CA: CPT

## 2020-02-05 PROCEDURE — 85027 COMPLETE CBC AUTOMATED: CPT

## 2020-02-20 ENCOUNTER — HOSPITAL ENCOUNTER (OUTPATIENT)
Dept: LAB | Age: 85
Discharge: HOME | End: 2020-02-20
Payer: MEDICARE

## 2020-02-20 LAB
ANION GAP SERPL CALC-SCNC: 8 MMOL/L (ref 3–18)
APPEARANCE UR: ABNORMAL
BACTERIA URNS QL MICRO: ABNORMAL /HPF
BASOPHILS # BLD: 0 K/UL (ref 0–0.1)
BASOPHILS NFR BLD: 0 % (ref 0–2)
BILIRUB UR QL: NEGATIVE
BUN SERPL-MCNC: 45 MG/DL (ref 7–18)
BUN/CREAT SERPL: 25 (ref 12–20)
CALCIUM SERPL-MCNC: 9.7 MG/DL (ref 8.5–10.1)
CHLORIDE SERPL-SCNC: 111 MMOL/L (ref 100–111)
CO2 SERPL-SCNC: 26 MMOL/L (ref 21–32)
COLOR UR: ABNORMAL
CREAT SERPL-MCNC: 1.77 MG/DL (ref 0.6–1.3)
DIFFERENTIAL METHOD BLD: ABNORMAL
EOSINOPHIL # BLD: 0.1 K/UL (ref 0–0.4)
EOSINOPHIL NFR BLD: 1 % (ref 0–5)
EPITH CASTS URNS QL MICRO: ABNORMAL /LPF (ref 0–5)
ERYTHROCYTE [DISTWIDTH] IN BLOOD BY AUTOMATED COUNT: 15.7 % (ref 11.6–14.5)
GLUCOSE SERPL-MCNC: 141 MG/DL (ref 74–99)
GLUCOSE UR STRIP.AUTO-MCNC: NEGATIVE MG/DL
HCT VFR BLD AUTO: 43.2 % (ref 35–45)
HGB BLD-MCNC: 13.5 G/DL (ref 12–16)
HGB UR QL STRIP: NEGATIVE
KETONES UR QL STRIP.AUTO: ABNORMAL MG/DL
LEUKOCYTE ESTERASE UR QL STRIP.AUTO: ABNORMAL
LYMPHOCYTES # BLD: 1.6 K/UL (ref 0.9–3.6)
LYMPHOCYTES NFR BLD: 16 % (ref 21–52)
MCH RBC QN AUTO: 28.6 PG (ref 24–34)
MCHC RBC AUTO-ENTMCNC: 31.3 G/DL (ref 31–37)
MCV RBC AUTO: 91.5 FL (ref 74–97)
MONOCYTES # BLD: 0.9 K/UL (ref 0.05–1.2)
MONOCYTES NFR BLD: 9 % (ref 3–10)
NEUTS SEG # BLD: 7.7 K/UL (ref 1.8–8)
NEUTS SEG NFR BLD: 74 % (ref 40–73)
NITRITE UR QL STRIP.AUTO: NEGATIVE
PH UR STRIP: 5 [PH] (ref 5–8)
PLATELET # BLD AUTO: 318 K/UL (ref 135–420)
PMV BLD AUTO: 13.1 FL (ref 9.2–11.8)
POTASSIUM SERPL-SCNC: 4.1 MMOL/L (ref 3.5–5.5)
PROT UR STRIP-MCNC: 30 MG/DL
RBC # BLD AUTO: 4.72 M/UL (ref 4.2–5.3)
RBC #/AREA URNS HPF: NEGATIVE /HPF (ref 0–5)
SODIUM SERPL-SCNC: 145 MMOL/L (ref 136–145)
SP GR UR REFRACTOMETRY: 1.02 (ref 1–1.03)
UROBILINOGEN UR QL STRIP.AUTO: 1 EU/DL (ref 0.2–1)
WBC # BLD AUTO: 10.3 K/UL (ref 4.6–13.2)
WBC URNS QL MICRO: >100 /HPF (ref 0–4)

## 2020-02-20 PROCEDURE — 80048 BASIC METABOLIC PNL TOTAL CA: CPT

## 2020-02-20 PROCEDURE — 87086 URINE CULTURE/COLONY COUNT: CPT

## 2020-02-20 PROCEDURE — 81001 URINALYSIS AUTO W/SCOPE: CPT

## 2020-02-20 PROCEDURE — 85025 COMPLETE CBC W/AUTO DIFF WBC: CPT

## 2020-02-20 PROCEDURE — 87186 SC STD MICRODIL/AGAR DIL: CPT

## 2020-02-20 PROCEDURE — 36415 COLL VENOUS BLD VENIPUNCTURE: CPT

## 2020-02-20 PROCEDURE — 87077 CULTURE AEROBIC IDENTIFY: CPT

## 2020-02-23 LAB
BACTERIA SPEC CULT: ABNORMAL
BACTERIA SPEC CULT: ABNORMAL
SERVICE CMNT-IMP: ABNORMAL

## 2020-02-27 ENCOUNTER — APPOINTMENT (OUTPATIENT)
Dept: GENERAL RADIOLOGY | Age: 85
DRG: 872 | End: 2020-02-27
Attending: EMERGENCY MEDICINE
Payer: MEDICARE

## 2020-02-27 ENCOUNTER — HOSPITAL ENCOUNTER (OUTPATIENT)
Dept: LAB | Age: 85
Discharge: HOME | DRG: 872 | End: 2020-02-27
Payer: MEDICARE

## 2020-02-27 ENCOUNTER — HOSPITAL ENCOUNTER (INPATIENT)
Age: 85
LOS: 4 days | Discharge: NURSING, REHAB OR LONG-TERM CARE | DRG: 872 | End: 2020-03-02
Attending: EMERGENCY MEDICINE | Admitting: HOSPITALIST
Payer: MEDICARE

## 2020-02-27 ENCOUNTER — APPOINTMENT (OUTPATIENT)
Dept: CT IMAGING | Age: 85
DRG: 872 | End: 2020-02-27
Attending: EMERGENCY MEDICINE
Payer: MEDICARE

## 2020-02-27 DIAGNOSIS — N39.0 URINARY TRACT INFECTION WITHOUT HEMATURIA, SITE UNSPECIFIED: Primary | ICD-10-CM

## 2020-02-27 DIAGNOSIS — S42.032A DISPLACED FRACTURE OF LATERAL END OF LEFT CLAVICLE, INITIAL ENCOUNTER FOR CLOSED FRACTURE: ICD-10-CM

## 2020-02-27 DIAGNOSIS — R11.2 NAUSEA AND VOMITING, INTRACTABILITY OF VOMITING NOT SPECIFIED, UNSPECIFIED VOMITING TYPE: ICD-10-CM

## 2020-02-27 DIAGNOSIS — N17.9 AKI (ACUTE KIDNEY INJURY) (HCC): ICD-10-CM

## 2020-02-27 DIAGNOSIS — K56.41 FECAL IMPACTION (HCC): ICD-10-CM

## 2020-02-27 DIAGNOSIS — E86.0 DEHYDRATION: ICD-10-CM

## 2020-02-27 PROBLEM — R65.20 SEVERE SEPSIS (HCC): Status: ACTIVE | Noted: 2020-02-27

## 2020-02-27 PROBLEM — A41.9 SEVERE SEPSIS (HCC): Status: ACTIVE | Noted: 2020-02-27

## 2020-02-27 LAB
ALBUMIN SERPL-MCNC: 2.8 G/DL (ref 3.4–5)
ALBUMIN SERPL-MCNC: 3.4 G/DL (ref 3.4–5)
ALBUMIN/GLOB SERPL: 0.7 {RATIO} (ref 0.8–1.7)
ALBUMIN/GLOB SERPL: 0.7 {RATIO} (ref 0.8–1.7)
ALP SERPL-CCNC: 89 U/L (ref 45–117)
ALP SERPL-CCNC: 97 U/L (ref 45–117)
ALT SERPL-CCNC: 18 U/L (ref 13–56)
ALT SERPL-CCNC: 21 U/L (ref 13–56)
AMORPH CRY URNS QL MICRO: ABNORMAL
ANION GAP SERPL CALC-SCNC: 13 MMOL/L (ref 3–18)
ANION GAP SERPL CALC-SCNC: 16 MMOL/L (ref 3–18)
APPEARANCE UR: ABNORMAL
AST SERPL-CCNC: 22 U/L (ref 10–38)
AST SERPL-CCNC: 25 U/L (ref 10–38)
BACTERIA URNS QL MICRO: ABNORMAL /HPF
BASOPHILS # BLD: 0 K/UL (ref 0–0.06)
BASOPHILS NFR BLD: 0 % (ref 0–3)
BILIRUB SERPL-MCNC: 0.6 MG/DL (ref 0.2–1)
BILIRUB SERPL-MCNC: 0.7 MG/DL (ref 0.2–1)
BILIRUB UR QL: ABNORMAL
BUN SERPL-MCNC: 70 MG/DL (ref 7–18)
BUN SERPL-MCNC: 81 MG/DL (ref 7–18)
BUN/CREAT SERPL: 30 (ref 12–20)
BUN/CREAT SERPL: 34 (ref 12–20)
C DIFF GDH STL QL: NEGATIVE
C DIFF TOX A+B STL QL IA: NEGATIVE
CALCIUM SERPL-MCNC: 10.5 MG/DL (ref 8.5–10.1)
CALCIUM SERPL-MCNC: 9.6 MG/DL (ref 8.5–10.1)
CHLORIDE SERPL-SCNC: 107 MMOL/L (ref 100–111)
CHLORIDE SERPL-SCNC: 111 MMOL/L (ref 100–111)
CO2 SERPL-SCNC: 19 MMOL/L (ref 21–32)
CO2 SERPL-SCNC: 22 MMOL/L (ref 21–32)
COLOR UR: ABNORMAL
CREAT SERPL-MCNC: 2.3 MG/DL (ref 0.6–1.3)
CREAT SERPL-MCNC: 2.35 MG/DL (ref 0.6–1.3)
DIFFERENTIAL METHOD BLD: ABNORMAL
EOSINOPHIL # BLD: 0 K/UL (ref 0–0.4)
EOSINOPHIL NFR BLD: 0 % (ref 0–5)
EPITH CASTS URNS QL MICRO: ABNORMAL /LPF (ref 0–5)
ERYTHROCYTE [DISTWIDTH] IN BLOOD BY AUTOMATED COUNT: 15.7 % (ref 11.6–14.5)
GLOBULIN SER CALC-MCNC: 4.1 G/DL (ref 2–4)
GLOBULIN SER CALC-MCNC: 4.8 G/DL (ref 2–4)
GLUCOSE SERPL-MCNC: 157 MG/DL (ref 74–99)
GLUCOSE SERPL-MCNC: 198 MG/DL (ref 74–99)
GLUCOSE UR STRIP.AUTO-MCNC: NEGATIVE MG/DL
HCT VFR BLD AUTO: 42.1 % (ref 35–45)
HEMOCCULT STL QL: NEGATIVE
HGB BLD-MCNC: 13.9 G/DL (ref 12–16)
HGB UR QL STRIP: NEGATIVE
INTERPRETATION: NORMAL
KETONES UR QL STRIP.AUTO: 15 MG/DL
LACTATE SERPL-SCNC: 2.4 MMOL/L (ref 0.4–2)
LACTATE SERPL-SCNC: 2.6 MMOL/L (ref 0.4–2)
LEUKOCYTE ESTERASE UR QL STRIP.AUTO: ABNORMAL
LIPASE SERPL-CCNC: 187 U/L (ref 73–393)
LIPASE SERPL-CCNC: 86 U/L (ref 73–393)
LYMPHOCYTES # BLD: 0.6 K/UL (ref 0.8–3.5)
LYMPHOCYTES NFR BLD: 3 % (ref 20–51)
MCH RBC QN AUTO: 29.1 PG (ref 24–34)
MCHC RBC AUTO-ENTMCNC: 33 G/DL (ref 31–37)
MCV RBC AUTO: 88.3 FL (ref 74–97)
MONOCYTES # BLD: 0.8 K/UL (ref 0–1)
MONOCYTES NFR BLD: 4 % (ref 2–9)
NEUTS BAND NFR BLD MANUAL: 9 % (ref 0–5)
NEUTS SEG # BLD: 19.2 K/UL (ref 1.8–8)
NEUTS SEG NFR BLD: 84 % (ref 42–75)
NITRITE UR QL STRIP.AUTO: NEGATIVE
PH UR STRIP: 6 [PH] (ref 5–8)
PLATELET # BLD AUTO: 263 K/UL (ref 135–420)
PLATELET COMMENTS,PCOM: ABNORMAL
PMV BLD AUTO: 12.2 FL (ref 9.2–11.8)
POTASSIUM SERPL-SCNC: 3.2 MMOL/L (ref 3.5–5.5)
POTASSIUM SERPL-SCNC: 3.9 MMOL/L (ref 3.5–5.5)
PROT SERPL-MCNC: 6.9 G/DL (ref 6.4–8.2)
PROT SERPL-MCNC: 8.2 G/DL (ref 6.4–8.2)
PROT UR STRIP-MCNC: 30 MG/DL
RBC # BLD AUTO: 4.77 M/UL (ref 4.2–5.3)
RBC #/AREA URNS HPF: ABNORMAL /HPF (ref 0–5)
RBC MORPH BLD: ABNORMAL
SODIUM SERPL-SCNC: 142 MMOL/L (ref 136–145)
SODIUM SERPL-SCNC: 146 MMOL/L (ref 136–145)
SP GR UR REFRACTOMETRY: 1.02 (ref 1–1.03)
TROPONIN I SERPL-MCNC: 0.07 NG/ML (ref 0–0.04)
UROBILINOGEN UR QL STRIP.AUTO: 1 EU/DL (ref 0.2–1)
WBC # BLD AUTO: 20.6 K/UL (ref 4.6–13.2)
WBC URNS QL MICRO: ABNORMAL /HPF (ref 0–4)

## 2020-02-27 PROCEDURE — 74022 RADEX COMPL AQT ABD SERIES: CPT

## 2020-02-27 PROCEDURE — 99285 EMERGENCY DEPT VISIT HI MDM: CPT

## 2020-02-27 PROCEDURE — 84300 ASSAY OF URINE SODIUM: CPT

## 2020-02-27 PROCEDURE — 74011250636 HC RX REV CODE- 250/636: Performed by: EMERGENCY MEDICINE

## 2020-02-27 PROCEDURE — 83690 ASSAY OF LIPASE: CPT

## 2020-02-27 PROCEDURE — 65660000000 HC RM CCU STEPDOWN

## 2020-02-27 PROCEDURE — 0107U C DIFF TOX AG DETCJ IA STOOL: CPT

## 2020-02-27 PROCEDURE — 93005 ELECTROCARDIOGRAM TRACING: CPT

## 2020-02-27 PROCEDURE — 81001 URINALYSIS AUTO W/SCOPE: CPT

## 2020-02-27 PROCEDURE — 74011000250 HC RX REV CODE- 250: Performed by: EMERGENCY MEDICINE

## 2020-02-27 PROCEDURE — 84484 ASSAY OF TROPONIN QUANT: CPT

## 2020-02-27 PROCEDURE — 82570 ASSAY OF URINE CREATININE: CPT

## 2020-02-27 PROCEDURE — 87077 CULTURE AEROBIC IDENTIFY: CPT

## 2020-02-27 PROCEDURE — C9113 INJ PANTOPRAZOLE SODIUM, VIA: HCPCS | Performed by: EMERGENCY MEDICINE

## 2020-02-27 PROCEDURE — 74011250636 HC RX REV CODE- 250/636: Performed by: HOSPITALIST

## 2020-02-27 PROCEDURE — 74011250637 HC RX REV CODE- 250/637: Performed by: HOSPITALIST

## 2020-02-27 PROCEDURE — 96375 TX/PRO/DX INJ NEW DRUG ADDON: CPT

## 2020-02-27 PROCEDURE — 74176 CT ABD & PELVIS W/O CONTRAST: CPT

## 2020-02-27 PROCEDURE — 83605 ASSAY OF LACTIC ACID: CPT

## 2020-02-27 PROCEDURE — 96361 HYDRATE IV INFUSION ADD-ON: CPT

## 2020-02-27 PROCEDURE — 82272 OCCULT BLD FECES 1-3 TESTS: CPT

## 2020-02-27 PROCEDURE — 87040 BLOOD CULTURE FOR BACTERIA: CPT

## 2020-02-27 PROCEDURE — 80053 COMPREHEN METABOLIC PANEL: CPT

## 2020-02-27 PROCEDURE — 96365 THER/PROPH/DIAG IV INF INIT: CPT

## 2020-02-27 PROCEDURE — 85025 COMPLETE CBC W/AUTO DIFF WBC: CPT

## 2020-02-27 PROCEDURE — 87086 URINE CULTURE/COLONY COUNT: CPT

## 2020-02-27 PROCEDURE — 87186 SC STD MICRODIL/AGAR DIL: CPT

## 2020-02-27 RX ORDER — SODIUM CHLORIDE 9 MG/ML
125 INJECTION, SOLUTION INTRAVENOUS CONTINUOUS
Status: DISCONTINUED | OUTPATIENT
Start: 2020-02-27 | End: 2020-02-28

## 2020-02-27 RX ORDER — ONDANSETRON 2 MG/ML
4 INJECTION INTRAMUSCULAR; INTRAVENOUS
Status: DISCONTINUED | OUTPATIENT
Start: 2020-02-27 | End: 2020-03-03 | Stop reason: HOSPADM

## 2020-02-27 RX ORDER — SODIUM CHLORIDE 0.9 % (FLUSH) 0.9 %
5-10 SYRINGE (ML) INJECTION AS NEEDED
Status: DISCONTINUED | OUTPATIENT
Start: 2020-02-27 | End: 2020-03-03 | Stop reason: HOSPADM

## 2020-02-27 RX ORDER — ACETAMINOPHEN 325 MG/1
650 TABLET ORAL
Status: DISCONTINUED | OUTPATIENT
Start: 2020-02-27 | End: 2020-03-03 | Stop reason: HOSPADM

## 2020-02-27 RX ORDER — ONDANSETRON 2 MG/ML
4 INJECTION INTRAMUSCULAR; INTRAVENOUS
Status: COMPLETED | OUTPATIENT
Start: 2020-02-27 | End: 2020-02-27

## 2020-02-27 RX ORDER — PANTOPRAZOLE SODIUM 40 MG/10ML
40 INJECTION, POWDER, LYOPHILIZED, FOR SOLUTION INTRAVENOUS
Status: COMPLETED | OUTPATIENT
Start: 2020-02-27 | End: 2020-02-27

## 2020-02-27 RX ORDER — SODIUM CHLORIDE 9 MG/ML
75 INJECTION, SOLUTION INTRAVENOUS CONTINUOUS
Status: DISCONTINUED | OUTPATIENT
Start: 2020-02-27 | End: 2020-02-28

## 2020-02-27 RX ADMIN — SODIUM CHLORIDE 500 ML: 900 INJECTION, SOLUTION INTRAVENOUS at 17:41

## 2020-02-27 RX ADMIN — SODIUM CHLORIDE 1350 ML: 900 INJECTION, SOLUTION INTRAVENOUS at 16:02

## 2020-02-27 RX ADMIN — CEFEPIME HYDROCHLORIDE 2 G: 2 INJECTION, POWDER, FOR SOLUTION INTRAVENOUS at 17:39

## 2020-02-27 RX ADMIN — PANTOPRAZOLE SODIUM 40 MG: 40 INJECTION, POWDER, FOR SOLUTION INTRAVENOUS at 15:54

## 2020-02-27 RX ADMIN — ACETAMINOPHEN 650 MG: 325 TABLET ORAL at 20:08

## 2020-02-27 RX ADMIN — SODIUM CHLORIDE 75 ML/HR: 900 INJECTION, SOLUTION INTRAVENOUS at 20:53

## 2020-02-27 RX ADMIN — ONDANSETRON 4 MG: 2 INJECTION INTRAMUSCULAR; INTRAVENOUS at 15:51

## 2020-02-27 NOTE — ED PROVIDER NOTES
EMERGENCY DEPARTMENT HISTORY AND PHYSICAL EXAM    3:37 PM      Date: 2/27/2020  Patient Name: Clementina Macedo    History of Presenting Illness     Chief Complaint   Patient presents with    Nausea    Vomiting         History Provided By: Patient  Location/Duration/Severity/Modifying factors   Patient is a 54-year-old female with a history of dementia and recent right hip fracture now in a skilled nursing facility the presents emergency department with complaint of nausea and vomiting with dark stools that began today. Patient did have some diarrhea that began yesterday and was dark according the family and been doing some work-up at the nursing facility including an x-ray and giving her IV fluids. In addition the patient had a urinalysis discharged to potentially had a infection according to family. Patient today had a episode of dark emesis which concerned the nursing facility and sent her over for evaluation. Patient has no complaints she is hearing impaired with dementia according to family. Patient can only provide a limited history. Patient is not a smoker or drinker and has declined since she had her hip fracture. Patient is a DNR according to the family. There are no other known aggravating or alleviating factors. PCP: Stacia, MD Julio    Current Facility-Administered Medications   Medication Dose Route Frequency Provider Last Rate Last Dose    0.9% sodium chloride infusion  125 mL/hr IntraVENous CONTINUOUS Juan Francisco Hunter MD        sodium chloride (NS) flush 5-10 mL  5-10 mL IntraVENous PRN Mari Phelan MD        cefepime (MAXIPIME) 2 g in sterile water (preservative free) 10 mL IV syringe  2 g IntraVENous Q24H Mari Phelan MD   2 g at 02/27/20 7433     Current Outpatient Medications   Medication Sig Dispense Refill    oxyCODONE IR (ROXICODONE) 5 mg immediate release tablet Take 2.5 mg by mouth.  atenolol (TENORMIN) 50 mg tablet Take 50 mg by mouth.       lisinopril (PRINIVIL, ZESTRIL) 20 mg tablet Take 20 mg by mouth daily.  memantine (NAMENDA) 5 mg tablet Take 10 mg by mouth.  apixaban (ELIQUIS) 2.5 mg tablet Start taking 5mg BID x 1 week. Then decrease to 2.5mg BID. Indications: blood clot in a deep vein of the extremities 90 Tab 0    nystatin (MYCOSTATIN) 100,000 unit/gram ointment Apply  to affected area two (2) times a day. 15 g 0    traMADol (ULTRAM) 50 mg tablet Take 1 Tab by mouth every six (6) hours as needed for Pain. Max Daily Amount: 200 mg. 40 Tab 0    traMADol (ULTRAM) 50 mg tablet Take 1 Tab by mouth every six (6) hours as needed for Pain. Max Daily Amount: 200 mg. 16 Tab 0    acetaminophen (TYLENOL) 325 mg tablet Take  by mouth every four (4) hours as needed for Pain.  bimatoprost (LUMIGAN) 0.01 % ophthalmic drops Administer 1 Drop to both eyes every evening.  ferrous sulfate 300 mg (60 mg iron)/5 mL syrup Take 5 mL by mouth daily (with breakfast). 60 mL 0    simvastatin (ZOCOR) 20 mg tablet Take 20 mg by mouth nightly. Indications: DYSLIPIDEMIA         Past History     Past Medical History:  Past Medical History:   Diagnosis Date    Anticoagulated on Coumadin     Anticoagulation goal of INR 2 to 3     Decreased calculated glomerular filtration rate (GFR) 8/1/2016    Calculated GFR equivalent to that of CKD stage 3 = 30-59 ml/min    Dementia (HCC)     DVT (deep venous thrombosis) (HCC)     Dyslipidemia     Essential hypertension     Glaucoma     Osteoporosis     Postoperative anemia due to acute blood loss 7/29/2016       Past Surgical History:  No past surgical history on file. Family History:  Family History   Problem Relation Age of Onset    Heart Disease Mother     Diabetes Mother    AndreeVineet Arthritis-osteo Father     Cancer Brother        Social History:  Social History     Tobacco Use    Smoking status: Never Smoker    Smokeless tobacco: Never Used   Substance Use Topics    Alcohol use: No    Drug use:  No Allergies:  No Known Allergies      Review of Systems       Review of Systems   Constitutional: Negative for activity change, fatigue and fever. HENT: Negative for congestion and rhinorrhea. Eyes: Negative for visual disturbance. Respiratory: Negative for shortness of breath. Cardiovascular: Negative for chest pain and palpitations. Gastrointestinal: Positive for diarrhea and vomiting. Negative for abdominal pain and nausea. Genitourinary: Negative for dysuria and hematuria. Musculoskeletal: Negative for back pain. Skin: Negative for rash. Neurological: Negative for dizziness, weakness and light-headedness. Psychiatric/Behavioral: Positive for confusion. All other systems reviewed and are negative. Physical Exam     Visit Vitals  /56   Pulse 90   Temp 97.8 °F (36.6 °C)   Resp 22   Ht 5' (1.524 m)   LMP  (LMP Unknown)   SpO2 97%   BMI 29.29 kg/m²     Physical Exam  Vitals signs and nursing note reviewed. Constitutional:       General: She is not in acute distress. Appearance: She is well-developed. HENT:      Head: Normocephalic and atraumatic. Right Ear: External ear normal.      Left Ear: External ear normal.      Nose: Nose normal.   Eyes:      General: No scleral icterus. Conjunctiva/sclera: Conjunctivae normal.      Pupils: Pupils are equal, round, and reactive to light. Comments: Mild conjunctiva pallor  Dark substance on her lips after vomiting   Neck:      Musculoskeletal: Normal range of motion and neck supple. Thyroid: No thyromegaly. Vascular: No JVD. Trachea: No tracheal deviation. Cardiovascular:      Rate and Rhythm: Normal rate and regular rhythm. Heart sounds: Normal heart sounds. No murmur. No friction rub. No gallop. Pulmonary:      Effort: Pulmonary effort is normal.      Breath sounds: Normal breath sounds. Chest:      Chest wall: No tenderness.    Abdominal:      General: Bowel sounds are normal. There is no distension. Palpations: Abdomen is soft. Tenderness: There is abdominal tenderness. There is no guarding or rebound. Comments: Diffuse tenderness   Musculoskeletal:         General: No tenderness. Lymphadenopathy:      Cervical: No cervical adenopathy. Skin:     General: Skin is warm and dry. Neurological:      Mental Status: She is alert. Cranial Nerves: No cranial nerve deficit. Coordination: Coordination normal.      Comments: Oriented x1, follows simple commands, gait not observed,   Psychiatric:         Behavior: Behavior normal.         Thought Content: Thought content normal.         Judgment: Judgment normal.      Comments: Supportive family at the bedside including son. Diagnostic Study Results     Labs -  Recent Results (from the past 12 hour(s))   METABOLIC PANEL, COMPREHENSIVE    Collection Time: 02/27/20  2:30 PM   Result Value Ref Range    Sodium 146 (H) 136 - 145 mmol/L    Potassium 3.9 3.5 - 5.5 mmol/L    Chloride 111 100 - 111 mmol/L    CO2 22 21 - 32 mmol/L    Anion gap 13 3.0 - 18 mmol/L    Glucose 157 (H) 74 - 99 mg/dL    BUN 81 (H) 7.0 - 18 MG/DL    Creatinine 2.35 (H) 0.6 - 1.3 MG/DL    BUN/Creatinine ratio 34 (H) 12 - 20      GFR est AA 23 (L) >60 ml/min/1.73m2    GFR est non-AA 19 (L) >60 ml/min/1.73m2    Calcium 9.6 8.5 - 10.1 MG/DL    Bilirubin, total 0.6 0.2 - 1.0 MG/DL    ALT (SGPT) 18 13 - 56 U/L    AST (SGOT) 25 10 - 38 U/L    Alk.  phosphatase 89 45 - 117 U/L    Protein, total 6.9 6.4 - 8.2 g/dL    Albumin 2.8 (L) 3.4 - 5.0 g/dL    Globulin 4.1 (H) 2.0 - 4.0 g/dL    A-G Ratio 0.7 (L) 0.8 - 1.7     CBC WITH AUTOMATED DIFF    Collection Time: 02/27/20  2:30 PM   Result Value Ref Range    WBC 20.6 (H) 4.6 - 13.2 K/uL    RBC 4.77 4.20 - 5.30 M/uL    HGB 13.9 12.0 - 16.0 g/dL    HCT 42.1 35.0 - 45.0 %    MCV 88.3 74.0 - 97.0 FL    MCH 29.1 24.0 - 34.0 PG    MCHC 33.0 31.0 - 37.0 g/dL    RDW 15.7 (H) 11.6 - 14.5 %    PLATELET 483 120 - 326 K/uL MPV 12.2 (H) 9.2 - 11.8 FL    NEUTROPHILS 84 (H) 42 - 75 %    BAND NEUTROPHILS 9 (H) 0 - 5 %    LYMPHOCYTES 3 (L) 20 - 51 %    MONOCYTES 4 2 - 9 %    EOSINOPHILS 0 0 - 5 %    BASOPHILS 0 0 - 3 %    ABS. NEUTROPHILS 19.2 (H) 1.8 - 8.0 K/UL    ABS. LYMPHOCYTES 0.6 (L) 0.8 - 3.5 K/UL    ABS. MONOCYTES 0.8 0 - 1.0 K/UL    ABS. EOSINOPHILS 0.0 0.0 - 0.4 K/UL    ABS.  BASOPHILS 0.0 0.0 - 0.06 K/UL    DF MANUAL      PLATELET COMMENTS ADEQUATE PLATELETS      RBC COMMENTS ANISOCYTOSIS  1+       LIPASE    Collection Time: 02/27/20  2:30 PM   Result Value Ref Range    Lipase 86 73 - 393 U/L   TROPONIN I    Collection Time: 02/27/20  2:30 PM   Result Value Ref Range    Troponin-I, QT 0.07 (H) 0.0 - 0.045 NG/ML   LACTIC ACID    Collection Time: 02/27/20  3:58 PM   Result Value Ref Range    Lactic acid 2.4 (HH) 0.4 - 2.0 MMOL/L   EKG, 12 LEAD, INITIAL    Collection Time: 02/27/20  4:10 PM   Result Value Ref Range    Ventricular Rate 72 BPM    Atrial Rate 72 BPM    P-R Interval 142 ms    QRS Duration 82 ms    Q-T Interval 460 ms    QTC Calculation (Bezet) 503 ms    Calculated P Axis 48 degrees    Calculated R Axis -2 degrees    Calculated T Axis 30 degrees    Diagnosis       Sinus rhythm with premature supraventricular complexes and with frequent   premature ventricular complexes  Nonspecific ST and T wave abnormality  Abnormal ECG  When compared with ECG of 08-JAN-2020 16:07,  premature ventricular complexes are now present  premature supraventricular complexes are now present  T wave inversion more evident in Anterior leads     URINALYSIS W/ RFLX MICROSCOPIC    Collection Time: 02/27/20  5:20 PM   Result Value Ref Range    Color DARK YELLOW      Appearance TURBID      Specific gravity 1.018 1.005 - 1.030      pH (UA) 6.0 5.0 - 8.0      Protein 30 (A) NEG mg/dL    Glucose NEGATIVE  NEG mg/dL    Ketone 15 (A) NEG mg/dL    Bilirubin SMALL (A) NEG      Blood NEGATIVE  NEG      Urobilinogen 1.0 0.2 - 1.0 EU/dL    Nitrites NEGATIVE NEG      Leukocyte Esterase MODERATE (A) NEG     URINE MICROSCOPIC ONLY    Collection Time: 02/27/20  5:20 PM   Result Value Ref Range    WBC 5 to 9 0 - 4 /hpf    RBC NONE 0 - 5 /hpf    Epithelial cells FEW 0 - 5 /lpf    Bacteria 4+ (A) NEG /hpf    Amorphous Crystals 4+ (A) NEG   LACTIC ACID    Collection Time: 02/27/20  5:29 PM   Result Value Ref Range    Lactic acid 2.6 (HH) 0.4 - 2.0 MMOL/L   OCCULT BLOOD, STOOL    Collection Time: 02/27/20  6:00 PM   Result Value Ref Range    Occult blood, stool NEGATIVE  NEG         Radiologic Studies -   XR ABD ACUTE W 1 V CHEST   Final Result   IMPRESSION:   1. No evidence of bowel obstruction or perforation. No acute findings   identified. CT ABD PELV WO CONT    (Results Pending)         Medical Decision Making   I am the first provider for this patient. I reviewed the vital signs, available nursing notes, past medical history, past surgical history, family history and social history. Vital Signs-Reviewed the patient's vital signs. EKG: Sinus rhythm at 72, PVC, no STEMI    Records Reviewed: Old Medical Records and Previous electrocardiograms (Time of Review: 3:37 PM)    ED Course: Progress Notes, Reevaluation, and Consults:     I went to do the rectal exam for the patient and found a large stool ball that after discussing with the family I disimpacted. I suspect she has a component of constipation and potential urinary tract infection. We will continue to hydrate give a soapsuds enema and reevaluate. Migdalia Chun, DO 6:09 PM    Patient is a rising lactate and is Hemoccult negative. Suspect patient has severe sepsis from UTI this appears to be partially treated according to family saying she is getting antibiotics at facility. We will continue IV hydration as she has acute kidney injury and hypernatremia and admit to a telemetry floor. Discussed the case with Dr. Mary Lou Quevedo and will admit. I performed a sepsis reevaluation. Migdalia Chun, DO 6:53 PM      Provider Notes (Medical Decision Making):   MDM  Number of Diagnoses or Management Options  Diagnosis management comments: Patient is a 75-year-old female with a history of recent fracture of the right femoral neck, debility, dementia, anemia, dyslipidemia, hypertension, DVT, on NOAC therapy that presents to the emergency department with a complaint of nausea vomiting and diarrhea with some dark vomitus that is concerning to the facility. Patient is on anticoagulation so will give a dose of Protonix, Hemoccult, trend her labs, abdominal x-ray, chest x-ray, IV fluids, urinalysis, lactic acid, blood cultures, and then reevaluate. Lashaun Gold DO 3:48 PM        Procedures    Critical Care Time: Critical Care Time:  The services I provided to this patient were to treat and/or prevent clinically significant deterioration that could result in the failure of one or more body systems and/or organ systems due to sepsis and lactic acidosis. Services included the following:  -reviewing nursing notes and old charts  -vital sign assessments  -direct patient care  -medication orders and management  -interpreting and reviewing diagnostic studies/labs  -re-evaluations  -documentation time    Aggregate critical care time was 40 minutes, which includes only time during which I was engaged in work directly related to the patient's care as described above, whether I was at bedside or elsewhere in the Emergency Department. It did not include time spent performing other reported procedures or the services of residents, students, nurses, or advance practice providers. Lashaun Gold DO 6:53 PM        Diagnosis     Clinical Impression:   1. Urinary tract infection without hematuria, site unspecified    2. Dehydration    3. PATTI (acute kidney injury) ([facility] [address]    4. Fecal impaction ([facility] [address]    5.  Nausea and vomiting, intractability of vomiting not specified, unspecified vomiting type        Disposition:  Admit     Follow-up Information    None          Patient's Medications   Start Taking    No medications on file   Continue Taking    ACETAMINOPHEN (TYLENOL) 325 MG TABLET    Take  by mouth every four (4) hours as needed for Pain. APIXABAN (ELIQUIS) 2.5 MG TABLET    Start taking 5mg BID x 1 week. Then decrease to 2.5mg BID. Indications: blood clot in a deep vein of the extremities    ATENOLOL (TENORMIN) 50 MG TABLET    Take 50 mg by mouth. BIMATOPROST (LUMIGAN) 0.01 % OPHTHALMIC DROPS    Administer 1 Drop to both eyes every evening. FERROUS SULFATE 300 MG (60 MG IRON)/5 ML SYRUP    Take 5 mL by mouth daily (with breakfast). LISINOPRIL (PRINIVIL, ZESTRIL) 20 MG TABLET    Take 20 mg by mouth daily. MEMANTINE (NAMENDA) 5 MG TABLET    Take 10 mg by mouth. NYSTATIN (MYCOSTATIN) 100,000 UNIT/GRAM OINTMENT    Apply  to affected area two (2) times a day. OXYCODONE IR (ROXICODONE) 5 MG IMMEDIATE RELEASE TABLET    Take 2.5 mg by mouth. SIMVASTATIN (ZOCOR) 20 MG TABLET    Take 20 mg by mouth nightly. Indications: DYSLIPIDEMIA    TRAMADOL (ULTRAM) 50 MG TABLET    Take 1 Tab by mouth every six (6) hours as needed for Pain. Max Daily Amount: 200 mg. TRAMADOL (ULTRAM) 50 MG TABLET    Take 1 Tab by mouth every six (6) hours as needed for Pain. Max Daily Amount: 200 mg. These Medications have changed    No medications on file   Stop Taking    No medications on file     Disclaimer: Sections of this note are dictated using utilizing voice recognition software. Minor typographical errors may be present. If questions arise, please do not hesitate to contact me or call our department.

## 2020-02-27 NOTE — ED TRIAGE NOTES
Pt. To ed from nursing home. Staff at the nursing home reported to EMS that pt. Has been vomiting for the last two days. Per staff the emesis looked dark. Pt. Did have a fall yesterday. Per staff the vomiting started before the fall. Pt. Has hx of dementia. Pt. Is currently sleeping on the stretcher.

## 2020-02-28 LAB
ALBUMIN SERPL-MCNC: 2 G/DL (ref 3.4–5)
ALBUMIN SERPL-MCNC: 2.2 G/DL (ref 3.4–5)
ALBUMIN/GLOB SERPL: 0.7 {RATIO} (ref 0.8–1.7)
ALP SERPL-CCNC: 67 U/L (ref 45–117)
ALT SERPL-CCNC: 14 U/L (ref 13–56)
ANION GAP SERPL CALC-SCNC: 7 MMOL/L (ref 3–18)
ANION GAP SERPL CALC-SCNC: 8 MMOL/L (ref 3–18)
APTT PPP: 34.5 SEC (ref 23–36.4)
AST SERPL-CCNC: 22 U/L (ref 10–38)
ATRIAL RATE: 72 BPM
BILIRUB SERPL-MCNC: 0.4 MG/DL (ref 0.2–1)
BUN SERPL-MCNC: 60 MG/DL (ref 7–18)
BUN SERPL-MCNC: 74 MG/DL (ref 7–18)
BUN/CREAT SERPL: 41 (ref 12–20)
BUN/CREAT SERPL: 44 (ref 12–20)
CALCIUM SERPL-MCNC: 7.6 MG/DL (ref 8.5–10.1)
CALCIUM SERPL-MCNC: 7.8 MG/DL (ref 8.5–10.1)
CALCULATED P AXIS, ECG09: 48 DEGREES
CALCULATED R AXIS, ECG10: -2 DEGREES
CALCULATED T AXIS, ECG11: 30 DEGREES
CHLORIDE SERPL-SCNC: 121 MMOL/L (ref 100–111)
CHLORIDE SERPL-SCNC: 123 MMOL/L (ref 100–111)
CHOLEST SERPL-MCNC: 96 MG/DL
CO2 SERPL-SCNC: 20 MMOL/L (ref 21–32)
CO2 SERPL-SCNC: 20 MMOL/L (ref 21–32)
CREAT SERPL-MCNC: 1.36 MG/DL (ref 0.6–1.3)
CREAT SERPL-MCNC: 1.82 MG/DL (ref 0.6–1.3)
CREAT UR-MCNC: 84 MG/DL (ref 30–125)
DIAGNOSIS, 93000: NORMAL
ERYTHROCYTE [DISTWIDTH] IN BLOOD BY AUTOMATED COUNT: 16.2 % (ref 11.6–14.5)
GLOBULIN SER CALC-MCNC: 3.2 G/DL (ref 2–4)
GLUCOSE SERPL-MCNC: 83 MG/DL (ref 74–99)
GLUCOSE SERPL-MCNC: 88 MG/DL (ref 74–99)
HCT VFR BLD AUTO: 34.9 % (ref 35–45)
HDLC SERPL-MCNC: 33 MG/DL (ref 40–60)
HDLC SERPL: 2.9 {RATIO} (ref 0–5)
HGB BLD-MCNC: 11.2 G/DL (ref 12–16)
LDLC SERPL CALC-MCNC: 47.8 MG/DL (ref 0–100)
LIPID PROFILE,FLP: ABNORMAL
MCH RBC QN AUTO: 28.6 PG (ref 24–34)
MCHC RBC AUTO-ENTMCNC: 32.1 G/DL (ref 31–37)
MCV RBC AUTO: 89.3 FL (ref 74–97)
P-R INTERVAL, ECG05: 142 MS
PHOSPHATE SERPL-MCNC: 1.9 MG/DL (ref 2.5–4.9)
PLATELET # BLD AUTO: 210 K/UL (ref 135–420)
PMV BLD AUTO: 12.2 FL (ref 9.2–11.8)
POTASSIUM SERPL-SCNC: 3.5 MMOL/L (ref 3.5–5.5)
POTASSIUM SERPL-SCNC: 3.8 MMOL/L (ref 3.5–5.5)
PROT SERPL-MCNC: 5.4 G/DL (ref 6.4–8.2)
Q-T INTERVAL, ECG07: 460 MS
QRS DURATION, ECG06: 82 MS
QTC CALCULATION (BEZET), ECG08: 503 MS
RBC # BLD AUTO: 3.91 M/UL (ref 4.2–5.3)
SODIUM SERPL-SCNC: 149 MMOL/L (ref 136–145)
SODIUM SERPL-SCNC: 150 MMOL/L (ref 136–145)
SODIUM UR-SCNC: 56 MMOL/L (ref 20–110)
TRIGL SERPL-MCNC: 76 MG/DL (ref ?–150)
VENTRICULAR RATE, ECG03: 72 BPM
VLDLC SERPL CALC-MCNC: 15.2 MG/DL
WBC # BLD AUTO: 11.2 K/UL (ref 4.6–13.2)

## 2020-02-28 PROCEDURE — 77030037878 HC DRSG MEPILEX >48IN BORD MOLN -B

## 2020-02-28 PROCEDURE — 65660000000 HC RM CCU STEPDOWN

## 2020-02-28 PROCEDURE — 77030041247 HC PROTECTOR HEEL HEELMEDIX MDII -B

## 2020-02-28 PROCEDURE — 77030040392 HC DRSG OPTIFOAM MDII -A

## 2020-02-28 PROCEDURE — 92610 EVALUATE SWALLOWING FUNCTION: CPT

## 2020-02-28 PROCEDURE — 74011000258 HC RX REV CODE- 258: Performed by: INTERNAL MEDICINE

## 2020-02-28 PROCEDURE — 85027 COMPLETE CBC AUTOMATED: CPT

## 2020-02-28 PROCEDURE — 74011250636 HC RX REV CODE- 250/636: Performed by: HOSPITALIST

## 2020-02-28 PROCEDURE — 74011250636 HC RX REV CODE- 250/636: Performed by: INTERNAL MEDICINE

## 2020-02-28 PROCEDURE — 74011000250 HC RX REV CODE- 250: Performed by: HOSPITALIST

## 2020-02-28 PROCEDURE — 92526 ORAL FUNCTION THERAPY: CPT

## 2020-02-28 PROCEDURE — 77030038269 HC DRN EXT URIN PURWCK BARD -A

## 2020-02-28 PROCEDURE — 74011250637 HC RX REV CODE- 250/637: Performed by: HOSPITALIST

## 2020-02-28 PROCEDURE — 77030040393 HC DRSG OPTIFOAM GENT MDII -B

## 2020-02-28 PROCEDURE — 80069 RENAL FUNCTION PANEL: CPT

## 2020-02-28 PROCEDURE — 74011250637 HC RX REV CODE- 250/637: Performed by: INTERNAL MEDICINE

## 2020-02-28 PROCEDURE — 36415 COLL VENOUS BLD VENIPUNCTURE: CPT

## 2020-02-28 PROCEDURE — 80053 COMPREHEN METABOLIC PANEL: CPT

## 2020-02-28 PROCEDURE — 85730 THROMBOPLASTIN TIME PARTIAL: CPT

## 2020-02-28 PROCEDURE — 80061 LIPID PANEL: CPT

## 2020-02-28 RX ORDER — OXYCODONE HYDROCHLORIDE 5 MG/1
2.5 TABLET ORAL
Status: DISCONTINUED | OUTPATIENT
Start: 2020-02-28 | End: 2020-02-28

## 2020-02-28 RX ORDER — PANTOPRAZOLE SODIUM 40 MG/1
40 TABLET, DELAYED RELEASE ORAL
Status: DISCONTINUED | OUTPATIENT
Start: 2020-02-28 | End: 2020-03-03 | Stop reason: HOSPADM

## 2020-02-28 RX ORDER — FERROUS SULFATE 300 MG/5ML
300 LIQUID (ML) ORAL
Status: DISCONTINUED | OUTPATIENT
Start: 2020-02-28 | End: 2020-02-29

## 2020-02-28 RX ORDER — OXYCODONE HYDROCHLORIDE 5 MG/1
2.5 TABLET ORAL
Status: DISCONTINUED | OUTPATIENT
Start: 2020-02-28 | End: 2020-03-03 | Stop reason: HOSPADM

## 2020-02-28 RX ORDER — MEMANTINE HYDROCHLORIDE 10 MG/1
10 TABLET ORAL DAILY
Status: DISCONTINUED | OUTPATIENT
Start: 2020-02-28 | End: 2020-03-03 | Stop reason: HOSPADM

## 2020-02-28 RX ORDER — DEXTROSE MONOHYDRATE AND SODIUM CHLORIDE 5; .45 G/100ML; G/100ML
100 INJECTION, SOLUTION INTRAVENOUS CONTINUOUS
Status: DISCONTINUED | OUTPATIENT
Start: 2020-02-28 | End: 2020-02-28

## 2020-02-28 RX ORDER — LATANOPROST 50 UG/ML
1 SOLUTION/ DROPS OPHTHALMIC EVERY EVENING
Status: DISCONTINUED | OUTPATIENT
Start: 2020-02-28 | End: 2020-03-03 | Stop reason: HOSPADM

## 2020-02-28 RX ORDER — FACIAL-BODY WIPES
10 EACH TOPICAL DAILY PRN
Status: DISCONTINUED | OUTPATIENT
Start: 2020-02-28 | End: 2020-03-03 | Stop reason: HOSPADM

## 2020-02-28 RX ORDER — TRAMADOL HYDROCHLORIDE 50 MG/1
50 TABLET ORAL
Status: DISCONTINUED | OUTPATIENT
Start: 2020-02-28 | End: 2020-03-03 | Stop reason: HOSPADM

## 2020-02-28 RX ORDER — DEXTROSE MONOHYDRATE 50 MG/ML
75 INJECTION, SOLUTION INTRAVENOUS CONTINUOUS
Status: DISCONTINUED | OUTPATIENT
Start: 2020-02-28 | End: 2020-03-01

## 2020-02-28 RX ORDER — POLYETHYLENE GLYCOL 3350 17 G/17G
17 POWDER, FOR SOLUTION ORAL DAILY
Status: DISCONTINUED | OUTPATIENT
Start: 2020-02-29 | End: 2020-03-03 | Stop reason: HOSPADM

## 2020-02-28 RX ADMIN — MEMANTINE HYDROCHLORIDE 10 MG: 10 TABLET ORAL at 11:32

## 2020-02-28 RX ADMIN — CEFEPIME HYDROCHLORIDE 2 G: 2 INJECTION, POWDER, FOR SOLUTION INTRAVENOUS at 16:05

## 2020-02-28 RX ADMIN — LATANOPROST 1 DROP: 50 SOLUTION OPHTHALMIC at 19:53

## 2020-02-28 RX ADMIN — DEXTROSE MONOHYDRATE 100 ML/HR: 5 INJECTION, SOLUTION INTRAVENOUS at 21:00

## 2020-02-28 RX ADMIN — DEXTROSE MONOHYDRATE AND SODIUM CHLORIDE 100 ML/HR: 5; .45 INJECTION, SOLUTION INTRAVENOUS at 16:10

## 2020-02-28 RX ADMIN — PANTOPRAZOLE SODIUM 40 MG: 40 TABLET, DELAYED RELEASE ORAL at 16:21

## 2020-02-28 RX ADMIN — APIXABAN 2.5 MG: 2.5 TABLET, FILM COATED ORAL at 11:32

## 2020-02-28 RX ADMIN — SODIUM CHLORIDE 125 ML/HR: 900 INJECTION, SOLUTION INTRAVENOUS at 11:30

## 2020-02-28 RX ADMIN — APIXABAN 2.5 MG: 2.5 TABLET, FILM COATED ORAL at 19:53

## 2020-02-28 RX ADMIN — MINERAL SUPPLEMENT IRON 300 MG / 5 ML STRENGTH LIQUID 100 PER BOX UNFLAVORED 300 MG: at 11:32

## 2020-02-28 NOTE — CONSULTS
Surgery Consult      Patient: Mariusz Gregory MRN: [id number]  CSN: [id number]      YOB: 1926    Age: 80 y.o. Sex: female      DOA: 2/27/2020       HPI:     Mariusz Gregory is a 80 y.o. female who was admitted for UTI/leukocytosis/PATTI  She has had recent dvt and is also on eliquis  She had CT of the abdomen for the following indications: abdominal pain with nausea and vomiting for 2 days. Sustained a fall yesterday. Dark diarrhea. Abnormal urinalysis. There is incidental finding suggestive of a saccular abdominal aneurysm so vascular consult/evaluation is requested     It is noted pt has dementia and is a DNR  Fragile age of 80    Past Medical History:   Diagnosis Date    Anticoagulated on Coumadin     Anticoagulation goal of INR 2 to 3     Decreased calculated glomerular filtration rate (GFR) 8/1/2016    Calculated GFR equivalent to that of CKD stage 3 = 30-59 ml/min    Dementia (HCC)     DVT (deep venous thrombosis) (HCC)     Dyslipidemia     Essential hypertension     Glaucoma     Osteoporosis     Postoperative anemia due to acute blood loss 7/29/2016       No past surgical history on file. Family History   Problem Relation Age of Onset    Heart Disease Mother     Diabetes Mother     Arthritis-osteo Father     Cancer Brother        Social History     Socioeconomic History    Marital status:      Spouse name: Not on file    Number of children: Not on file    Years of education: Not on file    Highest education level: Not on file   Tobacco Use    Smoking status: Never Smoker    Smokeless tobacco: Never Used   Substance and Sexual Activity    Alcohol use: No    Drug use: No       Prior to Admission medications    Medication Sig Start Date End Date Taking? Authorizing Provider   oxyCODONE IR (ROXICODONE) 5 mg immediate release tablet Take 2.5 mg by mouth. 1/1/20   Other, MD Julio   atenolol (TENORMIN) 50 mg tablet Take 50 mg by mouth.     Julio Palomo MD   lisinopril (PRINIVIL, ZESTRIL) 20 mg tablet Take 20 mg by mouth daily. Julio Palomo MD   memantine (NAMENDA) 5 mg tablet Take 10 mg by mouth. Julio Palomo MD   apixaban (ELIQUIS) 2.5 mg tablet Start taking 5mg BID x 1 week. Then decrease to 2.5mg BID. Indications: blood clot in a deep vein of the extremities 1/9/20   Dorene Babinski, NP   nystatin (MYCOSTATIN) 100,000 unit/gram ointment Apply  to affected area two (2) times a day. 1/8/20   Dorene Babinski, NP   traMADol (ULTRAM) 50 mg tablet Take 1 Tab by mouth every six (6) hours as needed for Pain. Max Daily Amount: 200 mg. 2/8/19   Biuj Lopez MD   traMADol Wiliam Priestly) 50 mg tablet Take 1 Tab by mouth every six (6) hours as needed for Pain. Max Daily Amount: 200 mg. 1/31/19   Sanjuana Ruiz NP   acetaminophen (TYLENOL) 325 mg tablet Take  by mouth every four (4) hours as needed for Pain. Provider, Historical   bimatoprost (LUMIGAN) 0.01 % ophthalmic drops Administer 1 Drop to both eyes every evening. Provider, Historical   ferrous sulfate 300 mg (60 mg iron)/5 mL syrup Take 5 mL by mouth daily (with breakfast). 8/17/16   Jas Beal MD   simvastatin (ZOCOR) 20 mg tablet Take 20 mg by mouth nightly. Indications: DYSLIPIDEMIA    Julio Palomo MD       No Known Allergies    Review of Systems  Unable to obtain due to patient factors     Physical Exam:      Visit Vitals  /57 (BP 1 Location: Left arm, BP Patient Position: At rest)   Pulse 80   Temp 97.5 °F (36.4 °C)   Resp 18   Ht 5' (1.524 m)   Wt 142 lb 12.8 oz (64.8 kg)   SpO2 100%   BMI 27.89 kg/m²       Physical Exam:  Patient sleeping. Did not examine at this time.  Spoke with hospitalist, family member present and family member by phone    Data Review:    CBC:   Lab Results   Component Value Date/Time    WBC 11.2 02/28/2020 03:09 AM    RBC 3.91 (L) 02/28/2020 03:09 AM    HGB 11.2 (L) 02/28/2020 03:09 AM    HCT 34.9 (L) 02/28/2020 03:09 AM    PLATELET 510 69/27/2768 03:09 AM      BMP:   Lab Results   Component Value Date/Time    Glucose 83 02/28/2020 03:09 AM    Sodium 149 (H) 02/28/2020 03:09 AM    Potassium 3.8 02/28/2020 03:09 AM    Chloride 121 (H) 02/28/2020 03:09 AM    CO2 20 (L) 02/28/2020 03:09 AM    BUN 74 (H) 02/28/2020 03:09 AM    Creatinine 1.82 (H) 02/28/2020 03:09 AM    Calcium 7.8 (L) 02/28/2020 03:09 AM     Coagulation:   Lab Results   Component Value Date/Time    Prothrombin time 31.9 (H) 01/08/2020 04:40 PM    INR 3.1 (H) 01/08/2020 04:40 PM    aPTT 34.5 02/28/2020 03:09 AM       CT  Moderate to pronounced atheromatous plaque calcifications. There is  suggestion of a saccular aneurysm in the infrarenal abdominal aorta, with  apparent partially calcified intimal flap protruding into the lumen (axial #29). Because of the this structure being visualized only on 2 or 3 consecutive  slices, aortic dissection is felt to be unlikely but cannot be entirely  excluded. At the level of this probable saccular aneurysm, the aorta measures  about 2.4 x 2.3 cm in diameter    Assessment/Plan     Probable saccular aneurysm - will review images with attending. [place] imaging would be CTA but with PATTI would not use contrast at this time. Her renal function though is improving so if trend back towards normal soon can consider CTA. Based on those results we can make appropriate recommendations regarding any treatment options.  Spoke with POA on phone with this information    Active Problems:    UTI (urinary tract infection) (2/27/2020)      Severe sepsis ([address] (2/27/2020)      ARF (acute renal failure) ([address] (2/27/2020)        VICKI Toro  February 28, 2020

## 2020-02-28 NOTE — WOUND CARE
Spoke with bedside nurse this am. Podiatry consult needed for tissue injury assessment for possible debridement and/or treatment. Understanding voiced. Wound care consult not appropriate at this time.

## 2020-02-28 NOTE — PROGRESS NOTES
Hospitalist Progress Note    Patient: Johana Mejia Age: 80 y.o. : 1926 MR#: [id number] SSN: [id number]  Date/Time: 2020 9:02 AM    DOA: 2020  PCP: Julio Palomo MD    Subjective:     Family member at bedside, concern for her cough with drinking water  Currently, she still has abdominal pain, no nausea/vomiting. CT abd/pelv with ?esophagitis, ? possible saccular aneurysm ?dissection   Renal function slightly improved. Still on IV antibiotic for UTI. Leukocytosis resolved      Wound care concern for right heal ulceration POA, ?pressure ulcer     ROS: no fever/chills, no headache, no dizziness, no facial pain, no sinus congestion, +cough  No swallowing pain, No chest pain, no palpitation, no shortness of breath, + abd pain,  No diarrhea, no urinary complaint, no leg pain or swelling      Assessment/Plan:   1. Sepsis POA (leukocytosis, tachypnea, bandemia, UTI)  2. GNR UTI   3. Leukocytosis, bandemia, resolved   4. PATTI likely associated with sepsis vs hypovolemia   5. Metabolic acidosis with PATTI, with lactic acidosis associate with sepsis  6. Hypernatremia,   7. Hypokalemia   8. Indeterminate troponin elevated, likely in setting of PATTI, non cardiac   9. Distal esophagus thickening, reflux esophagitis   10. Possible saccular aneurysm infrarenal abdominal aorta vs dissection (chronic)  11. Right heal ulceration, pressure ulcer POA   12. DVT on Eliquis   13. Hypertension, stable   14. Dementia   15. Dyslipidemia   16. Cough with swallowing, ?dysphagia? 17.  Iron deficiency anemia     Continue IV antibiotic, urine culture and blood culture follow up. Monitor CBC, BMP daily. Start on D5w-.45ns, avoid nephrotoxic medications. Consulted Nephrology   Add troponin on lab this AM.   Will consult Vascular for further opinion on CT finding. She is on Eliquis, monitor   Hold home antihypertensive   Speech eval  Podiatry follow up for right heal pressure wound   On oral iron   PPI BID  ICS. Spoke and updated family clinical care     DNR    Additional Notes:    Time spent >30 minutes    Case discussed with:  [x]Patient  [x]Family  [x]Nursing  [x]Case Management  DVT Prophylaxis:  []Lovenox  []Hep SQ  [x]Eliquis  []Coumadin   []On Heparin gtt    Signed By: Parish Smith MD     2020 9:02 AM              Objective:   VS:   Visit Vitals  /57 (BP 1 Location: Left arm, BP Patient Position: At rest)   Pulse 80   Temp 97.5 °F (36.4 °C)   Resp 18   Ht 5' (1.524 m)   LMP  (LMP Unknown)   SpO2 100%   BMI 29.29 kg/m²      Tmax/24hrs: Temp (24hrs), Av.6 °F (36.4 °C), Min:97.2 °F (36.2 °C), Max:97.8 °F (36.6 °C)      Intake/Output Summary (Last 24 hours) at 2020 0902  Last data filed at 2020 9895  Gross per 24 hour   Intake 2250 ml   Output    Net 2250 ml     Tele:   General:  Cooperative, Not in acute distress, speaks in short sentence while in bed  HEENT: PERRL, EOMI, supple neck, no JVD, dry oral mucosa  Cardiovascular: S1S2 regular, no rub/gallop   Pulmonary: Clear air entry bilaterally, no wheezing, no crackle  GI:  Soft, + tender, non distended, +bs, no guarding   Extremities:  No pedal edema, +distal pulses appreciated   Right heel with pressure ulcer, non-infected   Neuro: AOx3, moving all extremities    Additional:       Current Facility-Administered Medications   Medication Dose Route Frequency    ferrous sulfate 300 mg (60 mg iron)/5 mL oral syrup 300 mg  300 mg Oral DAILY WITH BREAKFAST    latanoprost (XALATAN) 0.005 % ophthalmic solution 1 Drop  1 Drop Both Eyes QPM    oxyCODONE IR (ROXICODONE) tablet 2.5 mg  2.5 mg Oral Q6H PRN    memantine (NAMENDA) tablet 10 mg  10 mg Oral DAILY    apixaban (ELIQUIS) tablet 2.5 mg  2.5 mg Oral BID    0.9% sodium chloride infusion  125 mL/hr IntraVENous CONTINUOUS    sodium chloride (NS) flush 5-10 mL  5-10 mL IntraVENous PRN    cefepime (MAXIPIME) 2 g in sterile water (preservative free) 10 mL IV syringe  2 g IntraVENous Q24H  acetaminophen (TYLENOL) tablet 650 mg  650 mg Oral Q6H PRN    ondansetron (ZOFRAN) injection 4 mg  4 mg IntraVENous Q6H PRN    0.9% sodium chloride infusion  75 mL/hr IntraVENous CONTINUOUS            Lab/Data Review:  Labs: Results:       Chemistry Recent Labs     02/28/20 0309 02/27/20  1430 02/27/20 0139   GLU 83 157* 198*   * 146* 142   K 3.8 3.9 3.2*   * 111 107   CO2 20* 22 19*   BUN 74* 81* 70*   CREA 1.82* 2.35* 2.30*   BUCR 41* 34* 30*   AGAP 8 13 16   CA 7.8* 9.6 10.5*     Recent Labs     02/28/20 0309 02/27/20  1430 02/27/20 0139   ALT 14 18 21   SGOT 22 25 22   TP 5.4* 6.9 8.2   ALB 2.2* 2.8* 3.4   GLOB 3.2 4.1* 4.8*   AGRAT 0.7* 0.7* 0.7*      CBC w/Diff Recent Labs     02/28/20 0309 02/27/20  1430   WBC 11.2 20.6*   RBC 3.91* 4.77   HGB 11.2* 13.9   HCT 34.9* 42.1   MCV 89.3 88.3   MCH 28.6 29.1   MCHC 32.1 33.0   RDW 16.2* 15.7*    263   BANDS  --  9*   GRANS  --  84*   LYMPH  --  3*   EOS  --  0      Coagulation Recent Labs     02/28/20 0309   APTT 34.5       Iron/Ferritin Lab Results   Component Value Date/Time    Iron 25 (L) 07/31/2016 01:35 AM    TIBC 201 (L) 07/31/2016 01:35 AM    Iron % saturation 12 07/31/2016 01:35 AM    Ferritin 121 07/31/2016 01:35 AM       BNP    Cardiac Enzymes Lab Results   Component Value Date/Time    CK 47 01/08/2020 07:35 PM    CK - MB <1.0 01/08/2020 07:35 PM    CK-MB Index  01/08/2020 07:35 PM     CALCULATION NOT PERFORMED WHEN RESULT IS BELOW LINEAR LIMIT    Troponin-I, QT 0.07 (H) 02/27/2020 02:30 PM        Lactic Acid    Thyroid Studies          All Micro Results     Procedure Component Value Units Date/Time    CULTURE, BLOOD [[id number]] Collected:  02/27/20 1613    Order Status:  Completed Specimen:  Blood Updated:  02/28/20 0653     Special Requests: NO SPECIAL REQUESTS        Culture result: NO GROWTH AFTER 13 HOURS       CULTURE, BLOOD [[id number]] Collected:  02/27/20 1558    Order Status:  Completed Specimen:  Blood Updated: 02/28/20 0653     Special Requests: NO SPECIAL REQUESTS        Culture result: NO GROWTH AFTER 13 HOURS       CULTURE, URINE [[id number]] Collected:  02/27/20 1720    Order Status:  Completed Specimen:  Clean catch Updated:  02/27/20 1850            Images:    CT (Most Recent). CT Results (most recent):  Results from Hospital Encounter encounter on 02/27/20   CT ABD PELV WO CONT    Narrative EXAM:  CT Abdomen-Pelvis without Contrast.    CLINICAL INDICATION:  Sent in from nursing home due to abdominal pain with  nausea and vomiting for 2 days. Sustained a fall yesterday. Dark diarrhea. Abnormal urinalysis. Acute renal failure. Severe sepsis. COMPARISON:  None    TECHNIQUE:      - Helical volumetric CT imaging of the abdomen and pelvis is performed without  IV contrast administration. Coronal and sagittal multiplanar reconstruction  images are generated for improved anatomic delineation.  - All CT scans at this facility are performed using dose optimization technique  as appropriate to the performed exam, to include automated exposure control,  adjustment of the mA and/or kV according to patient's size (Including  appropriate matching for site-specific examinations), or use of iterative  reconstruction technique. FINDINGS:    Lung Bases:  No airspace opacities. Liver, Adrenal Glands, Pancreas:  Unremarkable. Gallbladder:  Distended gallbladder. Small gallstone located near the  gallbladder neck measuring about 0.2 cm (axial #21). Spleen:  Although the spleen itself is unremarkable, there is a dense calcific  structure immediately adjacent to the splenic hilar region, measuring about 1.9  cm in diameter. Kidneys:  Fat attenuation nodule in the right kidney, measuring about 1.0 x 0.9  cm (axial #22). The left kidney reveals an exophytic hypodensity located near  the lower pole, measuring about 1.6 x 1.4 cm (axial #31).   Another hypodensity  is identified in the left kidney measuring about 0.9 x 0.67 m (axial #27). GI Tract: Thickened distal esophagus. The stomach appears grossly  unremarkable. No acute small bowel abnormalities are noted. No acute small  bowel obstruction. The appendix is not confidently visualized. No convincing  evidence of acute appendicitis is detected. No acute colitis or acute  diverticulitis is detected. Severe diverticulosis coli. Moderate stool  retention in the rectum. Nodes:  No mesenteric or retroperitoneal adenopathy. Vascular: Moderate to pronounced atheromatous plaque calcifications. There is  suggestion of a saccular aneurysm in the infrarenal abdominal aorta, with  apparent partially calcified intimal flap protruding into the lumen (axial #29). Because of the this structure being visualized only on 2 or 3 consecutive  slices, aortic dissection is felt to be unlikely but cannot be entirely  excluded. At the level of this probable saccular aneurysm, the aorta measures  about 2.4 x 2.3 cm in diameter. Bladder:  Secondary to the right hip joint replacement prosthetic component  producing severe Beam scatter artifact, details evaluation of the bladder is  difficult. This is further complicated by the hardware from the previous ORIF  of the left hip. Within the technical limitations of the study, the  underdistended bladder appears grossly unremarkable. Uterus:  Small fibroid suggested along the ventral aspect of the uterus. Adnexa:  No adnexal mass. Bones:  No acute bony abnormalities. Status post right hip joint replacement  and ORIF of the left proximal femur. Impression IMPRESSION:    1. Abnormally thickened appearance of the distal esophagus. Possibly from  reflux esophagitis vs. artifact from hiatal hernia. No acute findings along the  remainder of the gastrointestinal tract. 2.  Distended gallbladder with a small gallstone. 3.  Renal hypodensities.   The right renal hypodensity probably corresponds to a  renal cyst. The left renal hypodensity is of fat attenuation and probably  implies an angiomyolipoma. 4.  Possible saccular aneurysm involving the infrarenal abdominal aorta vs. much  less likely short dissection. If the dissection is indeed present, it is quite  limited in terms of length and is probably of chronic nature. XRAY (Most Recent)      EKG No results found for this or any previous visit.      2D ECHO

## 2020-02-28 NOTE — PROGRESS NOTES
NUTRITION    Nursing Referral:  Pressure Injury     RECOMMENDATIONS / PLAN:     - Add supplement: Ensure Enlive BID, magic Cup once daily  - Continue RD inpatient monitoring and evaluation. NUTRITION INTERVENTIONS & DIAGNOSIS:     - Meals/snacks: modified composition  - Medical food supplement therapy: initiate  - Collaboration and referral of nutrition care: pt discussed with SLP and CNA     Nutrition Diagnosis: Predicted inadequate energy intake related to decreased appetite, pt with dementia, possible dislike of diet consistency as evidenced by pt with variable/poor meal intake since admission. Increased nutrient needs (protein) related to promotion of wound healing as evidenced by pt with pressure injury    ASSESSMENT:     Per daughter report, pt eats small meals at nursing facility; usually consumes Ensure and Magic Cup for additional energy/ protein intake. Ate 50% of breakfast today per CNA; 0% of lunch, possibly to dislike of mechanical soft consistency per report. Food preferences discussed. Daughter agreeable to continuing nutrition supplements. Pt with pressure injury to right heel; also has PATTI, protein needs limited. Pt seen by SLP today    Nutritional intake adequate to meet patients estimated nutritional needs:  Unable to determine at this time    Diet: DIET CARDIAC Mechanical Soft      Food Allergies:  None known   Current Appetite: Fair  Appetite/meal intake prior to admission: eats small meals and supplements with Ensure drinks and Magic Cup; per daugher report, pt \"eats like a bird\". Feeding Limitations:  [x] Swallowing difficulty: SLP following    [] Chewing difficulty    [] Other:  Current Meal Intake: No data found. BM: 2/28  Skin Integrity:  Right heel unstageable pressure injury  Edema:   [x] No     [] Yes   Pertinent Medications: Reviewed: Bowel regimen, D5 1/2NS at 100 mL/hr (120 gm dextrose, 408 kcal per day), ferrous sulfate, ondansetron, pantoprazole.   miralax ordered    Recent Labs     02/28/20  0309 02/27/20  1430 02/27/20  0139   * 146* 142   K 3.8 3.9 3.2*   * 111 107   CO2 20* 22 19*   GLU 83 157* 198*   BUN 74* 81* 70*   CREA 1.82* 2.35* 2.30*   CA 7.8* 9.6 10.5*   ALB 2.2* 2.8* 3.4   SGOT 22 25 22   ALT 14 18 21       Intake/Output Summary (Last 24 hours) at 2/28/2020 1608  Last data filed at 2/28/2020 0659  Gross per 24 hour   Intake 2250 ml   Output    Net 2250 ml       Anthropometrics:  Ht Readings from Last 1 Encounters:   02/27/20 5' (1.524 m)     Last 3 Recorded Weights in this Encounter    02/28/20 1130   Weight: 64.8 kg (142 lb 12.8 oz)     Body mass index is 27.89 kg/m². Weight History:  Per daughter and chart hx, pt with UBW  between 145-150 lb    Weight Metrics 2/28/2020 2/27/2020 1/8/2020 2/8/2019 1/31/2019 4/10/2018 3/14/2017   Weight 142 lb 12.8 oz - 150 lb 150 lb 150 lb 150 lb 145 lb   BMI - 27.89 kg/m2 29.29 kg/m2 26.57 kg/m2 26.57 kg/m2 26.57 kg/m2 27.4 kg/m2        Admitting Diagnosis: Severe sepsis (HCC) [A41.9, R65.20]  UTI (urinary tract infection) [N39.0]  ARF (acute renal failure) (HCC) [N17.9]  UTI (urinary tract infection) [N39.0]  Pertinent PMHx: dyslipidemia, HTN, osteoporosis, dementia     Education Needs:        [x] None identified  [] Identified - Not appropriate at this time  []  Identified and addressed - refer to education log  Learning Limitations:   [] None identified  [x] Identified: dementia     Cultural, Hindu & ethnic food preferences:  [x] None identified    [] Identified and addressed     ESTIMATED NUTRITION NEEDS:     Calories: 0349-4455 kcal (MSJx1.2-1.5) based on  [x] Actual BW: 65 kg      [] IBW   Protein: 65-85 gm (1-1.3 gm/kg) based on  [x] Actual BW      [] IBW   Fluid: 1 mL/kcal     MONITORING & EVALUATION:     Nutrition Goal(s):   - PO nutrition intake will meet >75% of patient estimated nutritional needs within the next 7 days.    Outcome: New/Initial goal     Monitoring:   [x] Food and nutrient intake   [x] Food and nutrient administration  [x] Comparative standards   [x] Nutrition-focused physical findings   [x] Anthropometric Measurements   [x] Treatment/therapy   [x] Biochemical data, medical tests, and procedures        Previous Recommendations (for follow-up assessments only):  Not Applicable     Discharge Planning: No nutritional discharge needs at this time. Participated in care planning, discharge planning, & interdisciplinary rounds as appropriate.       Tawanna Saucedo RD  Pager: [phone]

## 2020-02-28 NOTE — H&P
HISTORY & PHYSICAL      Patient: Margarita Palacio MRN: [id number]  [facility]: [id number]    YOB: 1926  Age: 80 y.o. Sex: female    DOA: 2/27/2020 LOS:  LOS: 0 days        DOA: 2/27/2020        Assessment/Plan     Active Problems:    UTI (urinary tract infection) (2/27/2020)      Severe sepsis ([facility] [address] (2/27/2020)      ARF (acute renal failure) ([email or web address] (2/27/2020)        Plan:  1. UTI IV antibiotics, cefepime  2. Leukocytosis likely secondary to UTI, monitor  3. Acute renal failurelikely secondary to nausea, vomiting and dehydration, IV fluids  4. History of DVTcontinue Eliquis  5. Significant dementiacontinue Namenda  6. Recent partial right hip replacement at [facility]  7. Severe constipationcontinue MiraLAX  DVT prophylaxisEliquis  DNR              HPI:     Margarita Palacio is a 80 y.o. female who is being admitted to the hospital secondary to urinary tract infection, dehydration and acute renal failure. Patient has a significant history of dementia and is unable to provide any information. Her son, [facility] is at bedside and most of the information was obtained from them. Patient is a resident of an [address] [place] [address] on [place], was brought to the ER secondary to nausea and vomiting with poor oral intake and UTI. Per the son the patient was recently diagnosed with UTI and was being treated as an outpatient. ER evaluation patient noted to have leukocytosis with an elevated lactic acid but was not hypotensive , febrile or tachypneic. She was started on broad-spectrum IV antibiotics and is admitted being admitted to the hospital for further evaluation.     Past Medical History:   Diagnosis Date    Anticoagulated on Coumadin     Anticoagulation goal of INR 2 to 3     Decreased calculated glomerular filtration rate (GFR) 8/1/2016    Calculated GFR equivalent to that of CKD stage 3 = 30-59 ml/min    Dementia (HCC)     DVT (deep venous thrombosis) ([facility]     Dyslipidemia     Essential hypertension     Glaucoma     Osteoporosis     Postoperative anemia due to acute blood loss 7/29/2016       No past surgical history on file. Family History   Problem Relation Age of Onset    Heart Disease Mother     Diabetes Mother     Arthritis-osteo Father     Cancer Brother        Social History     Socioeconomic History    Marital status:      Spouse name: Not on file    Number of children: Not on file    Years of education: Not on file    Highest education level: Not on file   Tobacco Use    Smoking status: Never Smoker    Smokeless tobacco: Never Used   Substance and Sexual Activity    Alcohol use: No    Drug use: No       Prior to Admission medications    Medication Sig Start Date End Date Taking? Authorizing Provider   oxyCODONE IR (ROXICODONE) 5 mg immediate release tablet Take 2.5 mg by mouth. 1/1/20   Julio Palomo MD   atenolol (TENORMIN) 50 mg tablet Take 50 mg by mouth. Julio Palomo MD   lisinopril (PRINIVIL, ZESTRIL) 20 mg tablet Take 20 mg by mouth daily. Julio Palomo MD   memantine (NAMENDA) 5 mg tablet Take 10 mg by mouth. Julio Palomo MD   apixaban (ELIQUIS) 2.5 mg tablet Start taking 5mg BID x 1 week. Then decrease to 2.5mg BID. Indications: blood clot in a deep vein of the extremities 1/9/20   Celso Marquez NP   nystatin (MYCOSTATIN) 100,000 unit/gram ointment Apply  to affected area two (2) times a day. 1/8/20   Celso Marquez NP   traMADol (ULTRAM) 50 mg tablet Take 1 Tab by mouth every six (6) hours as needed for Pain. Max Daily Amount: 200 mg. 2/8/19   Mariela Rodríguez MD   traMADol [employer]) 50 mg tablet Take 1 Tab by mouth every six (6) hours as needed for Pain. Max Daily Amount: 200 mg. 1/31/19   Amie Martinez NP   acetaminophen (TYLENOL) 325 mg tablet Take  by mouth every four (4) hours as needed for Pain. Provider, Michele   bimatoprost (LUMIGAN) 0.01 % ophthalmic drops Administer 1 Drop to both eyes every evening.     Provider, Historical   ferrous sulfate 300 mg (60 mg iron)/5 mL syrup Take 5 mL by mouth daily (with breakfast). 8/17/16   Katy Walter MD   simvastatin (ZOCOR) 20 mg tablet Take 20 mg by mouth nightly. Indications: DYSLIPIDEMIA    Other, MD Julio       No Known Allergies    Review of Systems  Review of systems not obtained due to patient factors. Physical Exam:      Visit Vitals  /56   Pulse 90   Temp 97.8 °F (36.6 °C)   Resp 22   Ht 5' (1.524 m)   SpO2 97%   BMI 29.29 kg/m²       Physical Exam:    Gen: In general, this is a thinly built female in no acute distress  HEENT: Sclerae nonicteric. Oral mucous membranes moist. Dentition poor  Neck: Supple with midline trachea. CV: RRR without murmur or rub appreciated. Resp:Respirations are unlabored without use of accessory muscles. Lung fields B/L without wheezes or rhonchi. Abd: Soft, nontender, nondistended. Extrem: Extremities are warm, without cyanosis or clubbing. No pitting pretibial edema. Skin: Warm, no visible rashes. Neuro: Patient is awake , has a h/o dementia     Labs Reviewed:    Recent Results (from the past 24 hour(s))   C.  DIFFICILE AG & TOXIN A/B    Collection Time: 02/27/20  1:39 AM   Result Value Ref Range    GDH ANTIGEN NEGATIVE  NEG      C. difficile toxin NEGATIVE  NEG      INTERPRETATION NEGATIVE FOR TOXIGENIC C. DIFFICILE NTXCD     LIPASE    Collection Time: 02/27/20  1:39 AM   Result Value Ref Range    Lipase 187 73 - 065 U/L   METABOLIC PANEL, COMPREHENSIVE    Collection Time: 02/27/20  1:39 AM   Result Value Ref Range    Sodium 142 136 - 145 mmol/L    Potassium 3.2 (L) 3.5 - 5.5 mmol/L    Chloride 107 100 - 111 mmol/L    CO2 19 (L) 21 - 32 mmol/L    Anion gap 16 3.0 - 18 mmol/L    Glucose 198 (H) 74 - 99 mg/dL    BUN 70 (H) 7.0 - 18 MG/DL    Creatinine 2.30 (H) 0.6 - 1.3 MG/DL    BUN/Creatinine ratio 30 (H) 12 - 20      GFR est AA 24 (L) >60 ml/min/1.73m2    GFR est non-AA 20 (L) >60 ml/min/1.73m2    Calcium 10.5 (H) 8.5 - 10.1 MG/DL    Bilirubin, total 0.7 0.2 - 1.0 MG/DL    ALT (SGPT) 21 13 - 56 U/L    AST (SGOT) 22 10 - 38 U/L    Alk. phosphatase 97 45 - 117 U/L    Protein, total 8.2 6.4 - 8.2 g/dL    Albumin 3.4 3.4 - 5.0 g/dL    Globulin 4.8 (H) 2.0 - 4.0 g/dL    A-G Ratio 0.7 (L) 0.8 - 1.7     METABOLIC PANEL, COMPREHENSIVE    Collection Time: 02/27/20  2:30 PM   Result Value Ref Range    Sodium 146 (H) 136 - 145 mmol/L    Potassium 3.9 3.5 - 5.5 mmol/L    Chloride 111 100 - 111 mmol/L    CO2 22 21 - 32 mmol/L    Anion gap 13 3.0 - 18 mmol/L    Glucose 157 (H) 74 - 99 mg/dL    BUN 81 (H) 7.0 - 18 MG/DL    Creatinine 2.35 (H) 0.6 - 1.3 MG/DL    BUN/Creatinine ratio 34 (H) 12 - 20      GFR est AA 23 (L) >60 ml/min/1.73m2    GFR est non-AA 19 (L) >60 ml/min/1.73m2    Calcium 9.6 8.5 - 10.1 MG/DL    Bilirubin, total 0.6 0.2 - 1.0 MG/DL    ALT (SGPT) 18 13 - 56 U/L    AST (SGOT) 25 10 - 38 U/L    Alk. phosphatase 89 45 - 117 U/L    Protein, total 6.9 6.4 - 8.2 g/dL    Albumin 2.8 (L) 3.4 - 5.0 g/dL    Globulin 4.1 (H) 2.0 - 4.0 g/dL    A-G Ratio 0.7 (L) 0.8 - 1.7     CBC WITH AUTOMATED DIFF    Collection Time: 02/27/20  2:30 PM   Result Value Ref Range    WBC 20.6 (H) 4.6 - 13.2 K/uL    RBC 4.77 4.20 - 5.30 M/uL    HGB 13.9 12.0 - 16.0 g/dL    HCT 42.1 35.0 - 45.0 %    MCV 88.3 74.0 - 97.0 FL    MCH 29.1 24.0 - 34.0 PG    MCHC 33.0 31.0 - 37.0 g/dL    RDW 15.7 (H) 11.6 - 14.5 %    PLATELET 324 880 - 706 K/uL    MPV 12.2 (H) 9.2 - 11.8 FL    NEUTROPHILS 84 (H) 42 - 75 %    BAND NEUTROPHILS 9 (H) 0 - 5 %    LYMPHOCYTES 3 (L) 20 - 51 %    MONOCYTES 4 2 - 9 %    EOSINOPHILS 0 0 - 5 %    BASOPHILS 0 0 - 3 %    ABS. NEUTROPHILS 19.2 (H) 1.8 - 8.0 K/UL    ABS. LYMPHOCYTES 0.6 (L) 0.8 - 3.5 K/UL    ABS. MONOCYTES 0.8 0 - 1.0 K/UL    ABS. EOSINOPHILS 0.0 0.0 - 0.4 K/UL    ABS.  BASOPHILS 0.0 0.0 - 0.06 K/UL    DF MANUAL      PLATELET COMMENTS ADEQUATE PLATELETS      RBC COMMENTS ANISOCYTOSIS  1+       LIPASE    Collection Time: 02/27/20 2:30 PM   Result Value Ref Range    Lipase 86 73 - 393 U/L   TROPONIN I    Collection Time: 02/27/20  2:30 PM   Result Value Ref Range    Troponin-I, QT 0.07 (H) 0.0 - 0.045 NG/ML   LACTIC ACID    Collection Time: 02/27/20  3:58 PM   Result Value Ref Range    Lactic acid 2.4 (HH) 0.4 - 2.0 MMOL/L   EKG, 12 LEAD, INITIAL    Collection Time: 02/27/20  4:10 PM   Result Value Ref Range    Ventricular Rate 72 BPM    Atrial Rate 72 BPM    P-R Interval 142 ms    QRS Duration 82 ms    Q-T Interval 460 ms    QTC Calculation (Bezet) 503 ms    Calculated P Axis 48 degrees    Calculated R Axis -2 degrees    Calculated T Axis 30 degrees    Diagnosis       Sinus rhythm with premature supraventricular complexes and with frequent   premature ventricular complexes  Nonspecific ST and T wave abnormality  Abnormal ECG  When compared with ECG of 08-JAN-2020 16:07,  premature ventricular complexes are now present  premature supraventricular complexes are now present  T wave inversion more evident in Anterior leads     URINALYSIS W/ RFLX MICROSCOPIC    Collection Time: 02/27/20  5:20 PM   Result Value Ref Range    Color DARK YELLOW      Appearance TURBID      Specific gravity 1.018 1.005 - 1.030      pH (UA) 6.0 5.0 - 8.0      Protein 30 (A) NEG mg/dL    Glucose NEGATIVE  NEG mg/dL    Ketone 15 (A) NEG mg/dL    Bilirubin SMALL (A) NEG      Blood NEGATIVE  NEG      Urobilinogen 1.0 0.2 - 1.0 EU/dL    Nitrites NEGATIVE  NEG      Leukocyte Esterase MODERATE (A) NEG     URINE MICROSCOPIC ONLY    Collection Time: 02/27/20  5:20 PM   Result Value Ref Range    WBC 5 to 9 0 - 4 /hpf    RBC NONE 0 - 5 /hpf    Epithelial cells FEW 0 - 5 /lpf    Bacteria 4+ (A) NEG /hpf    Amorphous Crystals 4+ (A) NEG   LACTIC ACID    Collection Time: 02/27/20  5:29 PM   Result Value Ref Range    Lactic acid 2.6 (HH) 0.4 - 2.0 MMOL/L   OCCULT BLOOD, STOOL    Collection Time: 02/27/20  6:00 PM   Result Value Ref Range    Occult blood, stool NEGATIVE  NEG Imaging Reviewed:    CT Abd & Pelvis - currently pending           Jessie Aparicio MD  2/27/2020, 7:23 PM        Disclaimer: Sections of this note are dictated using utilizing voice recognition software. Minor typographical errors may be present. If questions arise, please do not hesitate to contact me or call our department.

## 2020-02-28 NOTE — PROGRESS NOTES
Matched pt in cc link with [address] [place] [address] in anticipation of pts return there at discharge.   Marlen Lyles RN - Outcomes Manager  [phone]

## 2020-02-28 NOTE — PROGRESS NOTES
Reason for Admission:  Severe sepsis ([facility] [address] [A41.9, R65.20]  UTI (urinary tract infection) [N39.0]  ARF (acute renal failure) (HCC) [N17.9]  UTI (urinary tract infection) [N39.0]                 RRAT Score:    17%            Plan for utilizing home health:    no                      Likelihood of Readmission:   LOW                         Transition of Care Plan:              Initial assessment completed with son/NARESH Kearney ([phone]). Cognitive status of patient: disoriented. Face sheet information confirmed:  yes. The patient designates sonCarolina to participate in her discharge plan and to receive any needed information. This patient lives in a long term care facility. Patient is not able to navigate steps as needed. Prior to hospitalization, patient was considered to be independent with ADLs/IADLS : no . If not independent,  patient needs assist with : dressing, bathing, food preparation, cooking, toileting and grooming    Patient has a current ACP document on file: yes  The patient will require stretcher transport home upon discharge. The patient already has W/C medical equipment available in the home. Patient is not currently active with home health. Patient has stayed in a skilled nursing facility or rehab. Was  stay within last 60 days : yes. This patient is on dialysis :no    Currently, the discharge plan is LTC. has    The patient states that she can obtain her medications from the pharmacy, and take her medications as directed. Patient's current insurance is Medicare and [employer]. Spoke with pts son/NARESH Kearney at pts bedside. Pt fell and broke her hip back in late December; admitted to [facility]. Pt was sent to [address] [place] [address] for skilled nursing but didn't do well and was converted to LTC. Per Alayna at [facility], pt is welcome back when medically cleared. Pt has dementia and is unable to participate with rehab effectively.   Plan for patient to return to [facility] when medically cleared. Care Management Interventions  PCP Verified by CM:  Yes  Mode of Transport at Discharge: BLS  Transition of Care Consult (CM Consult): Discharge Planning, [address]: Nursing Facility  Confirm Follow Up Transport: Other (see comment)(will need stretcher transport due to dementia and hx of falls)  The Plan for Transition of Care is Related to the Following Treatment Goals : long term care  The Patient and/or Patient Representative was Provided with a Choice of Provider and Agrees with the Discharge Plan?: No  Freedom of Choice List was Provided with Basic Dialogue that Supports the Patient's Individualized Plan of Care/Goals, Treatment Preferences and Shares the Quality Data Associated with the Providers?: No  Discharge Location  Discharge Placement: [facility]  Renetta Bautista RN - Outcomes Manager  [phone]

## 2020-02-28 NOTE — ED NOTES
TRANSFER - OUT REPORT:    Verbal report given to [facility] OF [facility]WINA RN(name) on Jace Rossi  being transferred to (unit) for routine progression of care       Report consisted of patients Situation, Background, Assessment and   Recommendations(SBAR). Information from the following report(s) SBAR, Kardex, ED Summary, MAR, Accordion and Recent Results was reviewed with the receiving nurse. Lines:   Peripheral IV 02/27/20 Left Hand (Active)   Site Assessment Clean, dry, & intact 2/27/2020  3:12 PM   Phlebitis Assessment 0 2/27/2020  3:12 PM   Infiltration Assessment 4 2/27/2020  3:12 PM   Dressing Status Clean, dry, & intact 2/27/2020  3:12 PM   Dressing Type Transparent 2/27/2020  3:12 PM   Alcohol Cap Used Yes 2/27/2020  3:12 PM       Peripheral IV 02/27/20 Left Antecubital (Active)   Site Assessment Clean, dry, & intact 2/27/2020  4:02 PM   Phlebitis Assessment 0 2/27/2020  4:02 PM   Infiltration Assessment 0 2/27/2020  4:02 PM   Dressing Status Clean, dry, & intact 2/27/2020  4:02 PM   Dressing Type 4 X 4;Transparent;Tape 2/27/2020  4:02 PM   Hub Color/Line Status Pink;Patent; Flushed 2/27/2020  4:02 PM        Opportunity for questions and clarification was provided.       Patient transported with:   Registered Nurse

## 2020-02-28 NOTE — CONSULTS
Consult Note    Consult requested by: Rachell Murphy MD    ADMIT DATE: 2/27/2020    CONSULT DATE: February 28, 2020           Admission diagnosis: AMS, UTI   Reason for Nephrology Consultation: PATTI    Assessment and plan:    #1 PATTI due to prerenal azotemia in the setting of poor intake/dehydration due to diarrhea /UTI  #2 sepsis/sirs  #3 gram-negative rods UTI  #4 hypertension uremic metabolic acidosis   #5 lactic acidosis, improved  #6 altered mental status  #7 dementia  #8 hypernatremia    Patient does appear to be volume depleted, was hypotensive on admission, was on multiple blood pressure agents including lisinopril atenolol which have been appropriately held. Blood pressure has improved, sodium appears to be high so fluids have been switched to hypotonic fluid. Monitor mental status wise still appears to be not improving. But at baseline she does have significant dementia    Plan:    #1 agree with D5 half-normal saline at 100 cc an  #2 recheck renal panel this evening to follow on sodium and renal parameters  #3 continue to hold her lisinopril and metoprolol, okay to keep blood pressures in 130s to 729 range systolic  #4 avoid nephrotoxins and NSAIDs  #5 follow vascular recommendations  #6 renally dose medications and antibiotics, avoid IV contrast  #7 check bladder scans frequently to monitor for retention      HPI:   Patient is a 51-year-old white female with history of dementia, resident of months ago or nursing facility, recent right hip fracture status post surgery at [facility], who presented to the emergency department with complaints of nausea vomiting and dark stool for the last few days. Patient did have diarrhea for the last several days, was dark in color, also had worsening lethargy. Chest x-ray was done and urine analysis was checked. Patient was diagnosed with a UTI and was started on antibiotics.   But patient started having dark-colored emesis and therefore she was sent to the emergency room for evaluation. On exam patient was noted to have leukocytosis. ,  Elevated lactic acid, UA suggestive of a possible UTI, sodium of 149, creatinine of 2.3 BUN of 70, potassium of 3.2, CO2 of 19, lactic acid of 2.4. Patient was started on IV fluids and antibiotics. CT abdomen was done without contrast which showed an infrarenal saccular aneurysm versus chronic dissection. Vascular also has been consulted. Past Medical History:   Diagnosis Date    Anticoagulated on Coumadin     Anticoagulation goal of INR 2 to 3     Decreased calculated glomerular filtration rate (GFR) 8/1/2016    Calculated GFR equivalent to that of CKD stage 3 = 30-59 ml/min    Dementia (HCC)     DVT (deep venous thrombosis) (HCC)     Dyslipidemia     Essential hypertension     Glaucoma     Osteoporosis     Postoperative anemia due to acute blood loss 7/29/2016      No past surgical history on file.     Social History     Socioeconomic History    Marital status:      Spouse name: Not on file    Number of children: Not on file    Years of education: Not on file    Highest education level: Not on file   Occupational History    Not on file   Social Needs    Financial resource strain: Not on file    Food insecurity:     Worry: Not on file     Inability: Not on file    Transportation needs:     Medical: Not on file     Non-medical: Not on file   Tobacco Use    Smoking status: Never Smoker    Smokeless tobacco: Never Used   Substance and Sexual Activity    Alcohol use: No    Drug use: No    Sexual activity: Not on file   Lifestyle    Physical activity:     Days per week: Not on file     Minutes per session: Not on file    Stress: Not on file   Relationships    Social connections:     Talks on phone: Not on file     Gets together: Not on file     Attends Taoism service: Not on file     Active member of club or organization: Not on file     Attends meetings of clubs or organizations: Not on file Relationship status: Not on file    Intimate partner violence:     Fear of current or ex partner: Not on file     Emotionally abused: Not on file     Physically abused: Not on file     Forced sexual activity: Not on file   Other Topics Concern    Not on file   Social History Narrative    Not on file       Family History   Problem Relation Age of Onset    Heart Disease Mother     Diabetes Mother     Arthritis-osteo Father     Cancer Brother      No Known Allergies     Home Medications:     Medications Prior to Admission   Medication Sig    oxyCODONE IR (ROXICODONE) 5 mg immediate release tablet Take 2.5 mg by mouth.  atenolol (TENORMIN) 50 mg tablet Take 50 mg by mouth.  lisinopril (PRINIVIL, ZESTRIL) 20 mg tablet Take 20 mg by mouth daily.  memantine (NAMENDA) 5 mg tablet Take 10 mg by mouth.  apixaban (ELIQUIS) 2.5 mg tablet Start taking 5mg BID x 1 week. Then decrease to 2.5mg BID. Indications: blood clot in a deep vein of the extremities    nystatin (MYCOSTATIN) 100,000 unit/gram ointment Apply  to affected area two (2) times a day.  traMADol (ULTRAM) 50 mg tablet Take 1 Tab by mouth every six (6) hours as needed for Pain. Max Daily Amount: 200 mg.  traMADol (ULTRAM) 50 mg tablet Take 1 Tab by mouth every six (6) hours as needed for Pain. Max Daily Amount: 200 mg.  acetaminophen (TYLENOL) 325 mg tablet Take  by mouth every four (4) hours as needed for Pain.  bimatoprost (LUMIGAN) 0.01 % ophthalmic drops Administer 1 Drop to both eyes every evening.  ferrous sulfate 300 mg (60 mg iron)/5 mL syrup Take 5 mL by mouth daily (with breakfast).  simvastatin (ZOCOR) 20 mg tablet Take 20 mg by mouth nightly.  Indications: DYSLIPIDEMIA       Current Inpatient Medications:     Current Facility-Administered Medications   Medication Dose Route Frequency    ferrous sulfate 300 mg (60 mg iron)/5 mL oral syrup 300 mg  300 mg Oral DAILY WITH BREAKFAST    latanoprost (XALATAN) 0.005 % ophthalmic solution 1 Drop  1 Drop Both Eyes QPM    memantine (NAMENDA) tablet 10 mg  10 mg Oral DAILY    apixaban (ELIQUIS) tablet 2.5 mg  2.5 mg Oral BID    traMADol (ULTRAM) tablet 50 mg  50 mg Oral Q6H PRN    [START ON 2/29/2020] polyethylene glycol (MIRALAX) packet 17 g  17 g Oral DAILY    bisacodyL (DULCOLAX) suppository 10 mg  10 mg Rectal DAILY PRN    oxyCODONE IR (ROXICODONE) tablet 2.5 mg  2.5 mg Oral Q6H PRN    dextrose 5 % - 0.45% NaCl infusion  100 mL/hr IntraVENous CONTINUOUS    pantoprazole (PROTONIX) tablet 40 mg  40 mg Oral ACB&D    sodium chloride (NS) flush 5-10 mL  5-10 mL IntraVENous PRN    cefepime (MAXIPIME) 2 g in sterile water (preservative free) 10 mL IV syringe  2 g IntraVENous Q24H    acetaminophen (TYLENOL) tablet 650 mg  650 mg Oral Q6H PRN    ondansetron (ZOFRAN) injection 4 mg  4 mg IntraVENous Q6H PRN       Review of Systems:   No fever or chills. No sore throat. No cough or hemoptysis. No shortness of breath or chest pain. No orthopnea or paroxysmal nocturnal dyspnea. No ankle swelling, no joint paints. No muscle aches. No skin changes. No dizziness or lightheadedness. No headaches. Physical Assessment:     Vitals:    02/28/20 0135 02/28/20 0423 02/28/20 0759 02/28/20 1130   BP: 91/58 92/64 130/57    Pulse: 77 86 80    Resp: 16 18 18    Temp: 97.8 °F (36.6 °C) 97.5 °F (36.4 °C) 97.5 °F (36.4 °C)    SpO2: 95% 94% 100%    Weight:    64.8 kg (142 lb 12.8 oz)   Height:         Last 3 Recorded Weights in this Encounter    02/28/20 1130   Weight: 64.8 kg (142 lb 12.8 oz)     Admission weight: Weight: 64.8 kg (142 lb 12.8 oz) (02/28/20 1130)      Intake/Output Summary (Last 24 hours) at 2/28/2020 1804  Last data filed at 2/28/2020 0659  Gross per 24 hour   Intake 900 ml   Output    Net 900 ml       Patient is in no apparent distress. HEENT: dry mucosa   Lungs: good air entry, clear to auscultation bilaterally  Cardiovascular system: S1, S2, regular rate and rhythm. Abdomen: soft, non tender, non distended. BS+  Extremities: no clubbing, cyanosis or edema. Integumentary: skin is grossly intact. Neurologic: awake , not oriented , lethargic       Data Review:    Labs: Results:       Chemistry Recent Labs     02/28/20  0309 02/27/20  1430 02/27/20  0139   GLU 83 157* 198*   * 146* 142   K 3.8 3.9 3.2*   * 111 107   CO2 20* 22 19*   BUN 74* 81* 70*   CREA 1.82* 2.35* 2.30*   CA 7.8* 9.6 10.5*   AGAP 8 13 16   BUCR 41* 34* 30*   AP 67 89 97   TP 5.4* 6.9 8.2   ALB 2.2* 2.8* 3.4   GLOB 3.2 4.1* 4.8*   AGRAT 0.7* 0.7* 0.7*         CBC w/Diff Recent Labs     02/28/20  0309 02/27/20  1430   WBC 11.2 20.6*   RBC 3.91* 4.77   HGB 11.2* 13.9   HCT 34.9* 42.1    263   GRANS  --  84*   LYMPH  --  3*   EOS  --  0         Iron/Ferritin No results for input(s): IRON in the last 72 hours. No lab exists for component: TIBCCALC   PTH/VIT D No results for input(s): PTH in the last 72 hours.     No lab exists for component: VITD           Elaine Blevins MD  2/28/2020  6:04 PM      February 28, 2020

## 2020-02-28 NOTE — ROUTINE PROCESS
Bedside shift change report given to 2020 [facility] [place] RN (oncoming nurses) by Clay Harrison RN (offgoing nurse). Report included the following information Sbar, Mar, Kardex, and Recent Results

## 2020-02-29 LAB
ALBUMIN SERPL-MCNC: 2 G/DL (ref 3.4–5)
ANION GAP SERPL CALC-SCNC: 8 MMOL/L (ref 3–18)
BACTERIA SPEC CULT: ABNORMAL
BACTERIA SPEC CULT: ABNORMAL
BUN SERPL-MCNC: 55 MG/DL (ref 7–18)
BUN/CREAT SERPL: 46 (ref 12–20)
CALCIUM SERPL-MCNC: 7.8 MG/DL (ref 8.5–10.1)
CHLORIDE SERPL-SCNC: 122 MMOL/L (ref 100–111)
CK MB CFR SERPL CALC: 6.9 % (ref 0–4)
CK MB SERPL-MCNC: 2.7 NG/ML (ref 5–25)
CK SERPL-CCNC: 39 U/L (ref 26–192)
CO2 SERPL-SCNC: 18 MMOL/L (ref 21–32)
CREAT SERPL-MCNC: 1.2 MG/DL (ref 0.6–1.3)
GLUCOSE SERPL-MCNC: 94 MG/DL (ref 74–99)
PHOSPHATE SERPL-MCNC: 1.4 MG/DL (ref 2.5–4.9)
POTASSIUM SERPL-SCNC: 3.1 MMOL/L (ref 3.5–5.5)
SERVICE CMNT-IMP: ABNORMAL
SODIUM SERPL-SCNC: 148 MMOL/L (ref 136–145)
TROPONIN I SERPL-MCNC: 0.06 NG/ML (ref 0–0.04)

## 2020-02-29 PROCEDURE — 74011000250 HC RX REV CODE- 250: Performed by: INTERNAL MEDICINE

## 2020-02-29 PROCEDURE — 77030038269 HC DRN EXT URIN PURWCK BARD -A

## 2020-02-29 PROCEDURE — 74011000258 HC RX REV CODE- 258: Performed by: INTERNAL MEDICINE

## 2020-02-29 PROCEDURE — 74011250637 HC RX REV CODE- 250/637: Performed by: INTERNAL MEDICINE

## 2020-02-29 PROCEDURE — 65660000000 HC RM CCU STEPDOWN

## 2020-02-29 PROCEDURE — 36415 COLL VENOUS BLD VENIPUNCTURE: CPT

## 2020-02-29 PROCEDURE — 74011250636 HC RX REV CODE- 250/636: Performed by: INTERNAL MEDICINE

## 2020-02-29 PROCEDURE — 80069 RENAL FUNCTION PANEL: CPT

## 2020-02-29 PROCEDURE — 74011250637 HC RX REV CODE- 250/637: Performed by: HOSPITALIST

## 2020-02-29 PROCEDURE — 82550 ASSAY OF CK (CPK): CPT

## 2020-02-29 RX ORDER — CIPROFLOXACIN 500 MG/1
500 TABLET ORAL EVERY 12 HOURS
Status: DISCONTINUED | OUTPATIENT
Start: 2020-02-29 | End: 2020-02-29 | Stop reason: DRUGHIGH

## 2020-02-29 RX ORDER — FERROUS SULFATE 300 MG/5ML
300 LIQUID (ML) ORAL
Status: DISCONTINUED | OUTPATIENT
Start: 2020-02-29 | End: 2020-03-03 | Stop reason: HOSPADM

## 2020-02-29 RX ORDER — SODIUM BICARBONATE 650 MG/1
650 TABLET ORAL 2 TIMES DAILY
Status: DISCONTINUED | OUTPATIENT
Start: 2020-02-29 | End: 2020-03-03 | Stop reason: HOSPADM

## 2020-02-29 RX ORDER — ATENOLOL 25 MG/1
25 TABLET ORAL DAILY
Status: DISCONTINUED | OUTPATIENT
Start: 2020-02-29 | End: 2020-03-03 | Stop reason: HOSPADM

## 2020-02-29 RX ORDER — CIPROFLOXACIN 500 MG/1
500 TABLET ORAL DAILY
Status: DISCONTINUED | OUTPATIENT
Start: 2020-02-29 | End: 2020-03-03 | Stop reason: HOSPADM

## 2020-02-29 RX ADMIN — MINERAL SUPPLEMENT IRON 300 MG / 5 ML STRENGTH LIQUID 100 PER BOX UNFLAVORED 300 MG: at 10:37

## 2020-02-29 RX ADMIN — LATANOPROST 1 DROP: 50 SOLUTION OPHTHALMIC at 17:11

## 2020-02-29 RX ADMIN — DEXTROSE MONOHYDRATE 100 ML/HR: 5 INJECTION, SOLUTION INTRAVENOUS at 07:45

## 2020-02-29 RX ADMIN — POTASSIUM CHLORIDE: 2 INJECTION, SOLUTION, CONCENTRATE INTRAVENOUS at 17:11

## 2020-02-29 RX ADMIN — SODIUM BICARBONATE 650 MG TABLET 650 MG: at 17:10

## 2020-02-29 RX ADMIN — CIPROFLOXACIN HYDROCHLORIDE 500 MG: 500 TABLET, FILM COATED ORAL at 10:52

## 2020-02-29 RX ADMIN — PANTOPRAZOLE SODIUM 40 MG: 40 TABLET, DELAYED RELEASE ORAL at 16:22

## 2020-02-29 RX ADMIN — APIXABAN 2.5 MG: 2.5 TABLET, FILM COATED ORAL at 17:10

## 2020-02-29 RX ADMIN — SODIUM BICARBONATE 650 MG TABLET 650 MG: at 13:08

## 2020-02-29 RX ADMIN — POTASSIUM CHLORIDE: 2 INJECTION, SOLUTION, CONCENTRATE INTRAVENOUS at 16:20

## 2020-02-29 RX ADMIN — POTASSIUM CHLORIDE: 2 INJECTION, SOLUTION, CONCENTRATE INTRAVENOUS at 13:22

## 2020-02-29 RX ADMIN — APIXABAN 2.5 MG: 2.5 TABLET, FILM COATED ORAL at 10:36

## 2020-02-29 RX ADMIN — MINERAL SUPPLEMENT IRON 300 MG / 5 ML STRENGTH LIQUID 100 PER BOX UNFLAVORED 300 MG: at 13:08

## 2020-02-29 RX ADMIN — PANTOPRAZOLE SODIUM 40 MG: 40 TABLET, DELAYED RELEASE ORAL at 10:36

## 2020-02-29 RX ADMIN — POTASSIUM CHLORIDE: 2 INJECTION, SOLUTION, CONCENTRATE INTRAVENOUS at 15:19

## 2020-02-29 RX ADMIN — POLYETHYLENE GLYCOL 3350 17 G: 17 POWDER, FOR SOLUTION ORAL at 10:36

## 2020-02-29 RX ADMIN — MEMANTINE HYDROCHLORIDE 10 MG: 10 TABLET ORAL at 10:36

## 2020-02-29 NOTE — PROGRESS NOTES
Problem: Dysphagia (Adult)  Goal: *Acute Goals and Plan of Care (Insert Text)  Description  Patient will:  1. Tolerate PO trials with 0 s/s overt distress in 4/5 trials  2. Utilize compensatory swallow strategies/maneuvers (decrease bite/sip, size/rate, alt. liq/sol) with min cues in 4/5 trials    Rec:     Mech-soft diet with thin liquids  Assistance with all PO  No straws  Aspiration precautions  HOB >45 during po intake, remain >30 for 30-45 minutes after po   Small bites/sips; alternate liquid/solid with slow feeding rate   Oral care TID  Meds in puree   Outcome: Progressing Towards Goal    SPEECH LANGUAGE PATHOLOGY BEDSIDE SWALLOW EVALUATION/TREATMENT    Patient: Ellen Contreras (57 y.o. female)  Date: 2/28/2020  Primary Diagnosis: Severe sepsis ([address] [A41.9, R65.20]  UTI (urinary tract infection) [N39.0]  ARF (acute renal failure) ([facility]) [N17.9]  UTI (urinary tract infection) [N39.0]        Precautions: aspiration    PLOF: As per H&P    ASSESSMENT :  Based on the objective data described below, the patient presents with mild-mod oral dysphagia. Per daughter: pt has been consuming mech soft diet prior to admission with limited intake. This PM, she tolerated reg solid, puree, and thin liquids via cup/sip without any overt s/sx of aspiration. Labored bolus manipulation of solids observed with positive oral clearance. Pt unable to form labial seal for straw trials, but was accepting to cup/sip trials without difficulty. Laryngeal elevation was functional/timely to palpation. Recommend mech soft diet with thin liquids, aspiration precautions, oral care TID, and meds crushed in puree. Rec no straws. She will require assistance with PO. Contacted RD regarding magic cups and ensure for patient.  ST will continue to follow x 1-2 more visits to ensure safety of PO.    TREATMENT :  Skilled therapy initiated; Educated pt 's daughter on aspiration precautions and importance of compensatory swallow techniques to decrease aspiration risk (decrease rate of intake & sip/bite size, upright @HOB for all po intake and ~30 minutes after po); verbalized comprehension. Patient will benefit from skilled intervention to address the above impairments. Patient's rehabilitation potential is considered to be Fair  Factors which may influence rehabilitation potential include:   [x]            Mental ability/status  [x]            Medical condition     PLAN :  Recommendations and Planned Interventions: see above  Frequency/Duration: Patient will be followed by speech-language pathology x 1-2 more visits to address goals. Discharge Recommendations: Ian Wolff and To Be Determined     SUBJECTIVE:   Patient stated That's cold. - referring to ice water    OBJECTIVE:     Past Medical History:   Diagnosis Date    Anticoagulated on Coumadin     Anticoagulation goal of INR 2 to 3     Decreased calculated glomerular filtration rate (GFR) 8/1/2016    Calculated GFR equivalent to that of CKD stage 3 = 30-59 ml/min    Dementia (HCC)     DVT (deep venous thrombosis) (HCC)     Dyslipidemia     Essential hypertension     Glaucoma     Osteoporosis     Postoperative anemia due to acute blood loss 7/29/2016   No past surgical history on file. Diet prior to admission: mech soft with thin  Current Diet:  mech soft with thin, no straws    Cognitive and Communication Status:  Neurologic State: Confused, Drowsy  Orientation Level: Oriented to person, Disoriented to time, Disoriented to situation, Disoriented to place  Cognition: Decreased command following  Oral Assessment:  Oral Assessment  Labial: Decreased rate;Decreased seal  Dentition: Natural;Limited  Oral Hygiene: adequate  Lingual: Decreased rate;Decreased strength  Velum: No impairment  Mandible: No impairment  P.O. Trials:  Patient Position: [facility] at [facility]  Vocal quality prior to P.O.: Low volume  Consistency Presented: Thin liquid;Puree; Solid  How Presented: SLP-fed/presented;Cup/sip;Spoon;Straw  Bolus Acceptance: Impaired  Bolus Formation/Control: Impaired  Type of Impairment: Anterior;Delayed;Poor;Posterior;Mastication; Incomplete  Propulsion: Delayed (# of seconds)  Oral Residue: None  Initiation of Swallow: No impairment  Laryngeal Elevation: Functional  Aspiration Signs/Symptoms: None  Pharyngeal Phase Characteristics: No impairment, issues, or problems   Effective Modifications: Small sips and bites; Alternate liquids/solids  Cues for Modifications: Moderate     Oral Phase Severity: Mild-moderate  Pharyngeal Phase Severity : No impairment    PAIN:  Start of Eval: 0  End of Eval: 0     After treatment:   []            Patient left in no apparent distress sitting up in chair  [x]            Patient left in no apparent distress in bed  [x]            Call bell left within reach  [x]            Nursing notified  [x]            Family present  []            Caregiver present  []            Bed alarm activated    COMMUNICATION/EDUCATION:   [x]            Aspiration precautions; swallow safety; compensatory techniques. [x]            Patient/family have participated as able in goal setting and plan of care. [x]         Posted safety precautions in patient's room.     Thank you for this referral.    Petra Granda M.S. CCC-SLP/L  Speech-Language Pathologist

## 2020-02-29 NOTE — PROGRESS NOTES
In Patient Progress note    Admit Date: 2/27/2020    Impression:     #1 PATTI due to prerenal azotemia in the setting of poor intake/dehydration due to diarrhea /UTI  #2 sepsis/sirs  #3 gram-negative rods UTI  #4 hypertension uremic metabolic acidosis   #5 lactic acidosis, improved  #6 altered mental status  #7 dementia  #8 hypernatremia  #9 Probable saccular aneurysm infrarenal     Patient's volume status appears to have improved. Hyponatremia is also improving. Renal parameters  Are improving and creatinine is down to 1.2. Incontinent so ins and outs are not accurate. Potassium is low at 3.1.   Still looks lethargic with poor intake    Plan:  #1 D5W @ 75 cc/hrs , follow sodium daily  #2 renal panel in AM  #3 continue to hold her lisinopril and metoprolol, okay to keep blood pressures in 130s to 684 range systolic  #4 avoid nephrotoxins and NSAIDs  #5 follow vascular recommendations, I would hold off in CTA until creat < 1   #6 renally dose medications and antibiotics  #7 check bladder scans frequently to monitor for retention  #8 replace K     Please call with questions    Ric Joiner MD FASN  Cell [phone]  Pager: [phone]     Subjective:     Wakes up but goes back to sleep   Lethargic       Current Facility-Administered Medications:     potassium chloride 10 mEq, lidocaine (PF) (XYLOCAINE) 10 mg/mL (1 %) 1 mL in dextrose 5% 100 mL IVPB, , IntraVENous, Q1H, Kip Hannah MD    ferrous sulfate 300 mg (60 mg iron)/5 mL oral syrup 300 mg, 300 mg, Oral, PCL, Sophia Miller MD    [Held by provider] atenoloL (TENORMIN) tablet 25 mg, 25 mg, Oral, DAILY, Sophia Miller MD    ciprofloxacin HCl (CIPRO) tablet 500 mg, 500 mg, Oral, DAILY, Sophia Miller MD, 500 mg at 02/29/20 1052    sodium bicarbonate tablet 650 mg, 650 mg, Oral, BID, Edwardo Hannah MD    latanoprost (XALATAN) 0.005 % ophthalmic solution 1 Drop, 1 Drop, Both Eyes, QPM, Juwan Sharpe MD, 1 Drop at 02/28/20 1953    memantine ([address] tablet 10 mg, 10 mg, Oral, DAILY, Zane García MD, 10 mg at 02/29/20 1036    apixaban (ELIQUIS) tablet 2.5 mg, 2.5 mg, Oral, BID, Zane García MD, 2.5 mg at 02/29/20 1036    traMADol (ULTRAM) tablet 50 mg, 50 mg, Oral, Q6H PRN, Markie Lai MD    polyethylene glycol (MIRALAX) packet 17 g, 17 g, Oral, DAILY, Sophia Cordero MD, 17 g at 02/29/20 1036    bisacodyL (DULCOLAX) suppository 10 mg, 10 mg, Rectal, DAILY PRN, Markie Lai MD    oxyCODONE IR (ROXICODONE) tablet 2.5 mg, 2.5 mg, Oral, Q6H PRN, Zane García MD    pantoprazole (PROTONIX) tablet 40 mg, 40 mg, Oral, ACB&D, Markie Lai MD, 40 mg at 02/29/20 1036    dextrose 5% infusion, 75 mL/hr, IntraVENous, CONTINUOUS, Bic, Edwardo PALACIOS MD, Last Rate: 75 mL/hr at 02/29/20 1009, 75 mL/hr at 02/29/20 1009    sodium chloride (NS) flush 5-10 mL, 5-10 mL, IntraVENous, PRN, Zane García MD    acetaminophen (TYLENOL) tablet 650 mg, 650 mg, Oral, Q6H PRN, Zane García MD, 650 mg at 02/27/20 2008    ondansetron [facility]) injection 4 mg, 4 mg, IntraVENous, Q6H PRN, Zane García MD        Objective:     Visit Vitals  /54 (BP 1 Location: Right arm, BP Patient Position: At rest)   Pulse 83   Temp 97.4 °F (36.3 °C)   Resp 18   Ht 5' (1.524 m)   Wt 63.5 kg (140 lb)   LMP  (LMP Unknown)   SpO2 96%   BMI 27.34 kg/m²         Intake/Output Summary (Last 24 hours) at 2/29/2020 1058  Last data filed at 2/29/2020 0930  Gross per 24 hour   Intake 0 ml   Output    Net 0 ml       Physical Exam:     Patient is in no apparent distress. HEENT: moist mucosa   Lungs: good air entry, clear to auscultation bilaterally  Cardiovascular system: S1, S2, regular rate and rhythm. Abdomen: soft, non tender, non distended. BS+  Extremities: no clubbing, cyanosis or edema. Integumentary: skin is grossly intact.    Neurologic: awake , not oriented , lethargic     Data Review:    Recent Labs     02/28/20  0309   WBC 11.2   RBC 3.91*   HCT 34.9*   MCV 89.3 MCH 28.6   MCHC 32.1   RDW 16.2*     Recent Labs     02/29/20  0136 02/28/20  1910 02/28/20  0309 02/27/20  1430 02/27/20 0139   BUN 55* 60* 74* 81* 70*   CREA 1.20 1.36* 1.82* 2.35* 2.30*   CA 7.8* 7.6* 7.8* 9.6 10.5*   ALB 2.0* 2.0* 2.2* 2.8* 3.4   K 3.1* 3.5 3.8 3.9 3.2*   * 150* 149* 146* 142   * 123* 121* 111 107   CO2 18* 20* 20* 22 19*   PHOS 1.4* 1.9*  --   --   --    GLU 94 88 83 157* 198*       Ignacio Lozano MD

## 2020-02-29 NOTE — PROGRESS NOTES
Noted that sodium upto 150 meq /dl ,   Change IVF to D5w@ 100 cc/hrs   BMP in AM    Please call with questions,    Waldo Mistry MD FASN  Cell [phone]  Pager: [phone]

## 2020-02-29 NOTE — ROUTINE PROCESS
Bedside and Verbal shift change report given to RADHA Frias (oncoming nurse) by Thierno العلي RN (offgoing nurse). Report included the following information SBAR, Kardex and MAR.

## 2020-02-29 NOTE — PROGRESS NOTES
Problem: Pressure Injury - Risk of  Goal: *Prevention of pressure injury  Description  Document Rosas Scale and appropriate interventions in the flowsheet. Outcome: Progressing Towards Goal  Note: Pressure Injury Interventions:  Sensory Interventions: Assess changes in LOC    Moisture Interventions: Absorbent underpads    Activity Interventions: Pressure redistribution bed/mattress(bed type)    Mobility Interventions: HOB 30 degrees or less    Nutrition Interventions: Document food/fluid/supplement intake    Friction and Shear Interventions: Apply protective barrier, creams and emollients                Problem: Patient Education: Go to Patient Education Activity  Goal: Patient/Family Education  Outcome: Progressing Towards Goal     Problem: Falls - Risk of  Goal: *Absence of Falls  Description  Document Dennys Fall Risk and appropriate interventions in the flowsheet.   Outcome: Progressing Towards Goal  Note: Fall Risk Interventions:       Mentation Interventions: Adequate sleep, hydration, pain control, Bed/chair exit alarm    Medication Interventions: Bed/chair exit alarm    Elimination Interventions: Bed/chair exit alarm, Call light in reach    History of Falls Interventions: Bed/chair exit alarm         Problem: Patient Education: Go to Patient Education Activity  Goal: Patient/Family Education  Outcome: Progressing Towards Goal     Problem: Nutrition Deficit  Goal: *Optimize nutritional status  Outcome: Progressing Towards Goal     Problem: Patient Education: Go to Patient Education Activity  Goal: Patient/Family Education  Outcome: Progressing Towards Goal

## 2020-02-29 NOTE — PROGRESS NOTES
Problem: Pressure Injury - Risk of  Goal: *Prevention of pressure injury  Description  Document Rosas Scale and appropriate interventions in the flowsheet. Outcome: Progressing Towards Goal  Note: Pressure Injury Interventions:  Sensory Interventions: Assess changes in LOC, Check visual cues for pain    Moisture Interventions: Absorbent underpads, Check for incontinence Q2 hours and as needed    Activity Interventions: Pressure redistribution bed/mattress(bed type)    Mobility Interventions: HOB 30 degrees or less, Pressure redistribution bed/mattress (bed type)    Nutrition Interventions: Document food/fluid/supplement intake    Friction and Shear Interventions: HOB 30 degrees or less         Problem: Falls - Risk of  Goal: *Absence of Falls  Description  Document Dennys Fall Risk and appropriate interventions in the flowsheet.   Outcome: Progressing Towards Goal  Note: Fall Risk Interventions:       Mentation Interventions: Bed/chair exit alarm, Door open when patient unattended, Adequate sleep, hydration, pain control    Medication Interventions: Bed/chair exit alarm    Elimination Interventions: Call light in reach, Bed/chair exit alarm, Toileting schedule/hourly rounds    History of Falls Interventions: Door open when patient unattended, Bed/chair exit alarm

## 2020-02-29 NOTE — ROUTINE PROCESS
Bedside and Verbal shift change report given to Neri RN (oncoming nurse) by Dianna Mcelroy RN (offgoing nurse). Report included the following information SBAR, Kardex, Intake/Output, MAR and Recent Results.

## 2020-02-29 NOTE — PROGRESS NOTES
Patient is not available to be assessed at this time. Family Support.     [id number] [facility].   [phone]

## 2020-02-29 NOTE — PROGRESS NOTES
Hospitalist Progress Note    Patient: Ronaldo Vogel Age: 80 y.o. : 1926 MR#: [id number] SSN: [id number]  Date/Time: 2020 9:43 AM    DOA: 2020  PCP: Stacia, MD Julio    Subjective:     No overnight event. Tolerated IV fluid, Nephrology started D5w. Renal function improved. BP is still on lower side, has been holding her antihypertensive medications. Urine culture with E.Coli, has been on cefepime  Note: Vascular recommendation, spoke with nephrology for CTA on Monday    Wound care concern for right heal ulceration POA, ?pressure ulcer     ROS: no fever/chills, no headache, no dizziness, no facial pain, no sinus congestion, +cough  No swallowing pain, No chest pain, no palpitation, no shortness of breath, + abd pain,  No diarrhea, no urinary complaint, no leg pain or swelling    Assessment/Plan:     1. Sepsis POA (leukocytosis, tachypnea, bandemia, UTI)  2. E.Coli UTI   3. Leukocytosis, bandemia, resolved   4. PATTI likely associated with sepsis vs hypovolemia, resolved  5. Metabolic acidosis with PATTI, with lactic acidosis associate with sepsis  6. Hypernatremia, improving   7. Hypokalemia   8. Indeterminate troponin elevated, likely in setting of PATTI, non cardiac   9. Distal esophagus thickening, reflux esophagitis   10. Saccular aneurysm infrarenal abdominal aorta vs dissection (chronic)  11. Right heal ulceration, pressure ulcer POA   12.  H/o DVT on Eliquis   13. Hypertension, stable   14. Dementia   15. Dyslipidemia   16. Cough with swallowing, moderate dysphagia  17. Iron deficiency anemia       Continue with IV fluid hydration, Nephrology follows, OK for CTA to evaluate Saccular aneurysm on /Monday  Avoid nephrotoxic medications.    Change cefepime to Ciprofloxacin BID for 5 days   Vascular consult appreciated   She is on Eliquis, monitor   Hold home antihypertensive (hold atenolol, lisinopril _these will need to be adjusted at discharge)  Speech eval appreciated  Podiatry follow up for right heal pressure wound   On oral iron (can switch to lunch time)  PPI BID, replace electrolytes   ICS.      Spoke with her daughter and updated clinical management     DNR    Additional Notes:    Time spent >30 minutes    Case discussed with:  [x]Patient  [x]Family  [x]Nursing  [x]Case Management  DVT Prophylaxis:  []Lovenox  []Hep SQ  [x]Eliquis  []Coumadin   []On Heparin gtt    Signed By: Lorenza Litten, MD     2020 9:43 AM              Objective:   VS:   Visit Vitals  /54 (BP 1 Location: Right arm, BP Patient Position: At rest)   Pulse 83   Temp 97.4 °F (36.3 °C)   Resp 18   Ht 5' (1.524 m)   Wt 63.5 kg (140 lb)   LMP  (LMP Unknown)   SpO2 96%   BMI 27.34 kg/m²      Tmax/24hrs: Temp (24hrs), Av.8 °F (36.6 °C), Min:97.4 °F (36.3 °C), Max:98.2 °F (36.8 °C)      Intake/Output Summary (Last 24 hours) at 2020 0943  Last data filed at 2020 0930  Gross per 24 hour   Intake 0 ml   Output    Net 0 ml     Tele:   General:  Cooperative, Not in acute distress, speaks in short sentence while in bed  HEENT: PERRL, EOMI, supple neck, no JVD, dry oral mucosa  Cardiovascular: S1S2 regular, no rub/gallop   Pulmonary: Clear air entry bilaterally, no wheezing, no crackle  GI:  Soft, + tender, non distended, +bs, no guarding   Extremities:  No pedal edema, +distal pulses appreciated   Right heel with pressure ulcer, non-infected   Neuro: AOx3, moving all extremities    Additional:       Current Facility-Administered Medications   Medication Dose Route Frequency    potassium chloride 10 mEq, lidocaine (PF) (XYLOCAINE) 10 mg/mL (1 %) 1 mL in dextrose 5% 100 mL IVPB   IntraVENous Q1H    ferrous sulfate 300 mg (60 mg iron)/5 mL oral syrup 300 mg  300 mg Oral DAILY WITH BREAKFAST    latanoprost (XALATAN) 0.005 % ophthalmic solution 1 Drop  1 Drop Both Eyes QPM    memantine (NAMENDA) tablet 10 mg  10 mg Oral DAILY    apixaban (ELIQUIS) tablet 2.5 mg  2.5 mg Oral BID    traMADol (ULTRAM) tablet 50 mg  50 mg Oral Q6H PRN    polyethylene glycol (MIRALAX) packet 17 g  17 g Oral DAILY    bisacodyL (DULCOLAX) suppository 10 mg  10 mg Rectal DAILY PRN    oxyCODONE IR (ROXICODONE) tablet 2.5 mg  2.5 mg Oral Q6H PRN    pantoprazole (PROTONIX) tablet 40 mg  40 mg Oral ACB&D    dextrose 5% infusion  100 mL/hr IntraVENous CONTINUOUS    sodium chloride (NS) flush 5-10 mL  5-10 mL IntraVENous PRN    cefepime (MAXIPIME) 2 g in sterile water (preservative free) 10 mL IV syringe  2 g IntraVENous Q24H    acetaminophen (TYLENOL) tablet 650 mg  650 mg Oral Q6H PRN    ondansetron (ZOFRAN) injection 4 mg  4 mg IntraVENous Q6H PRN            Lab/Data Review:  Labs: Results:       Chemistry Recent Labs     02/29/20 0136 02/28/20 1910 02/28/20 0309   GLU 94 88 83   * 150* 149*   K 3.1* 3.5 3.8   * 123* 121*   CO2 18* 20* 20*   BUN 55* 60* 74*   CREA 1.20 1.36* 1.82*   BUCR 46* 44* 41*   AGAP 8 7 8   CA 7.8* 7.6* 7.8*   PHOS 1.4* 1.9*  --      Recent Labs     02/29/20 0136 02/28/20 1910 02/28/20 0309 02/27/20  1430 02/27/20 0139   ALT  --   --  14 18 21   SGOT  --   --  22 25 22   TP  --   --  5.4* 6.9 8.2   ALB 2.0* 2.0* 2.2* 2.8* 3.4   GLOB  --   --  3.2 4.1* 4.8*   AGRAT  --   --  0.7* 0.7* 0.7*      CBC w/Diff Recent Labs     02/28/20 0309 02/27/20 1430   WBC 11.2 20.6*   RBC 3.91* 4.77   HGB 11.2* 13.9   HCT 34.9* 42.1   MCV 89.3 88.3   MCH 28.6 29.1   MCHC 32.1 33.0   RDW 16.2* 15.7*    263   BANDS  --  9*   GRANS  --  84*   LYMPH  --  3*   EOS  --  0      Coagulation Recent Labs     02/28/20  0309   APTT 34.5       Iron/Ferritin Lab Results   Component Value Date/Time    Iron 25 (L) 07/31/2016 01:35 AM    TIBC 201 (L) 07/31/2016 01:35 AM    Iron % saturation 12 07/31/2016 01:35 AM    Ferritin 121 07/31/2016 01:35 AM       BNP    Cardiac Enzymes Lab Results   Component Value Date/Time    CK 47 01/08/2020 07:35 PM    CK - MB <1.0 01/08/2020 07:35 PM    CK-MB Index  01/08/2020 07:35 PM     CALCULATION NOT PERFORMED WHEN RESULT IS BELOW LINEAR LIMIT    Troponin-I, QT 0.07 (H) 02/27/2020 02:30 PM        Lactic Acid    Thyroid Studies          All Micro Results     Procedure Component Value Units Date/Time    CULTURE, URINE [[id number]]  (Abnormal)  (Susceptibility) Collected:  02/27/20 1720    Order Status:  Completed Specimen:  Clean catch Updated:  02/29/20 3923     Special Requests: NO SPECIAL REQUESTS        Culture result:       [id number] COLONIES/mL ESCHERICHIA COLI                  [id number]  COLONIES/mL  MIXED GRAM POSITIVE CAROL, PROBABLE SKIN/GENITAL CONTAMINATION. CULTURE, BLOOD [[id number]] Collected:  02/27/20 1558    Order Status:  Completed Specimen:  Blood Updated:  02/29/20 0627     Special Requests: NO SPECIAL REQUESTS        Culture result: NO GROWTH 2 DAYS       CULTURE, BLOOD [[id number]] Collected:  02/27/20 1613    Order Status:  Completed Specimen:  Blood Updated:  02/29/20 0627     Special Requests: NO SPECIAL REQUESTS        Culture result: NO GROWTH 2 DAYS               Images:    CT (Most Recent). CT Results (most recent):  Results from Hospital Encounter encounter on 02/27/20   CT ABD PELV WO CONT    Narrative EXAM:  CT Abdomen-Pelvis without Contrast.    CLINICAL INDICATION:  Sent in from nursing home due to abdominal pain with  nausea and vomiting for 2 days. Sustained a fall yesterday. Dark diarrhea. Abnormal urinalysis. Acute renal failure. Severe sepsis. COMPARISON:  None    TECHNIQUE:      - Helical volumetric CT imaging of the abdomen and pelvis is performed without  IV contrast administration.   Coronal and sagittal multiplanar reconstruction  images are generated for improved anatomic delineation.  - All CT scans at this facility are performed using dose optimization technique  as appropriate to the performed exam, to include automated exposure control,  adjustment of the mA and/or kV according to patient's size (Including  appropriate matching for site-specific examinations), or use of iterative  reconstruction technique. FINDINGS:    Lung Bases:  No airspace opacities. Liver, Adrenal Glands, Pancreas:  Unremarkable. Gallbladder:  Distended gallbladder. Small gallstone located near the  gallbladder neck measuring about 0.2 cm (axial #21). Spleen:  Although the spleen itself is unremarkable, there is a dense calcific  structure immediately adjacent to the splenic hilar region, measuring about 1.9  cm in diameter. Kidneys:  Fat attenuation nodule in the right kidney, measuring about 1.0 x 0.9  cm (axial #22). The left kidney reveals an exophytic hypodensity located near  the lower pole, measuring about 1.6 x 1.4 cm (axial #31). Another hypodensity  is identified in the left kidney measuring about 0.9 x 0.67 m (axial #27). GI Tract: Thickened distal esophagus. The stomach appears grossly  unremarkable. No acute small bowel abnormalities are noted. No acute small  bowel obstruction. The appendix is not confidently visualized. No convincing  evidence of acute appendicitis is detected. No acute colitis or acute  diverticulitis is detected. Severe diverticulosis coli. Moderate stool  retention in the rectum. Nodes:  No mesenteric or retroperitoneal adenopathy. Vascular: Moderate to pronounced atheromatous plaque calcifications. There is  suggestion of a saccular aneurysm in the infrarenal abdominal aorta, with  apparent partially calcified intimal flap protruding into the lumen (axial #29). Because of the this structure being visualized only on 2 or 3 consecutive  slices, aortic dissection is felt to be unlikely but cannot be entirely  excluded. At the level of this probable saccular aneurysm, the aorta measures  about 2.4 x 2.3 cm in diameter. Bladder:  Secondary to the right hip joint replacement prosthetic component  producing severe Beam scatter artifact, details evaluation of the bladder is  difficult.   This is further complicated by the hardware from the previous ORIF  of the left hip. Within the technical limitations of the study, the  underdistended bladder appears grossly unremarkable. Uterus:  Small fibroid suggested along the ventral aspect of the uterus. Adnexa:  No adnexal mass. Bones:  No acute bony abnormalities. Status post right hip joint replacement  and ORIF of the left proximal femur. Impression IMPRESSION:    1. Abnormally thickened appearance of the distal esophagus. Possibly from  reflux esophagitis vs. artifact from hiatal hernia. No acute findings along the  remainder of the gastrointestinal tract. 2.  Distended gallbladder with a small gallstone. 3.  Renal hypodensities. The right renal hypodensity probably corresponds to a  renal cyst.  The left renal hypodensity is of fat attenuation and probably  implies an angiomyolipoma. 4.  Possible saccular aneurysm involving the infrarenal abdominal aorta vs. much  less likely short dissection. If the dissection is indeed present, it is quite  limited in terms of length and is probably of chronic nature. XRAY (Most Recent)      EKG No results found for this or any previous visit.      2D ECHO

## 2020-03-01 ENCOUNTER — APPOINTMENT (OUTPATIENT)
Dept: CT IMAGING | Age: 85
DRG: 872 | End: 2020-03-01
Attending: PHYSICIAN ASSISTANT
Payer: MEDICARE

## 2020-03-01 LAB
ANION GAP SERPL CALC-SCNC: 8 MMOL/L (ref 3–18)
BUN SERPL-MCNC: 29 MG/DL (ref 7–18)
BUN/CREAT SERPL: 35 (ref 12–20)
CALCIUM SERPL-MCNC: 8 MG/DL (ref 8.5–10.1)
CHLORIDE SERPL-SCNC: 115 MMOL/L (ref 100–111)
CO2 SERPL-SCNC: 17 MMOL/L (ref 21–32)
CREAT SERPL-MCNC: 0.83 MG/DL (ref 0.6–1.3)
ERYTHROCYTE [DISTWIDTH] IN BLOOD BY AUTOMATED COUNT: 16.6 % (ref 11.6–14.5)
GLUCOSE SERPL-MCNC: 79 MG/DL (ref 74–99)
HCT VFR BLD AUTO: 31.9 % (ref 35–45)
HGB BLD-MCNC: 10.5 G/DL (ref 12–16)
MCH RBC QN AUTO: 29.2 PG (ref 24–34)
MCHC RBC AUTO-ENTMCNC: 32.9 G/DL (ref 31–37)
MCV RBC AUTO: 88.9 FL (ref 74–97)
PLATELET # BLD AUTO: 162 K/UL (ref 135–420)
PMV BLD AUTO: 11.9 FL (ref 9.2–11.8)
POTASSIUM SERPL-SCNC: 3.6 MMOL/L (ref 3.5–5.5)
RBC # BLD AUTO: 3.59 M/UL (ref 4.2–5.3)
SODIUM SERPL-SCNC: 140 MMOL/L (ref 136–145)
WBC # BLD AUTO: 7 K/UL (ref 4.6–13.2)

## 2020-03-01 PROCEDURE — 74011250637 HC RX REV CODE- 250/637: Performed by: HOSPITALIST

## 2020-03-01 PROCEDURE — 74011250637 HC RX REV CODE- 250/637: Performed by: INTERNAL MEDICINE

## 2020-03-01 PROCEDURE — 85027 COMPLETE CBC AUTOMATED: CPT

## 2020-03-01 PROCEDURE — 80048 BASIC METABOLIC PNL TOTAL CA: CPT

## 2020-03-01 PROCEDURE — 65660000000 HC RM CCU STEPDOWN

## 2020-03-01 PROCEDURE — 74011000258 HC RX REV CODE- 258: Performed by: INTERNAL MEDICINE

## 2020-03-01 PROCEDURE — 77030038269 HC DRN EXT URIN PURWCK BARD -A

## 2020-03-01 PROCEDURE — 36415 COLL VENOUS BLD VENIPUNCTURE: CPT

## 2020-03-01 RX ORDER — POTASSIUM CHLORIDE 20 MEQ/1
40 TABLET, EXTENDED RELEASE ORAL
Status: COMPLETED | OUTPATIENT
Start: 2020-03-01 | End: 2020-03-01

## 2020-03-01 RX ORDER — SODIUM CHLORIDE 450 MG/100ML
50 INJECTION, SOLUTION INTRAVENOUS CONTINUOUS
Status: DISCONTINUED | OUTPATIENT
Start: 2020-03-01 | End: 2020-03-03 | Stop reason: HOSPADM

## 2020-03-01 RX ADMIN — SODIUM BICARBONATE 650 MG TABLET 650 MG: at 17:08

## 2020-03-01 RX ADMIN — POTASSIUM CHLORIDE 40 MEQ: 1500 TABLET, EXTENDED RELEASE ORAL at 12:59

## 2020-03-01 RX ADMIN — PANTOPRAZOLE SODIUM 40 MG: 40 TABLET, DELAYED RELEASE ORAL at 16:14

## 2020-03-01 RX ADMIN — APIXABAN 2.5 MG: 2.5 TABLET, FILM COATED ORAL at 09:20

## 2020-03-01 RX ADMIN — CIPROFLOXACIN HYDROCHLORIDE 500 MG: 500 TABLET, FILM COATED ORAL at 09:20

## 2020-03-01 RX ADMIN — LATANOPROST 1 DROP: 50 SOLUTION OPHTHALMIC at 17:09

## 2020-03-01 RX ADMIN — POLYETHYLENE GLYCOL 3350 17 G: 17 POWDER, FOR SOLUTION ORAL at 09:20

## 2020-03-01 RX ADMIN — ACETAMINOPHEN 650 MG: 325 TABLET ORAL at 12:59

## 2020-03-01 RX ADMIN — APIXABAN 2.5 MG: 2.5 TABLET, FILM COATED ORAL at 17:08

## 2020-03-01 RX ADMIN — SODIUM CHLORIDE 50 ML/HR: 450 INJECTION, SOLUTION INTRAVENOUS at 15:10

## 2020-03-01 RX ADMIN — PANTOPRAZOLE SODIUM 40 MG: 40 TABLET, DELAYED RELEASE ORAL at 06:31

## 2020-03-01 RX ADMIN — MEMANTINE HYDROCHLORIDE 10 MG: 10 TABLET ORAL at 09:21

## 2020-03-01 RX ADMIN — SODIUM BICARBONATE 650 MG TABLET 650 MG: at 09:20

## 2020-03-01 RX ADMIN — MINERAL SUPPLEMENT IRON 300 MG / 5 ML STRENGTH LIQUID 100 PER BOX UNFLAVORED 300 MG: at 12:51

## 2020-03-01 NOTE — PROGRESS NOTES
No family currently present  Cr is normal today and renal has cleared to be able to do cta to evaluate findings concerning for saccular aneurysm/possible dissection flap  cta ordered and will follow up pending results

## 2020-03-01 NOTE — PROGRESS NOTES
Hospitalist Progress Note    Patient: Dima Chapa Age: 80 y.o. : 1926 MR#: [id number] SSN: [id number]  Date/Time: 3/1/2020 9:43 AM    DOA: 2020  PCP: Stacia, MD Julio    Subjective:     No chest pain no shortness of breath no nausea vomiting diarrhea  Comfortable    Assessment/Plan:   49-year-old female admitted for sepsis present on admission, E. coli urinary tract infection, right heel ulceration and local infection. Patient also has acute renal failure from multiple factors-nephrology following. 1. Sepsis POA (leukocytosis, tachypnea, bandemia, UTI)  2. E.Coli UTI   3. Leukocytosis, bandemia, resolved   4. PATIT likely associated with sepsis vs hypovolemia, resolved  5. Metabolic acidosis with PATTI, with lactic acidosis associate with sepsis  6. Hypernatremia, improved  7. Hypokalemia -on replacement  8. Indeterminate troponin elevated, likely in setting of PATTI, non cardiac   9. Distal esophagus thickening, reflux esophagitis   10. Saccular aneurysm infrarenal abdominal aorta vs dissection (chronic)  11. Right heal ulceration, pressure ulcer POA   12.  H/o DVT on Eliquis   13. Hypertension, stable   14. Dementia   15. Dyslipidemia   16. Cough with swallowing, moderate dysphagia  17. Iron deficiency anemia     Plan  -Continue current medications and IV antibiotic, ID consultation in a.m.     -CTA-for evaluation of saccular aneurysm-recommended by vascular, cleared by nephrology    -Heel wound local wound care     -Discussed case with the patient's family in room with patient and patient    -Blood pressure medications are on hold due to hypotension-continue to monitor blood pressure        DNR    Additional Notes:    Time spent >30 minutes    Case discussed with:  [x]Patient  [x]Family  [x]Nursing  [x]Case Management  DVT Prophylaxis:  []Lovenox  []Hep SQ  [x]Eliquis  []Coumadin   []On Heparin gtt    Signed By: Chilo Gutierrez MD     2020 9:43 AM              Objective:   VS: Visit Vitals  /66 (BP 1 Location: Left arm, BP Patient Position: At rest)   Pulse 98   Temp 98.3 °F (36.8 °C)   Resp 18   Ht 5' (1.524 m)   Wt 63.5 kg (140 lb)   LMP  (LMP Unknown)   SpO2 95%   BMI 27.34 kg/m²      Tmax/24hrs: Temp (24hrs), Av.4 °F (36.9 °C), Min:97.8 °F (36.6 °C), Max:99.1 °F (37.3 °C)      Intake/Output Summary (Last 24 hours) at 3/1/2020 1405  Last data filed at 3/1/2020 0743  Gross per 24 hour   Intake 903.75 ml   Output 2200 ml   Net -1296.25 ml     Tele:   General:  Cooperative, Not in acute distress, speaks in short sentence while in bed  HEENT: PERRL, EOMI, supple neck, no JVD, dry oral mucosa  Cardiovascular: S1S2 regular, no rub/gallop   Pulmonary: Clear air entry bilaterally, no wheezing, no crackle  GI:  Soft, + tender, non distended, +bs, no guarding   Extremities:  No pedal edema, +distal pulses appreciated   Right heel with pressure ulcer, non-infected   Neuro: AOx3, moving all extremities    Additional:       Current Facility-Administered Medications   Medication Dose Route Frequency    0.45% sodium chloride infusion  50 mL/hr IntraVENous CONTINUOUS    ferrous sulfate 300 mg (60 mg iron)/5 mL oral syrup 300 mg  300 mg Oral PCL    [Held by provider] atenoloL (TENORMIN) tablet 25 mg  25 mg Oral DAILY    ciprofloxacin HCl (CIPRO) tablet 500 mg  500 mg Oral DAILY    sodium bicarbonate tablet 650 mg  650 mg Oral BID    latanoprost (XALATAN) 0.005 % ophthalmic solution 1 Drop  1 Drop Both Eyes QPM    memantine (NAMENDA) tablet 10 mg  10 mg Oral DAILY    apixaban (ELIQUIS) tablet 2.5 mg  2.5 mg Oral BID    traMADol (ULTRAM) tablet 50 mg  50 mg Oral Q6H PRN    polyethylene glycol (MIRALAX) packet 17 g  17 g Oral DAILY    bisacodyL (DULCOLAX) suppository 10 mg  10 mg Rectal DAILY PRN    oxyCODONE IR (ROXICODONE) tablet 2.5 mg  2.5 mg Oral Q6H PRN    pantoprazole (PROTONIX) tablet 40 mg  40 mg Oral ACB&D    sodium chloride (NS) flush 5-10 mL  5-10 mL IntraVENous PRN  acetaminophen (TYLENOL) tablet 650 mg  650 mg Oral Q6H PRN    ondansetron (ZOFRAN) injection 4 mg  4 mg IntraVENous Q6H PRN            Lab/Data Review:  Labs: Results:       Chemistry Recent Labs     03/01/20 0557 02/29/20 0136 02/28/20 1910   GLU 79 94 88    148* 150*   K 3.6 3.1* 3.5   * 122* 123*   CO2 17* 18* 20*   BUN 29* 55* 60*   CREA 0.83 1.20 1.36*   BUCR 35* 46* 44*   AGAP 8 8 7   CA 8.0* 7.8* 7.6*   PHOS  --  1.4* 1.9*     Recent Labs     02/29/20 0136 02/28/20 1910 02/28/20 0309 02/27/20  1430   ALT  --   --  14 18   SGOT  --   --  22 25   TP  --   --  5.4* 6.9   ALB 2.0* 2.0* 2.2* 2.8*   GLOB  --   --  3.2 4.1*   AGRAT  --   --  0.7* 0.7*      CBC w/Diff Recent Labs     03/01/20 0557 02/28/20 0309 02/27/20  1430   WBC 7.0 11.2 20.6*   RBC 3.59* 3.91* 4.77   HGB 10.5* 11.2* 13.9   HCT 31.9* 34.9* 42.1   MCV 88.9 89.3 88.3   MCH 29.2 28.6 29.1   MCHC 32.9 32.1 33.0   RDW 16.6* 16.2* 15.7*    210 263   BANDS  --   --  9*   GRANS  --   --  84*   LYMPH  --   --  3*   EOS  --   --  0      Coagulation Recent Labs     02/28/20 0309   APTT 34.5       Iron/Ferritin Lab Results   Component Value Date/Time    Iron 25 (L) 07/31/2016 01:35 AM    TIBC 201 (L) 07/31/2016 01:35 AM    Iron % saturation 12 07/31/2016 01:35 AM    Ferritin 121 07/31/2016 01:35 AM       BNP    Cardiac Enzymes Lab Results   Component Value Date/Time    CK 39 02/29/2020 01:36 AM    CK - MB 2.7 02/29/2020 01:36 AM    CK-MB Index 6.9 (H) 02/29/2020 01:36 AM    Troponin-I, QT 0.06 (H) 02/29/2020 01:36 AM        Lactic Acid    Thyroid Studies          All Micro Results     Procedure Component Value Units Date/Time    CULTURE, BLOOD [[id number]] Collected:  02/27/20 1613    Order Status:  Completed Specimen:  Blood Updated:  03/01/20 0627     Special Requests: NO SPECIAL REQUESTS        Culture result: NO GROWTH 3 DAYS       CULTURE, BLOOD [[id number]] Collected:  02/27/20 1558    Order Status:  Completed Specimen: Blood Updated:  03/01/20 0627     Special Requests: NO SPECIAL REQUESTS        Culture result: NO GROWTH 3 DAYS       CULTURE, URINE [[id number]]  (Abnormal)  (Susceptibility) Collected:  02/27/20 1720    Order Status:  Completed Specimen:  Clean catch Updated:  02/29/20 9813     Special Requests: NO SPECIAL REQUESTS        Culture result:       [id number] COLONIES/mL ESCHERICHIA COLI                  [id number]  COLONIES/mL  MIXED GRAM POSITIVE CAROL, PROBABLE SKIN/GENITAL CONTAMINATION. Images:    CT (Most Recent). CT Results (most recent):  Results from Hospital Encounter encounter on 02/27/20   CT ABD PELV WO CONT    Narrative EXAM:  CT Abdomen-Pelvis without Contrast.    CLINICAL INDICATION:  Sent in from nursing home due to abdominal pain with  nausea and vomiting for 2 days. Sustained a fall yesterday. Dark diarrhea. Abnormal urinalysis. Acute renal failure. Severe sepsis. COMPARISON:  None    TECHNIQUE:      - Helical volumetric CT imaging of the abdomen and pelvis is performed without  IV contrast administration. Coronal and sagittal multiplanar reconstruction  images are generated for improved anatomic delineation.  - All CT scans at this facility are performed using dose optimization technique  as appropriate to the performed exam, to include automated exposure control,  adjustment of the mA and/or kV according to patient's size (Including  appropriate matching for site-specific examinations), or use of iterative  reconstruction technique. FINDINGS:    Lung Bases:  No airspace opacities. Liver, Adrenal Glands, Pancreas:  Unremarkable. Gallbladder:  Distended gallbladder. Small gallstone located near the  gallbladder neck measuring about 0.2 cm (axial #21). Spleen:  Although the spleen itself is unremarkable, there is a dense calcific  structure immediately adjacent to the splenic hilar region, measuring about 1.9  cm in diameter.     Kidneys:  Fat attenuation nodule in the right kidney, measuring about 1.0 x 0.9  cm (axial #22). The left kidney reveals an exophytic hypodensity located near  the lower pole, measuring about 1.6 x 1.4 cm (axial #31). Another hypodensity  is identified in the left kidney measuring about 0.9 x 0.67 m (axial #27). GI Tract: Thickened distal esophagus. The stomach appears grossly  unremarkable. No acute small bowel abnormalities are noted. No acute small  bowel obstruction. The appendix is not confidently visualized. No convincing  evidence of acute appendicitis is detected. No acute colitis or acute  diverticulitis is detected. Severe diverticulosis coli. Moderate stool  retention in the rectum. Nodes:  No mesenteric or retroperitoneal adenopathy. Vascular: Moderate to pronounced atheromatous plaque calcifications. There is  suggestion of a saccular aneurysm in the infrarenal abdominal aorta, with  apparent partially calcified intimal flap protruding into the lumen (axial #29). Because of the this structure being visualized only on 2 or 3 consecutive  slices, aortic dissection is felt to be unlikely but cannot be entirely  excluded. At the level of this probable saccular aneurysm, the aorta measures  about 2.4 x 2.3 cm in diameter. Bladder:  Secondary to the right hip joint replacement prosthetic component  producing severe Beam scatter artifact, details evaluation of the bladder is  difficult. This is further complicated by the hardware from the previous ORIF  of the left hip. Within the technical limitations of the study, the  underdistended bladder appears grossly unremarkable. Uterus:  Small fibroid suggested along the ventral aspect of the uterus. Adnexa:  No adnexal mass. Bones:  No acute bony abnormalities. Status post right hip joint replacement  and ORIF of the left proximal femur. Impression IMPRESSION:    1. Abnormally thickened appearance of the distal esophagus.   Possibly from  reflux esophagitis vs. artifact from hiatal hernia. No acute findings along the  remainder of the gastrointestinal tract. 2.  Distended gallbladder with a small gallstone. 3.  Renal hypodensities. The right renal hypodensity probably corresponds to a  renal cyst.  The left renal hypodensity is of fat attenuation and probably  implies an angiomyolipoma. 4.  Possible saccular aneurysm involving the infrarenal abdominal aorta vs. much  less likely short dissection. If the dissection is indeed present, it is quite  limited in terms of length and is probably of chronic nature. XRAY (Most Recent)      EKG No results found for this or any previous visit.      2D ECHO

## 2020-03-01 NOTE — PROGRESS NOTES
Problem: Pressure Injury - Risk of  Goal: *Prevention of pressure injury  Description  Document Rosas Scale and appropriate interventions in the flowsheet. Outcome: Progressing Towards Goal  Note: Pressure Injury Interventions:  Sensory Interventions: Assess changes in LOC    Moisture Interventions: Absorbent underpads    Activity Interventions: Pressure redistribution bed/mattress(bed type)    Mobility Interventions: Pressure redistribution bed/mattress (bed type), HOB 30 degrees or less    Nutrition Interventions: Document food/fluid/supplement intake    Friction and Shear Interventions: Apply protective barrier, creams and emollients, Minimize layers                Problem: Falls - Risk of  Goal: *Absence of Falls  Description  Document Dennys Fall Risk and appropriate interventions in the flowsheet.   Outcome: Progressing Towards Goal  Note: Fall Risk Interventions:       Mentation Interventions: Adequate sleep, hydration, pain control    Medication Interventions: Bed/chair exit alarm    Elimination Interventions: Call light in reach, Patient to call for help with toileting needs    History of Falls Interventions: Bed/chair exit alarm, Room close to nurse's station

## 2020-03-01 NOTE — PROGRESS NOTES
Bedside and Verbal shift change report given to RADHA Frias (oncoming nurse) by Sandy Brody (offgoing nurse). Report included the following information SBAR.

## 2020-03-01 NOTE — PROGRESS NOTES
Bedside and Verbal shift change report given to Kateryna Sexton (oncoming nurse) by Gillermina Barthel, RN   (offgoing nurse). Report given with SBAR, Kardex, MAR and Recent Results.

## 2020-03-01 NOTE — PROGRESS NOTES
In Patient Progress note    Admit Date: 2/27/2020    Impression:     #1 PATTI due to prerenal azotemia in the setting of poor intake/dehydration due to diarrhea /UTI  #2 sepsis/sirs d/t UTI   #3 Ecoli  UTI  #4 hypertension uremic metabolic acidosis   #5 lactic acidosis, improved  #6 altered mental status  #7 dementia  #8 hypernatremia better  #9 Probable saccular aneurysm infrarenal     Plan:  #1 switch IVF to 1/2 NS @ 50 cc/hrs   #2 renal panel in AM  #3 continue to hold her lisinopril and metoprolol,   okay to keep blood pressures in 130s to 416 range systolic  #4 avoid nephrotoxins and NSAIDs  #5 follow vascular recommendations, ok to get CTA now that creat < 1   Gentle hydration as above pre and post CTA   #6 renally dose medications and antibiotics  #7 replace K     Please call with questions,    Ashli Rice MD FASN  Cell [phone]  Pager: [phone]     Subjective:     Awake and alert   Denies SOB,CP      Current Facility-Administered Medications:     potassium bicarb-citric acid (EFFER-K) tablet 20 mEq, 20 mEq, Oral, BID, Jola Ormond, MD    ferrous sulfate 300 mg (60 mg iron)/5 mL oral syrup 300 mg, 300 mg, Oral, PCL, Sophia Munoz MD, 300 mg at 02/29/20 1308    [Held by provider] atenoloL (TENORMIN) tablet 25 mg, 25 mg, Oral, DAILY, Nicole Killian MD    ciprofloxacin HCl (CIPRO) tablet 500 mg, 500 mg, Oral, DAILY, Sophia Munoz MD, 500 mg at 03/01/20 0920    sodium bicarbonate tablet 650 mg, 650 mg, Oral, BID, Edwardo Hannah MD, 650 mg at 03/01/20 0920    latanoprost (XALATAN) 0.005 % ophthalmic solution 1 Drop, 1 Drop, Both Eyes, QPM, Spencer Barraza MD, 1 Drop at 02/29/20 1711    memantine (NAMENDA) tablet 10 mg, 10 mg, Oral, DAILY, Spencer Barraza MD, 10 mg at 03/01/20 1079    apixaban (ELIQUIS) tablet 2.5 mg, 2.5 mg, Oral, BID, Spencer Barraza MD, 2.5 mg at 03/01/20 0920    traMADol (ULTRAM) tablet 50 mg, 50 mg, Oral, Q6H PRN, Nicole Killian MD    polyethylene glycol [facility]) packet 17 g, 17 g, Oral, DAILY, Sophia Quinteros MD, 17 g at 03/01/20 0920    bisacodyL (DULCOLAX) suppository 10 mg, 10 mg, Rectal, DAILY PRN, Kristie Rojas MD    oxyCODONE IR (ROXICODONE) tablet 2.5 mg, 2.5 mg, Oral, Q6H PRN, Randy Alves MD    pantoprazole (PROTONIX) tablet 40 mg, 40 mg, Oral, ACB&D, Kristie Rojas MD, 40 mg at 03/01/20 0631    dextrose 5% infusion, 75 mL/hr, IntraVENous, CONTINUOUS, Bic, Edwardo PALACIOS MD, Last Rate: 75 mL/hr at 02/29/20 1009, 75 mL/hr at 02/29/20 1009    sodium chloride (NS) flush 5-10 mL, 5-10 mL, IntraVENous, PRN, Randy Alves MD    acetaminophen (TYLENOL) tablet 650 mg, 650 mg, Oral, Q6H PRN, Randy Alves MD, 650 mg at 02/27/20 2008    ondansetron [facility]) injection 4 mg, 4 mg, IntraVENous, Q6H PRN, Randy Alves MD        Objective:     Visit Vitals  /71 (BP 1 Location: Right arm, BP Patient Position: At rest)   Pulse 97   Temp 98.4 °F (36.9 °C)   Resp 17   Ht 5' (1.524 m)   Wt 63.5 kg (140 lb)   LMP  (LMP Unknown)   SpO2 96%   BMI 27.34 kg/m²         Intake/Output Summary (Last 24 hours) at 3/1/2020 1134  Last data filed at 3/1/2020 0743  Gross per 24 hour   Intake 903.75 ml   Output 2200 ml   Net -1296.25 ml       Physical Exam:     Patient is in no apparent distress. HEENT: dry mucosa   Lungs: good air entry, clear to auscultation bilaterally  Cardiovascular system: S1, S2, regular rate and rhythm. Abdomen: soft, non tender, non distended. BS+  Extremities: no clubbing, cyanosis or edema.   Neurologic: awake , not oriented , lethargic     Data Review:    Recent Labs     03/01/20  0557   WBC 7.0   RBC 3.59*   HCT 31.9*   MCV 88.9   MCH 29.2   MCHC 32.9   RDW 16.6*     Recent Labs     03/01/20  0557 02/29/20  0136 02/28/20  1910 02/28/20  0309 02/27/20  1430   BUN 29* 55* 60* 74* 81*   CREA 0.83 1.20 1.36* 1.82* 2.35*   CA 8.0* 7.8* 7.6* 7.8* 9.6   ALB  --  2.0* 2.0* 2.2* 2.8*   K 3.6 3.1* 3.5 3.8 3.9    148* 150* 149* 146*   * 122* 123* 121* 111   CO2 17* 18* 20* 20* 22   PHOS  --  1.4* 1.9*  --   --    GLU 79 94 88 83 157*       Nehemias Rey MD

## 2020-03-01 NOTE — CONSULTS
Consult    Patient: Juan Stone MRN: [id number]  SSN: [id number]    YOB: 1926  Age: 80 y.o. Sex: female      Subjective:      Juan Stone is a 80 y.o. female who is being seen for asked to evaluate and treat ulcer right heel. .There on admission. Past Medical History:   Diagnosis Date    Anticoagulated on Coumadin     Anticoagulation goal of INR 2 to 3     Decreased calculated glomerular filtration rate (GFR) 8/1/2016    Calculated GFR equivalent to that of CKD stage 3 = 30-59 ml/min    Dementia (HCC)     DVT (deep venous thrombosis) (HCC)     Dyslipidemia     Essential hypertension     Glaucoma     Osteoporosis     Postoperative anemia due to acute blood loss 7/29/2016     No past surgical history on file.    Family History   Problem Relation Age of Onset    Heart Disease Mother     Diabetes Mother     Arthritis-osteo Father     Cancer Brother      Social History     Tobacco Use    Smoking status: Never Smoker    Smokeless tobacco: Never Used   Substance Use Topics    Alcohol use: No      Current Facility-Administered Medications   Medication Dose Route Frequency Provider Last Rate Last Dose    ferrous sulfate 300 mg (60 mg iron)/5 mL oral syrup 300 mg  300 mg Oral PCL Sophia Mcneil MD   300 mg at 02/29/20 1308    [Held by provider] atenoloL (TENORMIN) tablet 25 mg  25 mg Oral DAILY Ximena Subramanian MD        ciprofloxacin HCl (CIPRO) tablet 500 mg  500 mg Oral DAILY Sophia Mcneil MD   500 mg at 02/29/20 1052    sodium bicarbonate tablet 650 mg  650 mg Oral BID Shae Hannah MD   650 mg at 02/29/20 1710    latanoprost (XALATAN) 0.005 % ophthalmic solution 1 Drop  1 Drop Both Eyes QPM Law Live MD   1 Drop at 02/29/20 1711    memantine (NAMENDA) tablet 10 mg  10 mg Oral DAILY Law Live MD   10 mg at 02/29/20 1036    apixaban (ELIQUIS) tablet 2.5 mg  2.5 mg Oral BID Law Live MD   2.5 mg at 02/29/20 1710    traMADol (ULTRAM) tablet 50 mg 50 mg Oral Q6H PRN Mar Dash MD        polyethylene glycol (MIRALAX) packet 17 g  17 g Oral DAILY Mar Dash MD   17 g at 02/29/20 1036    bisacodyL (DULCOLAX) suppository 10 mg  10 mg Rectal DAILY PRN Mar Dash MD        oxyCODONE IR (ROXICODONE) tablet 2.5 mg  2.5 mg Oral Q6H PRN Chang Curiel MD        pantoprazole (PROTONIX) tablet 40 mg  40 mg Oral ACB&D Mar Dash MD   40 mg at 03/01/20 0631    dextrose 5% infusion  75 mL/hr IntraVENous CONTINUOUS Edy Esquivel MD 75 mL/hr at 02/29/20 1009 75 mL/hr at 02/29/20 1009    sodium chloride (NS) flush 5-10 mL  5-10 mL IntraVENous PRN Chang Curiel MD        acetaminophen (TYLENOL) tablet 650 mg  650 mg Oral Q6H PRN Chang Curiel MD   650 mg at 02/27/20 2008    ondansetron [facility]) injection 4 mg  4 mg IntraVENous Q6H PRN Chang Curiel MD            No Known Allergies    Review of Systems:  A comprehensive review of systems was negative except for that written in the History of Present Illness. Objective:     Vitals:    02/29/20 2012 03/01/20 0021 03/01/20 0401 03/01/20 0813   BP: 148/87 129/81 132/85 128/71   Pulse: 84 97 88 97   Resp: 18 18 18 17   Temp: 98.5 °F (36.9 °C) 97.8 °F (36.6 °C) 99.1 °F (37.3 °C) 98.4 °F (36.9 °C)   SpO2: 97% 95% 96%    Weight:       Height:            Physical Exam:  Seen at bedside awake, confused. Comming out of pressure relieving boot on the left. Right heel decubitus, posterior heel,  3 cm x 2 cm dry necrosis. Non tender, no drainage. Or inflammation. Assessment:     Hospital Problems  Date Reviewed: 3/1/2020          Codes Class Noted POA    UTI (urinary tract infection) ICD-10-CM: N39.0  ICD-9-CM: 599.0  2/27/2020 Unknown        Severe sepsis ([address] ICD-10-CM: A41.9, R65.20  ICD-9-CM: 038.9, 995.92  2/27/2020 Unknown        ARF (acute renal failure) ([facility] ICD-10-CM: N17.9  ICD-9-CM: 584.9  2/27/2020 Unknown              Plan:     Decubitus ulcer right heel,not infected.  Should heel with pressure relief.     Signed By: Jessica Jauregui DPM     March 1, 2020

## 2020-03-02 VITALS
HEART RATE: 109 BPM | WEIGHT: 140 LBS | OXYGEN SATURATION: 95 % | RESPIRATION RATE: 19 BRPM | SYSTOLIC BLOOD PRESSURE: 119 MMHG | DIASTOLIC BLOOD PRESSURE: 63 MMHG | HEIGHT: 60 IN | TEMPERATURE: 97.2 F | BODY MASS INDEX: 27.48 KG/M2

## 2020-03-02 LAB
ALBUMIN SERPL-MCNC: 2.2 G/DL (ref 3.4–5)
ANION GAP SERPL CALC-SCNC: 8 MMOL/L (ref 3–18)
BUN SERPL-MCNC: 20 MG/DL (ref 7–18)
BUN/CREAT SERPL: 24 (ref 12–20)
CALCIUM SERPL-MCNC: 8.5 MG/DL (ref 8.5–10.1)
CHLORIDE SERPL-SCNC: 116 MMOL/L (ref 100–111)
CO2 SERPL-SCNC: 19 MMOL/L (ref 21–32)
CREAT SERPL-MCNC: 0.82 MG/DL (ref 0.6–1.3)
GLUCOSE SERPL-MCNC: 66 MG/DL (ref 74–99)
PHOSPHATE SERPL-MCNC: 0.8 MG/DL (ref 2.5–4.9)
POTASSIUM SERPL-SCNC: 4 MMOL/L (ref 3.5–5.5)
SODIUM SERPL-SCNC: 143 MMOL/L (ref 136–145)

## 2020-03-02 PROCEDURE — 80069 RENAL FUNCTION PANEL: CPT

## 2020-03-02 PROCEDURE — 92526 ORAL FUNCTION THERAPY: CPT

## 2020-03-02 PROCEDURE — 74011250637 HC RX REV CODE- 250/637: Performed by: INTERNAL MEDICINE

## 2020-03-02 PROCEDURE — 77030038269 HC DRN EXT URIN PURWCK BARD -A

## 2020-03-02 PROCEDURE — 74011250637 HC RX REV CODE- 250/637: Performed by: HOSPITALIST

## 2020-03-02 PROCEDURE — 36415 COLL VENOUS BLD VENIPUNCTURE: CPT

## 2020-03-02 PROCEDURE — 77030037878 HC DRSG MEPILEX >48IN BORD MOLN -B

## 2020-03-02 RX ORDER — ATENOLOL 25 MG/1
25 TABLET ORAL DAILY
Qty: 30 TAB | Refills: 0 | Status: SHIPPED
Start: 2020-03-02

## 2020-03-02 RX ORDER — FACIAL-BODY WIPES
10 EACH TOPICAL
Qty: 10 SUPPOSITORY | Refills: 0 | Status: SHIPPED
Start: 2020-03-02

## 2020-03-02 RX ORDER — TRAMADOL HYDROCHLORIDE 50 MG/1
50 TABLET ORAL
Qty: 16 TAB | Refills: 0 | Status: SHIPPED | OUTPATIENT
Start: 2020-03-02 | End: 2020-03-09

## 2020-03-02 RX ORDER — CIPROFLOXACIN 500 MG/1
500 TABLET ORAL DAILY
Qty: 3 TAB | Refills: 0 | Status: SHIPPED | OUTPATIENT
Start: 2020-03-03

## 2020-03-02 RX ADMIN — DIBASIC SODIUM PHOSPHATE, MONOBASIC POTASSIUM PHOSPHATE AND MONOBASIC SODIUM PHOSPHATE 2 TABLET: 852; 155; 130 TABLET ORAL at 10:00

## 2020-03-02 RX ADMIN — POLYETHYLENE GLYCOL 3350 17 G: 17 POWDER, FOR SOLUTION ORAL at 10:01

## 2020-03-02 RX ADMIN — MINERAL SUPPLEMENT IRON 300 MG / 5 ML STRENGTH LIQUID 100 PER BOX UNFLAVORED 300 MG: at 13:06

## 2020-03-02 RX ADMIN — DIBASIC SODIUM PHOSPHATE, MONOBASIC POTASSIUM PHOSPHATE AND MONOBASIC SODIUM PHOSPHATE 2 TABLET: 852; 155; 130 TABLET ORAL at 17:46

## 2020-03-02 RX ADMIN — PANTOPRAZOLE SODIUM 40 MG: 40 TABLET, DELAYED RELEASE ORAL at 17:46

## 2020-03-02 RX ADMIN — APIXABAN 2.5 MG: 2.5 TABLET, FILM COATED ORAL at 10:00

## 2020-03-02 RX ADMIN — SODIUM BICARBONATE 650 MG TABLET 650 MG: at 17:46

## 2020-03-02 RX ADMIN — MEMANTINE HYDROCHLORIDE 10 MG: 10 TABLET ORAL at 10:00

## 2020-03-02 RX ADMIN — SODIUM BICARBONATE 650 MG TABLET 650 MG: at 10:00

## 2020-03-02 RX ADMIN — APIXABAN 2.5 MG: 2.5 TABLET, FILM COATED ORAL at 17:46

## 2020-03-02 RX ADMIN — PANTOPRAZOLE SODIUM 40 MG: 40 TABLET, DELAYED RELEASE ORAL at 10:00

## 2020-03-02 RX ADMIN — CIPROFLOXACIN HYDROCHLORIDE 500 MG: 500 TABLET, FILM COATED ORAL at 10:00

## 2020-03-02 NOTE — ROUTINE PROCESS
Bedside and Verbal shift change report given to Jose Eduardo Stoddard (oncoming nurse) by Sadia Keen (offgoing nurse). Report included the following information SBAR and Kardex.

## 2020-03-02 NOTE — PROGRESS NOTES
Problem: Dysphagia (Adult)  Goal: *Acute Goals and Plan of Care (Insert Text)  Description  Patient will:  1. Tolerate PO trials with 0 s/s overt distress in 4/5 trials-met  2. Utilize compensatory swallow strategies/maneuvers (decrease bite/sip, size/rate, alt. liq/sol) with min cues in 4/5 trials-met    Rec:     Mech-soft diet with thin liquids  Assistance with all PO  No straws  Aspiration precautions  HOB >45 during po intake, remain >30 for 30-45 minutes after po   Small bites/sips; alternate liquid/solid with slow feeding rate   Oral care TID  Meds in puree    Outcome: Resolved/Met    [id number] [facility] [address] TREATMENT & DISCHARGE    Patient: Nanci Perez (17 y.o. female)  Date: 3/2/2020  Diagnosis: Severe sepsis ([address] [A41.9, R65.20]  UTI (urinary tract infection) [N39.0]  ARF (acute renal failure) ([facility]) [N17.9]  UTI (urinary tract infection) [N39.0]   Precautions: Aspiration    PLOF: Mech soft diet with thin liquids     ASSESSMENT:  Pt seen for follow up dysphagia tx with family friend present who reports pt with minimal intake which she reports is pt's baseline). Pt has been consuming mech soft diet prior to admission with limited intake. Pt tolerating thin liquids via straw with no overt s/sx aspiration; able to utilize labial seal for use of straw with no difficulty. Laryngeal elevation was functional/timely to palpation. Recommend mech soft diet with thin liquids, aspiration precautions, oral care TID, and meds crushed in puree. Family friend agreeable to continuation of mech soft diet. Will sign off. Please re-consult as indicated. Progression toward goals:  Goals met      PLAN:  Recommendations and Planned Interventions:  Maximum therapeutic gains met; safest, least restrictive diet achieved. D/C ST intervention at this time. Discharge Recommendations:  None     SUBJECTIVE:   Patient stated [facility] from [facility].     OBJECTIVE:   Cognitive and Communication Status:  Neurologic State: Confused, Alert  Orientation Level: Oriented to person  Cognition: Follows commands  Dysphagia Treatment:  Oral Assessment:  Oral Assessment  Labial: Decreased rate, Decreased seal  Dentition: Natural, Limited  Oral Hygiene: adequate  Lingual: Decreased rate, Decreased strength  Velum: No impairment  Mandible: No impairment  P.O. Trials:   Patient Position: 50 at [facility]   Vocal quality prior to P.O.: Low volume   Consistency Presented: Thin liquid, Puree, Solid   How Presented: SLP-fed/presented, Cup/sip, Spoon, Straw   Bolus Acceptance: Impaired   Bolus Formation/Control: Impaired   Type of Impairment: Anterior, Delayed, Poor, Posterior, Mastication, Incomplete   Propulsion: Delayed (# of seconds)   Oral Residue: None   Initiation of Swallow: No impairment   Laryngeal Elevation: Functional   Aspiration Signs/Symptoms: None   Pharyngeal Phase Characteristics: No impairment, issues, or problems    Effective Modifications: Small sips and bites, Alternate liquids/solids   Cues for Modifications:  Moderate   Oral Phase Severity: Mild-moderate   Pharyngeal Phase Severity : No impairment     PAIN:  Start of Tx: 0  End of Tx: 0     After treatment:   []              Patient left in no apparent distress sitting up in chair  [x]              Patient left in no apparent distress in bed  [x]              Call bell left within reach  [x]              Nursing notified  []              Caregiver present  []              Bed alarm activated      COMMUNICATION/EDUCATION:   [x] Aspiration precautions; swallow safety; compensatory techniques  [x]        Patient/family able to participate in training and education   []  Patient unable to participate in education; education ongoing with staff  [] Patient understands goals/intent of therapy; neutral about participation     Thank you for this referral,  Grady Guadalupe M.S., [address] [facility]  Speech-Language Pathologist

## 2020-03-02 NOTE — PROGRESS NOTES
responded to call to see patient. Provided pastoral care and will continue to follow and provide care on an as needed/requested basis.     [address] [facility]  [phone]

## 2020-03-02 NOTE — PROGRESS NOTES
Report called to [facility] LPN @ [address] [facility] awaiting [facility] transportation to transport the to the facility  Family member stated that she received a call from the above transporter and they will be here around [phone] for the patient will continue to monitor

## 2020-03-02 NOTE — PROGRESS NOTES
Hospitalist Progress Note    Patient: Davonte Santoyo Age: 80 y.o. : 1926 MR#: [id number] SSN: [id number]  Date/Time: 3/2/2020 9:43 AM    DOA: 2020  PCP: Julio Palomo MD    Subjective:     Patient appears comfortable no distress    Patient is very confused trying to get out of the bed, [facility]    No nausea vomiting diarrhea no shortness of breath    Patient has no IV access to poor IV access    Assessment/Plan:   80year-old female admitted for sepsis present on admission, E. coli urinary tract infection, right heel ulceration and local infection. Patient also has acute renal failure from multiple factors-nephrology following. 1. Sepsis POA (leukocytosis, tachypnea, bandemia, UTI)  2. E.Coli UTI present on admission  3. Leukocytosis, bandemia, resolved   4. PATTI likely associated with sepsis vs hypovolemia, resolved  5. Metabolic acidosis with PATTI, with lactic acidosis associate with sepsis  6. Hypernatremia, improved  7. Hypokalemia -on replacement  8. Indeterminate troponin elevated, likely in setting of PATTI, non cardiac   9. Distal esophagus thickening, reflux esophagitis   10. Saccular aneurysm infrarenal abdominal aorta vs dissection (chronic)  11. Right heal ulceration, pressure ulcer POA   12.  H/o DVT on Eliquis   13. Hypertension, stable   14. Dementia   15. Dyslipidemia   16. Cough with swallowing, moderate dysphagia  17. Iron deficiency anemia     Plan  -Continue current medications and IV antibiotic, ID consultation in a.m.    -I called patient's POA and son over telephone today, explained him regarding what is involved doing CT angiogram including getting possible midline or PICC line for proper IV access, patient herself has very poor IV access so peripheral IV is not enough.   According to patient's son, due to patient's age , dementia, and other comorbid condition, he is not in agreement for any aggressive measures or aggressive treatment and he does not want any added complications with any invasive testing even if it is lifesaving. Still situation: Where patient is uncomfortable he would opt for comfort care    -After discussion I canceled the CT angiogram, briefly discussed with vascular, patient will be placed back to rehab    -Heel wound local wound care     -UTI present on admission-growing E.  Coli -continue Cipro    -Blood pressure medications are on hold due to hypotension-continue to monitor blood pressure    Discussed with case management-placement to rehab will need authorization from insurance,     DNR    Additional Notes:    Time spent >30 minutes    Case discussed with:  [x]Patient  [x]Family  [x]Nursing  [x]Case Management  DVT Prophylaxis:  []Lovenox  []Hep SQ  [x]Eliquis  []Coumadin   []On Heparin gtt    Signed By: Akil Kim MD     2020 9:43 AM              Objective:   VS:   Visit Vitals  /68 (BP 1 Location: Left arm, BP Patient Position: Supine)   Pulse (!) 103   Temp 97.3 °F (36.3 °C)   Resp 19   Ht 5' (1.524 m)   Wt 63.5 kg (140 lb)   LMP  (LMP Unknown)   SpO2 96%   BMI 27.34 kg/m²      Tmax/24hrs: Temp (24hrs), Av.1 °F (36.7 °C), Min:97.3 °F (36.3 °C), Max:98.5 °F (36.9 °C)      Intake/Output Summary (Last 24 hours) at 3/2/2020 1309  Last data filed at 3/2/2020 4027  Gross per 24 hour   Intake 240 ml   Output 325 ml   Net -85 ml     Tele:   General: Patient is confused but alert awake orientation x1 no agitation and no distress  HEENT: PERRL, EOMI, supple neck, no JVD, dry oral mucosa  Cardiovascular: S1S2 regular, no rub/gallop   Pulmonary: Clear air entry bilaterally, no wheezing, no crackle  GI:  Soft, + tender, non distended, +bs, no guarding   Extremities:  No pedal edema, +distal pulses appreciated   Right heel with pressure ulcer, non-infected   Neuro: AOx3, moving all extremities    Additional:       Current Facility-Administered Medications   Medication Dose Route Frequency    phosphorus (K PHOS NEUTRAL) 250 mg tablet 2 Tab  2 Tab Oral BID    0.45% sodium chloride infusion  50 mL/hr IntraVENous CONTINUOUS    ferrous sulfate 300 mg (60 mg iron)/5 mL oral syrup 300 mg  300 mg Oral PCL    atenoloL (TENORMIN) tablet 25 mg  25 mg Oral DAILY    ciprofloxacin HCl (CIPRO) tablet 500 mg  500 mg Oral DAILY    sodium bicarbonate tablet 650 mg  650 mg Oral BID    latanoprost (XALATAN) 0.005 % ophthalmic solution 1 Drop  1 Drop Both Eyes QPM    memantine (NAMENDA) tablet 10 mg  10 mg Oral DAILY    apixaban (ELIQUIS) tablet 2.5 mg  2.5 mg Oral BID    traMADol (ULTRAM) tablet 50 mg  50 mg Oral Q6H PRN    polyethylene glycol (MIRALAX) packet 17 g  17 g Oral DAILY    bisacodyL (DULCOLAX) suppository 10 mg  10 mg Rectal DAILY PRN    oxyCODONE IR (ROXICODONE) tablet 2.5 mg  2.5 mg Oral Q6H PRN    pantoprazole (PROTONIX) tablet 40 mg  40 mg Oral ACB&D    sodium chloride (NS) flush 5-10 mL  5-10 mL IntraVENous PRN    acetaminophen (TYLENOL) tablet 650 mg  650 mg Oral Q6H PRN    ondansetron (ZOFRAN) injection 4 mg  4 mg IntraVENous Q6H PRN            Lab/Data Review:  Labs: Results:       Chemistry Recent Labs     03/02/20 0325 03/01/20 0557 02/29/20 0136 02/28/20 1910   GLU 66* 79 94 88    140 148* 150*   K 4.0 3.6 3.1* 3.5   * 115* 122* 123*   CO2 19* 17* 18* 20*   BUN 20* 29* 55* 60*   CREA 0.82 0.83 1.20 1.36*   BUCR 24* 35* 46* 44*   AGAP 8 8 8 7   CA 8.5 8.0* 7.8* 7.6*   PHOS 0.8*  --  1.4* 1.9*     Recent Labs     03/02/20 0325 02/29/20 0136 02/28/20 1910   ALB 2.2* 2.0* 2.0*      CBC w/Diff Recent Labs     03/01/20 0557   WBC 7.0   RBC 3.59*   HGB 10.5*   HCT 31.9*   MCV 88.9   MCH 29.2   MCHC 32.9   RDW 16.6*         Coagulation No results for input(s): PTP, INR, APTT, INREXT, INREXT in the last 72 hours.     Iron/Ferritin Lab Results   Component Value Date/Time    Iron 25 (L) 07/31/2016 01:35 AM    TIBC 201 (L) 07/31/2016 01:35 AM    Iron % saturation 12 07/31/2016 01:35 AM    Ferritin 121 07/31/2016 01:35 AM       BNP    Cardiac Enzymes Lab Results   Component Value Date/Time    CK 39 02/29/2020 01:36 AM    CK - MB 2.7 02/29/2020 01:36 AM    CK-MB Index 6.9 (H) 02/29/2020 01:36 AM    Troponin-I, QT 0.06 (H) 02/29/2020 01:36 AM        Lactic Acid    Thyroid Studies          All Micro Results     Procedure Component Value Units Date/Time    CULTURE, BLOOD [[id number]] Collected:  02/27/20 1613    Order Status:  Completed Specimen:  Blood Updated:  03/02/20 0716     Special Requests: NO SPECIAL REQUESTS        Culture result: NO GROWTH 4 DAYS       CULTURE, BLOOD [[id number]] Collected:  02/27/20 1558    Order Status:  Completed Specimen:  Blood Updated:  03/02/20 0716     Special Requests: NO SPECIAL REQUESTS        Culture result: NO GROWTH 4 DAYS       CULTURE, URINE [[id number]]  (Abnormal)  (Susceptibility) Collected:  02/27/20 1720    Order Status:  Completed Specimen:  Clean catch Updated:  02/29/20 0639     Special Requests: NO SPECIAL REQUESTS        Culture result:       [id number] COLONIES/mL ESCHERICHIA COLI                  [id number]  COLONIES/mL  MIXED GRAM POSITIVE CAROL, PROBABLE SKIN/GENITAL CONTAMINATION. Images:    CT (Most Recent). CT Results (most recent):  Results from Hospital Encounter encounter on 02/27/20   CT ABD PELV WO CONT    Narrative EXAM:  CT Abdomen-Pelvis without Contrast.    CLINICAL INDICATION:  Sent in from nursing home due to abdominal pain with  nausea and vomiting for 2 days. Sustained a fall yesterday. Dark diarrhea. Abnormal urinalysis. Acute renal failure. Severe sepsis. COMPARISON:  None    TECHNIQUE:      - Helical volumetric CT imaging of the abdomen and pelvis is performed without  IV contrast administration.   Coronal and sagittal multiplanar reconstruction  images are generated for improved anatomic delineation.  - All CT scans at this facility are performed using dose optimization technique  as appropriate to the performed exam, to include automated exposure control,  adjustment of the mA and/or kV according to patient's size (Including  appropriate matching for site-specific examinations), or use of iterative  reconstruction technique. FINDINGS:    Lung Bases:  No airspace opacities. Liver, Adrenal Glands, Pancreas:  Unremarkable. Gallbladder:  Distended gallbladder. Small gallstone located near the  gallbladder neck measuring about 0.2 cm (axial #21). Spleen:  Although the spleen itself is unremarkable, there is a dense calcific  structure immediately adjacent to the splenic hilar region, measuring about 1.9  cm in diameter. Kidneys:  Fat attenuation nodule in the right kidney, measuring about 1.0 x 0.9  cm (axial #22). The left kidney reveals an exophytic hypodensity located near  the lower pole, measuring about 1.6 x 1.4 cm (axial #31). Another hypodensity  is identified in the left kidney measuring about 0.9 x 0.67 m (axial #27). GI Tract: Thickened distal esophagus. The stomach appears grossly  unremarkable. No acute small bowel abnormalities are noted. No acute small  bowel obstruction. The appendix is not confidently visualized. No convincing  evidence of acute appendicitis is detected. No acute colitis or acute  diverticulitis is detected. Severe diverticulosis coli. Moderate stool  retention in the rectum. Nodes:  No mesenteric or retroperitoneal adenopathy. Vascular: Moderate to pronounced atheromatous plaque calcifications. There is  suggestion of a saccular aneurysm in the infrarenal abdominal aorta, with  apparent partially calcified intimal flap protruding into the lumen (axial #29). Because of the this structure being visualized only on 2 or 3 consecutive  slices, aortic dissection is felt to be unlikely but cannot be entirely  excluded. At the level of this probable saccular aneurysm, the aorta measures  about 2.4 x 2.3 cm in diameter.     Bladder:  Secondary to the right hip joint replacement prosthetic component  producing severe Beam scatter artifact, details evaluation of the bladder is  difficult. This is further complicated by the hardware from the previous ORIF  of the left hip. Within the technical limitations of the study, the  underdistended bladder appears grossly unremarkable. Uterus:  Small fibroid suggested along the ventral aspect of the uterus. Adnexa:  No adnexal mass. Bones:  No acute bony abnormalities. Status post right hip joint replacement  and ORIF of the left proximal femur. Impression IMPRESSION:    1. Abnormally thickened appearance of the distal esophagus. Possibly from  reflux esophagitis vs. artifact from hiatal hernia. No acute findings along the  remainder of the gastrointestinal tract. 2.  Distended gallbladder with a small gallstone. 3.  Renal hypodensities. The right renal hypodensity probably corresponds to a  renal cyst.  The left renal hypodensity is of fat attenuation and probably  implies an angiomyolipoma. 4.  Possible saccular aneurysm involving the infrarenal abdominal aorta vs. much  less likely short dissection. If the dissection is indeed present, it is quite  limited in terms of length and is probably of chronic nature. XRAY (Most Recent)      EKG No results found for this or any previous visit.      2D ECHO

## 2020-03-02 NOTE — DISCHARGE SUMMARY
Discharge Summary     Patient ID:  Kya Washington  [id number]  76 y.o.  4/22/1926  Body mass index is 27.34 kg/m². PCP on record: Julio Palomo MD    Admit date: 2/27/2020  Discharge date and time: 3/2/2020    Discharge Diagnoses:                                           1 sepsis present on admission (leukocytosis, tachypnea, bandemia, UTI)  2 E. coli UTI present on admission  3 PATTI-associated with sepsis improved  4 metabolic acidosis-associated with PATTI  5 hyper natremia  6 hypokalemia  7 GERD  8 saccular aneurysm infrarenal abdominal aorta  9 history of DVT on long-term anticoagulation Eliquis  10 dementia severe  11 iron deficiency anemia  12 right heel decubitus- Right heel decubitus, posterior heel, 3 cm x 2 cm dry necrosis. Consults: Nephrology, Vascular Surgery and [facility] Course by problems:    59-year-old -American female past medical history of severe dementia coming from nursing home with a history of nausea and vomiting. Patient is a nursing home resident. Due to severe dementia unable to provide any information most of the information was obtained from discussion with the family ED physician and the notes. Sepsis-patient met criteria for sepsis likely from urinary tract infection adequately treated now on Cipro    Infrarenal saccular aneurysm-incidental finding, at one point it was decided that we will do CT angiogram for further evaluation and management of infrarenal aneurysm, however after detailed discussion with patient's son and POA, it was decided that patient is not a candidate for any further invasive intensive treatments, patient's son wants made aware that this aneurysm can be life-threatening if it ruptures and he is fully aware of it.     Eliquis started at lower dose 2.5 twice daily for history of DVT    Patient's other issues hyponatremia PATTI hypokalemia all were corrected IV fluids was given improved sodium improved other electrolytes-renal consulted for this patient    Patient is a DNR      Patient seen and examined by me on discharge day. Pertinent Findings:  Patient is alert awake orientation x1  HEENT - NAD    RS - Clear , no rales no rhonchi   CVS - regular rhythm and rate acceptable    abd - benign, BS present , no Distension   EXT - no edema , no calf tenderness   Neuro -no focal deficit which was obvious noted      Significant Diagnostic Studies:  Results   CT ABD PELV WO CONT (Accession [id number]) (Order [id number])   Allergies      No Known Allergies   Exam Information     Status Exam Begun  Exam Ended    Final [99] 2/27/2020 19:19 2/27/2020 19:32   Result Information     Status: Final result (Exam End: 2/27/2020 19:32) Provider Status: Open   Study Result     EXAM:  CT Abdomen-Pelvis without Contrast.     CLINICAL INDICATION:  Sent in from nursing home due to abdominal pain with  nausea and vomiting for 2 days. Sustained a fall yesterday. Dark diarrhea. Abnormal urinalysis. Acute renal failure. Severe sepsis.     COMPARISON:  None     TECHNIQUE:       - Helical volumetric CT imaging of the abdomen and pelvis is performed without  IV contrast administration. Coronal and sagittal multiplanar reconstruction  images are generated for improved anatomic delineation.  - All CT scans at this facility are performed using dose optimization technique  as appropriate to the performed exam, to include automated exposure control,  adjustment of the mA and/or kV according to patient's size (Including  appropriate matching for site-specific examinations), or use of iterative  reconstruction technique.     FINDINGS:     Lung Bases:  No airspace opacities.     Liver, Adrenal Glands, Pancreas:  Unremarkable.     Gallbladder:  Distended gallbladder. Small gallstone located near the  gallbladder neck measuring about 0.2 cm (axial #21).      Spleen:  Although the spleen itself is unremarkable, there is a dense calcific  structure immediately adjacent to the splenic hilar region, measuring about 1.9  cm in diameter.     Kidneys:  Fat attenuation nodule in the right kidney, measuring about 1.0 x 0.9  cm (axial #22). The left kidney reveals an exophytic hypodensity located near  the lower pole, measuring about 1.6 x 1.4 cm (axial #31). Another hypodensity  is identified in the left kidney measuring about 0.9 x 0.67 m (axial #27).     GI Tract: Thickened distal esophagus. The stomach appears grossly  unremarkable. No acute small bowel abnormalities are noted. No acute small  bowel obstruction. The appendix is not confidently visualized. No convincing  evidence of acute appendicitis is detected. No acute colitis or acute  diverticulitis is detected. Severe diverticulosis coli. Moderate stool  retention in the rectum.     Nodes:  No mesenteric or retroperitoneal adenopathy.     Vascular: Moderate to pronounced atheromatous plaque calcifications. There is  suggestion of a saccular aneurysm in the infrarenal abdominal aorta, with  apparent partially calcified intimal flap protruding into the lumen (axial #29). Because of the this structure being visualized only on 2 or 3 consecutive  slices, aortic dissection is felt to be unlikely but cannot be entirely  excluded. At the level of this probable saccular aneurysm, the aorta measures  about 2.4 x 2.3 cm in diameter.     Bladder:  Secondary to the right hip joint replacement prosthetic component  producing severe Beam scatter artifact, details evaluation of the bladder is  difficult. This is further complicated by the hardware from the previous ORIF  of the left hip. Within the technical limitations of the study, the  underdistended bladder appears grossly unremarkable.     Uterus:  Small fibroid suggested along the ventral aspect of the uterus.     Adnexa:  No adnexal mass.     Bones:  No acute bony abnormalities.   Status post right hip joint replacement  and ORIF of the left proximal femur.     IMPRESSION  IMPRESSION:     1. Abnormally thickened appearance of the distal esophagus. Possibly from  reflux esophagitis vs. artifact from hiatal hernia. No acute findings along the  remainder of the gastrointestinal tract.        2. Distended gallbladder with a small gallstone.     3. Renal hypodensities. The right renal hypodensity probably corresponds to a  renal cyst.  The left renal hypodensity is of fat attenuation and probably  implies an angiomyolipoma.     4.  Possible saccular aneurysm involving the infrarenal abdominal aorta vs. much  less likely short dissection. If the dissection is indeed present, it is quite  limited in terms of length and is probably of chronic nature. Pertinent Lab Data:  Recent Labs     03/01/20  0557   WBC 7.0   HGB 10.5*   HCT 31.9*        Recent Labs     03/02/20  0325 03/01/20  0557 02/29/20  0136 02/28/20  1910    140 148* 150*   K 4.0 3.6 3.1* 3.5   * 115* 122* 123*   CO2 19* 17* 18* 20*   GLU 66* 79 94 88   BUN 20* 29* 55* 60*   CREA 0.82 0.83 1.20 1.36*   CA 8.5 8.0* 7.8* 7.6*   PHOS 0.8*  --  1.4* 1.9*   ALB 2.2*  --  2.0* 2.0*       DISCHARGE MEDICATIONS:   @  Current Discharge Medication List      START taking these medications    Details   bisacodyL (DULCOLAX) 10 mg suppository Insert 10 mg into rectum daily as needed (constipation). Qty: 10 Suppository, Refills: 0      ciprofloxacin HCl (CIPRO) 500 mg tablet Take 1 Tab by mouth daily. Qty: 3 Tab, Refills: 0      phosphorus (K PHOS NEUTRAL) 250 mg tablet Take 2 Tabs by mouth two (2) times a day for 4 days. Qty: 4 Tab, Refills: 0         CONTINUE these medications which have CHANGED    Details   traMADol (ULTRAM) 50 mg tablet Take 1 Tab by mouth every six (6) hours as needed for Pain for up to 7 days. Max Daily Amount: 200 mg.  Indications: pain  Qty: 16 Tab, Refills: 0    Associated Diagnoses: Displaced fracture of lateral end of left clavicle, initial encounter for closed fracture      apixaban (ELIQUIS) 2.5 mg tablet Take 1 Tab by mouth two (2) times a day. Indications: blood clot in a deep vein of the extremities  Qty: 14 Tab, Refills: 0      atenoloL (TENORMIN) 25 mg tablet Take 1 Tab by mouth daily. Qty: 30 Tab, Refills: 0         CONTINUE these medications which have NOT CHANGED    Details   memantine (NAMENDA) 5 mg tablet Take 10 mg by mouth.      bimatoprost (LUMIGAN) 0.01 % ophthalmic drops Administer 1 Drop to both eyes every evening. Associated Diagnoses: Other closed fracture of distal end of left radius, initial encounter      ferrous sulfate 300 mg (60 mg iron)/5 mL syrup Take 5 mL by mouth daily (with breakfast). Qty: 60 mL, Refills: 0    Associated Diagnoses: Postoperative anemia due to acute blood loss      simvastatin (ZOCOR) 20 mg tablet Take 20 mg by mouth nightly. Indications: DYSLIPIDEMIA         STOP taking these medications       oxyCODONE IR (ROXICODONE) 5 mg immediate release tablet Comments:   Reason for Stopping:         lisinopril (PRINIVIL, ZESTRIL) 20 mg tablet Comments:   Reason for Stopping:         nystatin (MYCOSTATIN) 100,000 unit/gram ointment Comments:   Reason for Stopping:         acetaminophen (TYLENOL) 325 mg tablet Comments:   Reason for Stopping:                 My Recommended Diet, Activity, Wound Care, and follow-up labs are listed in the patient's Discharge Insturctions which I have personally completed and reviewed. Disposition:     [] Home with family     [] [facility] [place] PT/RN   [x] SNF/NH   [] Inpatient Rehab/ALINE  Condition at Discharge:  Stable    Follow up with:   PCP : Julio Palomo MD      Please follow-up tests/labs that are still pendin.  None  2.    >30 minutes spent coordinating this discharge (review instructions/follow-up, prescriptions, preparing report for sign off)    Signed:  Ruddy Quezada MD  3/2/2020  2:35 PM

## 2020-03-02 NOTE — PROGRESS NOTES
In Patient Progress note    Admit Date: 2/27/2020    Impression:     #1 PATTI due to prerenal azotemia in the setting of poor intake/dehydration due to diarrhea /UTI  #2 sepsis/sirs d/t UTI   #3 Ecoli  UTI  #4 hypertension   #5 lactic acidosis, improved  #6 altered mental status  #7 dementia  #8 hypernatremia better  #9 Probable saccular aneurysm infrarenal   #10 hypophosphatasia   #23 NG metabolic acidoses     Plan:  #1 continue IVF @ 1/2 NS @ 50 cc/hrs for 12 hrs after CTA   #2 renal panel, phos  in AM  #3 continue to hold her lisinopril and metoprolol,   okay to keep blood pressures in 130s to 225 range systolic  #4 avoid nephrotoxins and NSAIDs  #5 follow vascular recommendations, ok to get CTA now that creat < 1   Gentle hydration as above pre and post CTA   #6 renally dose medications and antibiotics  #7 replaced phos     Please call with questions,    Marjan Snow MD FASN  Cell [phone]  Pager: [phone]     Subjective:     Awake and alert   Denies SOB,CP      Current Facility-Administered Medications:     phosphorus (K PHOS NEUTRAL) 250 mg tablet 2 Tab, 2 Tab, Oral, BID, Yara Hannah MD    0.45% sodium chloride infusion, 50 mL/hr, IntraVENous, CONTINUOUS, Edwardo Hannah MD, Last Rate: 50 mL/hr at 03/01/20 1510, 50 mL/hr at 03/01/20 1510    ferrous sulfate 300 mg (60 mg iron)/5 mL oral syrup 300 mg, 300 mg, Oral, PCL, Sophia Sapp MD, 300 mg at 03/01/20 1251    [Held by provider] atenoloL (TENORMIN) tablet 25 mg, 25 mg, Oral, DAILY, Jesus Banks MD    ciprofloxacin HCl (CIPRO) tablet 500 mg, 500 mg, Oral, DAILY, Sophia Sapp MD, 500 mg at 03/01/20 0920    sodium bicarbonate tablet 650 mg, 650 mg, Oral, BID, Edwardo Hannah MD, 650 mg at 03/01/20 1708    latanoprost (XALATAN) 0.005 % ophthalmic solution 1 Drop, 1 Drop, Both Eyes, QPM, Deisi Gardner MD, 1 Drop at 03/01/20 1709    memantine (NAMENDA) tablet 10 mg, 10 mg, Oral, DAILY, Deisi Gardner MD, 10 mg at 03/01/20 5581   apixaban (ELIQUIS) tablet 2.5 mg, 2.5 mg, Oral, BID, Mike Enrique MD, 2.5 mg at 03/01/20 1708    traMADol (ULTRAM) tablet 50 mg, 50 mg, Oral, Q6H PRN, Kai Campbell MD    polyethylene glycol (MIRALAX) packet 17 g, 17 g, Oral, DAILY, Sophia Lugo MD, 17 g at 03/01/20 0920    bisacodyL (DULCOLAX) suppository 10 mg, 10 mg, Rectal, DAILY PRN, Kai Campbell MD    oxyCODONE IR (ROXICODONE) tablet 2.5 mg, 2.5 mg, Oral, Q6H PRN, Mike Enrique MD    pantoprazole (PROTONIX) tablet 40 mg, 40 mg, Oral, ACB&D, Kai Campbell MD, 40 mg at 03/01/20 1614    sodium chloride (NS) flush 5-10 mL, 5-10 mL, IntraVENous, PRN, Mike Enrique MD    acetaminophen (TYLENOL) tablet 650 mg, 650 mg, Oral, Q6H PRN, Mike Enrique MD, 650 mg at 03/01/20 1259    ondansetron (ZOFRAN) injection 4 mg, 4 mg, IntraVENous, Q6H PRN, Mike Enrique MD        Objective:     Visit Vitals  /81 (BP 1 Location: Left arm, BP Patient Position: Supine)   Pulse (!) 114   Temp 98.5 °F (36.9 °C)   Resp 19   Ht 5' (1.524 m)   Wt 63.5 kg (140 lb)   LMP  (LMP Unknown)   SpO2 99%   BMI 27.34 kg/m²         Intake/Output Summary (Last 24 hours) at 3/2/2020 0843  Last data filed at 3/2/2020 0412  Gross per 24 hour   Intake 275 ml   Output 325 ml   Net -50 ml       Physical Exam:     Patient is in no apparent distress. HEENT: dry mucosa   Lungs: good air entry, clear to auscultation bilaterally  Cardiovascular system: S1, S2, regular rate and rhythm. Abdomen: soft, non tender, non distended. BS+  Extremities: no clubbing, cyanosis or edema.   Neurologic: awake , not oriented , lethargic     Data Review:    Recent Labs     03/01/20  0557   WBC 7.0   RBC 3.59*   HCT 31.9*   MCV 88.9   MCH 29.2   MCHC 32.9   RDW 16.6*     Recent Labs     03/02/20  0325 03/01/20  0557 02/29/20  0136 02/28/20  1910   BUN 20* 29* 55* 60*   CREA 0.82 0.83 1.20 1.36*   CA 8.5 8.0* 7.8* 7.6*   ALB 2.2*  --  2.0* 2.0*   K 4.0 3.6 3.1* 3.5    140 148* 150*   CL 116* 115* 122* 123*   CO2 19* 17* 18* 20*   PHOS 0.8*  --  1.4* 1.9*   GLU 66* 79 94 88       Ced Gandhi MD

## 2020-03-02 NOTE — PROGRESS NOTES
Bedside and Verbal shift change report given to [employer] (oncoming nurse) by Parish Cisneros RN   (offgoing nurse). Report given with SBAR, Kardex, MAR and Recent Results.

## 2020-03-02 NOTE — PROGRESS NOTES
Problem: Pressure Injury - Risk of  Goal: *Prevention of pressure injury  Description  Document Rosas Scale and appropriate interventions in the flowsheet. Outcome: Progressing Towards Goal  Note: Pressure Injury Interventions:  Sensory Interventions: Assess changes in LOC, Check visual cues for pain    Moisture Interventions: Absorbent underpads, Check for incontinence Q2 hours and as needed    Activity Interventions: Pressure redistribution bed/mattress(bed type)    Mobility Interventions: HOB 30 degrees or less, Pressure redistribution bed/mattress (bed type)    Nutrition Interventions: Document food/fluid/supplement intake    Friction and Shear Interventions: HOB 30 degrees or less, Minimize layers       Problem: Falls - Risk of  Goal: *Absence of Falls  Description  Document Dennys Fall Risk and appropriate interventions in the flowsheet.   Outcome: Progressing Towards Goal  Note: Fall Risk Interventions:     Mentation Interventions: Bed/chair exit alarm, Door open when patient unattended, Adequate sleep, hydration, pain control    Medication Interventions: Bed/chair exit alarm, Patient to call before getting OOB    Elimination Interventions: Call light in reach, Bed/chair exit alarm, Toileting schedule/hourly rounds    History of Falls Interventions: Bed/chair exit alarm, Door open when patient unattended, Room close to nurse's station

## 2020-03-02 NOTE — ROUTINE PROCESS
Bedside and Verbal shift change report given to Altagracia RN (oncoming nurse) by [address] RN (offgoing nurse). Report included the following information SBAR, Kardex, Intake/Output, MAR and Recent Results.

## 2020-03-02 NOTE — PROGRESS NOTES
Medicine note reviewed. \"-I called patient's POA and son over telephone today, explained him regarding what is involved doing CT angiogram including getting possible midline or PICC line for proper IV access, patient herself has very poor IV access so peripheral IV is not enough. According to patient's son, due to patient's age , dementia, and other comorbid condition, he is not in agreement for any aggressive measures or aggressive treatment and he does not want any added complications with any invasive testing even if it is lifesaving. Still situation: Where patient is uncomfortable he would opt for comfort care     -After discussion I canceled the CT angiogram, briefly discussed with vascular, patient will be placed back to rehab\"    Will sign off and remain available as needed. Please call with any further questions or concerns.

## 2020-03-02 NOTE — PROGRESS NOTES
Pt mews score 3 related to elevated . Pt resting in bed quietly, no complaints of pain or SOB. MD made aware.      03/02/20 0731   Vital Signs   Temp 98.5 °F (36.9 °C)   Temp Source Oral   Pulse (Heart Rate) (!) 114   Heart Rate Source Brachial   Resp Rate 19   O2 Sat (%) 99 %   Level of Consciousness Alert   /81   MAP (Calculated) 97   BP 1 Method Automatic   BP 1 Location Left arm   BP Patient Position Supine   MEWS Score 3

## 2020-03-02 NOTE — PROGRESS NOTES
Transition of Care Plan to LTC    LTC Transition:  Patient has been accepted to [facility] and meets criteria for admission. Patient will  be transported by [facility] and expected to leave at 1730. Communication to Patient/Family:  Met with patient and discussed with Patricia Weston via telephone (POA/son) and they are agreeable to the transition plan. Communication to SNF/Rehab:  Bedside RN, Edna Piedra, has been notified of the transition plan to the facility and to call report (phone number [phone]). Discharge information has been updated on the AVS. And communicated to facility via [facility], or CC link. LTC Transition:    PCP/Specialist: Dr Magdi Babcock Please include all hard scripts for controlled substances, med rec and dc summary, and AVS in packet. Reviewed and confirmed with facility representative, Jocelyne Mccormack  at [facility] they can manage the patient care needs for the following:     Mayra Smiling with (X) only those applicable:    Medication:  [x]  Medications will be available at the facility  []  IV Antibiotics   [x]  Controlled Substance - hard copy to be sent with patient   []  Weekly Labs    Documents:  [x] Hard RX  Number sent 3 (Ultram, Cipro, Phos)  [] MAR  [] Kardex  [x] AVS  [] Wound care note  [x]Transfer Summary/Discharge Summary    Equipment:  []  CPAP/BiPAP  []  Wound Vacuum  []  Clark or Urinary Device  []  PICC/Central Line  []  Nebulizer  []  Ventilator    Treatment:  []Isolation (for MRSA, VRE, etc.)  []Surgical Drain Management  []Tracheostomy Care  []Dressing Changes  []Dialysis with transportation and chair time  Center Mode of tranpsort   []PEG Care  []Oxygen  []Daily Weights for Heart Failure    Dietary:  []Any diet limitations  []Tube Feedings   []Total Parenteral Management (TPN)    Eligible for Medicaid Long Term Services and Supports  Yes:  [] Eligible for medical assistance or will become eligible within 180 days and LTSS completed.    [] Provider/Patient and/or support system has requested screening.  [] LTSS copy provided to patient or responsible party, .  [] LTSS unavailable at discharge will send once processed to SNF provider.  [] LTSS  unavailable at discharged mailed to patient  [] LTSS performed by outside agency  on  with tracking number   No:   [x] Private pay and is not financially eligible for Medicaid within the next 180 days. [] Reside out-of-state.   [] A residents of a state owned/operated facility that is licensed  by [address] [facility] or [facility]  [] Enrollment in [facility] hospice services  [] [address] [place] [address]  [] Patient /Family declines to have screening completed or provide financial information for screening          Financial Resources:  Medicaid    [] Initiated and application pending   [] Full coverage     Private pay LTC   Advanced Care Plan:  []Surrogate Decision Maker of Care  [x]POA  [x]Communicated Code Status/ sent  (DDNR)    Other      Deven Gracía RN BSN  Care Manager  [phone]

## 2020-03-03 NOTE — PROGRESS NOTES
1925: Assumed patient care from [address] Patient is alert and oriented to person,but disoriented to place, time and situation. Respiratory status Is stable on room air. Vital signs are stable. MEWS score is a two. Patient denies any pain, discomfort, nausea vomiting dizziness or anxiety. White board and fall card is updated. Bed is locked and in lowest position. Call bell, water and personal belongings are within reach. Patient has no questions, comments or concerns after bedside shift report.

## 2020-03-04 LAB
BACTERIA SPEC CULT: NORMAL
BACTERIA SPEC CULT: NORMAL
SERVICE CMNT-IMP: NORMAL
SERVICE CMNT-IMP: NORMAL

## 2020-03-23 ENCOUNTER — HOSPITAL ENCOUNTER (OUTPATIENT)
Dept: LAB | Age: 85
Discharge: HOME | End: 2020-03-23
Payer: MEDICARE

## 2020-03-23 LAB
ANION GAP SERPL CALC-SCNC: 5 MMOL/L (ref 3–18)
BUN SERPL-MCNC: 18 MG/DL (ref 7–18)
BUN/CREAT SERPL: 14 (ref 12–20)
CALCIUM SERPL-MCNC: 8.2 MG/DL (ref 8.5–10.1)
CHLORIDE SERPL-SCNC: 109 MMOL/L (ref 100–111)
CO2 SERPL-SCNC: 29 MMOL/L (ref 21–32)
CREAT SERPL-MCNC: 1.25 MG/DL (ref 0.6–1.3)
ERYTHROCYTE [DISTWIDTH] IN BLOOD BY AUTOMATED COUNT: 17.2 % (ref 11.6–14.5)
GLUCOSE SERPL-MCNC: 81 MG/DL (ref 74–99)
HCT VFR BLD AUTO: 32.8 % (ref 35–45)
HGB BLD-MCNC: 10.6 G/DL (ref 12–16)
MCH RBC QN AUTO: 28.6 PG (ref 24–34)
MCHC RBC AUTO-ENTMCNC: 32.3 G/DL (ref 31–37)
MCV RBC AUTO: 88.6 FL (ref 74–97)
PLATELET # BLD AUTO: 265 K/UL (ref 135–420)
PMV BLD AUTO: 12 FL (ref 9.2–11.8)
POTASSIUM SERPL-SCNC: 3 MMOL/L (ref 3.5–5.5)
RBC # BLD AUTO: 3.7 M/UL (ref 4.2–5.3)
SODIUM SERPL-SCNC: 143 MMOL/L (ref 136–145)
WBC # BLD AUTO: 5.8 K/UL (ref 4.6–13.2)

## 2020-03-23 PROCEDURE — 85027 COMPLETE CBC AUTOMATED: CPT

## 2020-03-23 PROCEDURE — 80048 BASIC METABOLIC PNL TOTAL CA: CPT

## 2020-04-02 ENCOUNTER — HOSPITAL ENCOUNTER (OUTPATIENT)
Dept: LAB | Age: 85
Discharge: HOME | End: 2020-04-02
Payer: MEDICARE

## 2020-04-02 LAB
ALBUMIN SERPL-MCNC: 2 G/DL (ref 3.4–5)
PREALB SERPL-MCNC: 4.9 MG/DL (ref 20–40)

## 2020-04-02 PROCEDURE — 84134 ASSAY OF PREALBUMIN: CPT

## 2020-04-02 PROCEDURE — 82040 ASSAY OF SERUM ALBUMIN: CPT

## 2020-04-17 ENCOUNTER — HOSPITAL ENCOUNTER (OUTPATIENT)
Dept: LAB | Age: 85
Discharge: HOME | End: 2020-04-17

## 2020-04-17 PROCEDURE — 87635 SARS-COV-2 COVID-19 AMP PRB: CPT

## 2020-04-19 LAB — COVID-19, XGCOVT: NEGATIVE

## 2020-04-27 ENCOUNTER — HOSPITAL ENCOUNTER (OUTPATIENT)
Dept: LAB | Age: 85
Discharge: HOME | End: 2020-04-27

## 2020-04-27 PROCEDURE — 87635 SARS-COV-2 COVID-19 AMP PRB: CPT

## 2020-04-28 LAB — SARS-COV-2, COV2NT: NOT DETECTED

## 2020-05-02 ENCOUNTER — HOSPITAL ENCOUNTER (OUTPATIENT)
Dept: LAB | Age: 85
Discharge: HOME | End: 2020-05-02

## 2020-05-02 PROCEDURE — 87635 SARS-COV-2 COVID-19 AMP PRB: CPT

## 2020-05-04 LAB — SARS-COV-2, COV2NT: DETECTED

## 2020-05-06 ENCOUNTER — HOSPITAL ENCOUNTER (OUTPATIENT)
Dept: LAB | Age: 85
Discharge: HOME | End: 2020-05-06
Payer: MEDICARE

## 2020-05-06 LAB
ALBUMIN SERPL-MCNC: 2 G/DL (ref 3.4–5)
ALBUMIN/GLOB SERPL: 0.5 {RATIO} (ref 0.8–1.7)
ALP SERPL-CCNC: 69 U/L (ref 45–117)
ALT SERPL-CCNC: 14 U/L (ref 13–56)
ANION GAP SERPL CALC-SCNC: 4 MMOL/L (ref 3–18)
AST SERPL-CCNC: 23 U/L (ref 10–38)
BASOPHILS # BLD: 0 K/UL (ref 0–0.1)
BASOPHILS NFR BLD: 1 % (ref 0–2)
BILIRUB SERPL-MCNC: 0.4 MG/DL (ref 0.2–1)
BUN SERPL-MCNC: 17 MG/DL (ref 7–18)
BUN/CREAT SERPL: 25 (ref 12–20)
CALCIUM SERPL-MCNC: 8.2 MG/DL (ref 8.5–10.1)
CHLORIDE SERPL-SCNC: 115 MMOL/L (ref 100–111)
CO2 SERPL-SCNC: 29 MMOL/L (ref 21–32)
CREAT SERPL-MCNC: 0.69 MG/DL (ref 0.6–1.3)
CRP SERPL-MCNC: 1.7 MG/DL (ref 0–0.3)
D DIMER PPP FEU-MCNC: 0.74 UG/ML(FEU)
DIFFERENTIAL METHOD BLD: ABNORMAL
EOSINOPHIL # BLD: 0.1 K/UL (ref 0–0.4)
EOSINOPHIL NFR BLD: 3 % (ref 0–5)
ERYTHROCYTE [DISTWIDTH] IN BLOOD BY AUTOMATED COUNT: 16.9 % (ref 11.6–14.5)
FERRITIN SERPL-MCNC: 262 NG/ML (ref 8–388)
GLOBULIN SER CALC-MCNC: 3.7 G/DL (ref 2–4)
GLUCOSE SERPL-MCNC: 62 MG/DL (ref 74–99)
HCT VFR BLD AUTO: 36.9 % (ref 35–45)
HGB BLD-MCNC: 11.8 G/DL (ref 12–16)
LDH SERPL L TO P-CCNC: 205 U/L (ref 81–234)
LYMPHOCYTES # BLD: 1.1 K/UL (ref 0.9–3.6)
LYMPHOCYTES NFR BLD: 26 % (ref 21–52)
MCH RBC QN AUTO: 29.3 PG (ref 24–34)
MCHC RBC AUTO-ENTMCNC: 32 G/DL (ref 31–37)
MCV RBC AUTO: 91.6 FL (ref 74–97)
MONOCYTES # BLD: 0.8 K/UL (ref 0.05–1.2)
MONOCYTES NFR BLD: 19 % (ref 3–10)
NEUTS SEG # BLD: 2.2 K/UL (ref 1.8–8)
NEUTS SEG NFR BLD: 51 % (ref 40–73)
PLATELET # BLD AUTO: 221 K/UL (ref 135–420)
PMV BLD AUTO: 12.2 FL (ref 9.2–11.8)
POTASSIUM SERPL-SCNC: 4.5 MMOL/L (ref 3.5–5.5)
PROT SERPL-MCNC: 5.7 G/DL (ref 6.4–8.2)
RBC # BLD AUTO: 4.03 M/UL (ref 4.2–5.3)
SODIUM SERPL-SCNC: 148 MMOL/L (ref 136–145)
WBC # BLD AUTO: 4.2 K/UL (ref 4.6–13.2)

## 2020-05-06 PROCEDURE — 80053 COMPREHEN METABOLIC PANEL: CPT

## 2020-05-06 PROCEDURE — 85379 FIBRIN DEGRADATION QUANT: CPT

## 2020-05-06 PROCEDURE — 83615 LACTATE (LD) (LDH) ENZYME: CPT

## 2020-05-06 PROCEDURE — 85025 COMPLETE CBC W/AUTO DIFF WBC: CPT

## 2020-05-06 PROCEDURE — 82728 ASSAY OF FERRITIN: CPT

## 2020-05-06 PROCEDURE — 86140 C-REACTIVE PROTEIN: CPT

## 2020-06-09 ENCOUNTER — HOSPITAL ENCOUNTER (OUTPATIENT)
Dept: LAB | Age: 85
Discharge: HOME | End: 2020-06-09

## 2020-06-09 PROCEDURE — 87635 SARS-COV-2 COVID-19 AMP PRB: CPT

## 2020-06-11 LAB
SARS-COV-2, COV2NT: NOT DETECTED
SOURCE, COVRS: NORMAL

## 2020-06-24 ENCOUNTER — HOSPITAL ENCOUNTER (OUTPATIENT)
Dept: LAB | Age: 85
Discharge: HOME | End: 2020-06-24

## 2020-06-24 PROCEDURE — 87635 SARS-COV-2 COVID-19 AMP PRB: CPT

## 2020-06-28 LAB — SARS-COV-2, COV2NT: NOT DETECTED

## 2020-07-30 ENCOUNTER — HOSPITAL ENCOUNTER (OUTPATIENT)
Dept: LAB | Age: 85
Discharge: HOME | End: 2020-07-30
Payer: MEDICARE

## 2020-07-30 LAB
ALBUMIN SERPL-MCNC: 2.6 G/DL (ref 3.4–5)
PREALB SERPL-MCNC: 10.3 MG/DL (ref 20–40)

## 2020-07-30 PROCEDURE — 82040 ASSAY OF SERUM ALBUMIN: CPT

## 2020-07-30 PROCEDURE — 84134 ASSAY OF PREALBUMIN: CPT

## 2020-08-27 ENCOUNTER — HOSPITAL ENCOUNTER (OUTPATIENT)
Dept: LAB | Age: 85
Discharge: HOME | End: 2020-08-27

## 2020-08-27 PROCEDURE — 87635 SARS-COV-2 COVID-19 AMP PRB: CPT

## 2020-08-29 LAB — SARS-COV-2, COV2NT: NOT DETECTED

## 2020-09-11 ENCOUNTER — HOSPITAL ENCOUNTER (OUTPATIENT)
Dept: LAB | Age: 85
Discharge: HOME | End: 2020-09-11
Payer: MEDICARE

## 2020-09-11 LAB
ALBUMIN SERPL-MCNC: 2.7 G/DL (ref 3.4–5)
ALBUMIN/GLOB SERPL: 0.9 {RATIO} (ref 0.8–1.7)
ALP SERPL-CCNC: 57 U/L (ref 45–117)
ALT SERPL-CCNC: 10 U/L (ref 13–56)
ANION GAP SERPL CALC-SCNC: 3 MMOL/L (ref 3–18)
AST SERPL-CCNC: 14 U/L (ref 10–38)
BASOPHILS # BLD: 0 K/UL (ref 0–0.1)
BASOPHILS NFR BLD: 1 % (ref 0–2)
BILIRUB SERPL-MCNC: 0.5 MG/DL (ref 0.2–1)
BUN SERPL-MCNC: 26 MG/DL (ref 7–18)
BUN/CREAT SERPL: 27 (ref 12–20)
CALCIUM SERPL-MCNC: 9.3 MG/DL (ref 8.5–10.1)
CHLORIDE SERPL-SCNC: 110 MMOL/L (ref 100–111)
CO2 SERPL-SCNC: 32 MMOL/L (ref 21–32)
CREAT SERPL-MCNC: 0.97 MG/DL (ref 0.6–1.3)
DIFFERENTIAL METHOD BLD: ABNORMAL
EOSINOPHIL # BLD: 0.3 K/UL (ref 0–0.4)
EOSINOPHIL NFR BLD: 6 % (ref 0–5)
ERYTHROCYTE [DISTWIDTH] IN BLOOD BY AUTOMATED COUNT: 15.4 % (ref 11.6–14.5)
GLOBULIN SER CALC-MCNC: 3 G/DL (ref 2–4)
GLUCOSE SERPL-MCNC: 68 MG/DL (ref 74–99)
HCT VFR BLD AUTO: 34.9 % (ref 35–45)
HGB BLD-MCNC: 11 G/DL (ref 12–16)
LYMPHOCYTES # BLD: 1.4 K/UL (ref 0.9–3.6)
LYMPHOCYTES NFR BLD: 27 % (ref 21–52)
MCH RBC QN AUTO: 30 PG (ref 24–34)
MCHC RBC AUTO-ENTMCNC: 31.5 G/DL (ref 31–37)
MCV RBC AUTO: 95.1 FL (ref 74–97)
MONOCYTES # BLD: 0.6 K/UL (ref 0.05–1.2)
MONOCYTES NFR BLD: 11 % (ref 3–10)
NEUTS SEG # BLD: 3 K/UL (ref 1.8–8)
NEUTS SEG NFR BLD: 55 % (ref 40–73)
PLATELET # BLD AUTO: 257 K/UL (ref 135–420)
PMV BLD AUTO: 11.8 FL (ref 9.2–11.8)
POTASSIUM SERPL-SCNC: 4.5 MMOL/L (ref 3.5–5.5)
PROT SERPL-MCNC: 5.7 G/DL (ref 6.4–8.2)
RBC # BLD AUTO: 3.67 M/UL (ref 4.2–5.3)
SODIUM SERPL-SCNC: 145 MMOL/L (ref 136–145)
WBC # BLD AUTO: 5.4 K/UL (ref 4.6–13.2)

## 2020-09-11 PROCEDURE — 85025 COMPLETE CBC W/AUTO DIFF WBC: CPT

## 2020-09-11 PROCEDURE — 80053 COMPREHEN METABOLIC PANEL: CPT

## 2020-10-24 ENCOUNTER — HOSPITAL ENCOUNTER (OUTPATIENT)
Dept: LAB | Age: 85
Discharge: HOME | End: 2020-10-24

## 2020-10-24 PROCEDURE — 87635 SARS-COV-2 COVID-19 AMP PRB: CPT

## 2020-10-26 LAB — SARS-COV-2, COV2NT: NOT DETECTED

## 2020-10-30 ENCOUNTER — HOSPITAL ENCOUNTER (OUTPATIENT)
Dept: LAB | Age: 85
Discharge: HOME | End: 2020-10-30

## 2020-10-30 PROCEDURE — 87635 SARS-COV-2 COVID-19 AMP PRB: CPT

## 2020-11-01 LAB — SARS-COV-2, COV2NT: NOT DETECTED

## 2020-11-06 ENCOUNTER — HOSPITAL ENCOUNTER (OUTPATIENT)
Dept: LAB | Age: 85
Discharge: HOME | End: 2020-11-06

## 2020-11-06 PROCEDURE — 87635 SARS-COV-2 COVID-19 AMP PRB: CPT

## 2020-11-08 LAB — SARS-COV-2, COV2NT: NOT DETECTED

## 2020-11-11 ENCOUNTER — HOSPITAL ENCOUNTER (OUTPATIENT)
Dept: LAB | Age: 85
Discharge: HOME | End: 2020-11-11

## 2020-11-11 PROCEDURE — 87635 SARS-COV-2 COVID-19 AMP PRB: CPT

## 2020-11-14 LAB — SARS-COV-2, COV2NT: NOT DETECTED

## 2020-11-19 ENCOUNTER — HOSPITAL ENCOUNTER (OUTPATIENT)
Dept: LAB | Age: 85
Discharge: HOME | End: 2020-11-19

## 2020-11-19 ENCOUNTER — HOSPITAL ENCOUNTER (OUTPATIENT)
Dept: LAB | Age: 85
Discharge: HOME | End: 2020-11-19
Payer: MEDICARE

## 2020-11-19 LAB
ANION GAP SERPL CALC-SCNC: 4 MMOL/L (ref 3–18)
BASOPHILS # BLD: 0 K/UL (ref 0–0.1)
BASOPHILS NFR BLD: 1 % (ref 0–2)
BUN SERPL-MCNC: 22 MG/DL (ref 7–18)
BUN/CREAT SERPL: 28 (ref 12–20)
CALCIUM SERPL-MCNC: 9.2 MG/DL (ref 8.5–10.1)
CHLORIDE SERPL-SCNC: 111 MMOL/L (ref 100–111)
CO2 SERPL-SCNC: 29 MMOL/L (ref 21–32)
CREAT SERPL-MCNC: 0.8 MG/DL (ref 0.6–1.3)
DIFFERENTIAL METHOD BLD: ABNORMAL
EOSINOPHIL # BLD: 0.3 K/UL (ref 0–0.4)
EOSINOPHIL NFR BLD: 5 % (ref 0–5)
ERYTHROCYTE [DISTWIDTH] IN BLOOD BY AUTOMATED COUNT: 15 % (ref 11.6–14.5)
GLUCOSE SERPL-MCNC: 69 MG/DL (ref 74–99)
HCT VFR BLD AUTO: 33.5 % (ref 35–45)
HGB BLD-MCNC: 10.7 G/DL (ref 12–16)
LYMPHOCYTES # BLD: 1.2 K/UL (ref 0.9–3.6)
LYMPHOCYTES NFR BLD: 27 % (ref 21–52)
MCH RBC QN AUTO: 30 PG (ref 24–34)
MCHC RBC AUTO-ENTMCNC: 31.9 G/DL (ref 31–37)
MCV RBC AUTO: 93.8 FL (ref 74–97)
MONOCYTES # BLD: 0.5 K/UL (ref 0.05–1.2)
MONOCYTES NFR BLD: 10 % (ref 3–10)
NEUTS SEG # BLD: 2.7 K/UL (ref 1.8–8)
NEUTS SEG NFR BLD: 57 % (ref 40–73)
PLATELET # BLD AUTO: 250 K/UL (ref 135–420)
PMV BLD AUTO: 11.5 FL (ref 9.2–11.8)
POTASSIUM SERPL-SCNC: 4.3 MMOL/L (ref 3.5–5.5)
RBC # BLD AUTO: 3.57 M/UL (ref 4.2–5.3)
SODIUM SERPL-SCNC: 144 MMOL/L (ref 136–145)
WBC # BLD AUTO: 4.7 K/UL (ref 4.6–13.2)

## 2020-11-19 PROCEDURE — 85025 COMPLETE CBC W/AUTO DIFF WBC: CPT

## 2020-11-19 PROCEDURE — 80048 BASIC METABOLIC PNL TOTAL CA: CPT

## 2020-11-19 PROCEDURE — 87635 SARS-COV-2 COVID-19 AMP PRB: CPT

## 2020-11-22 LAB — SARS-COV-2, COV2NT: NOT DETECTED

## 2020-11-24 ENCOUNTER — HOSPITAL ENCOUNTER (OUTPATIENT)
Dept: LAB | Age: 85
Discharge: HOME | End: 2020-11-24

## 2020-11-24 PROCEDURE — 87635 SARS-COV-2 COVID-19 AMP PRB: CPT

## 2020-11-26 LAB — SARS-COV-2, COV2NT: NOT DETECTED

## 2020-12-01 ENCOUNTER — HOSPITAL ENCOUNTER (OUTPATIENT)
Dept: LAB | Age: 85
Discharge: HOME | End: 2020-12-01

## 2020-12-01 PROCEDURE — 87635 SARS-COV-2 COVID-19 AMP PRB: CPT

## 2020-12-03 LAB — SARS-COV-2, COV2NT: NOT DETECTED

## 2020-12-08 NOTE — ED NOTES
Attempted blood specimen draw, unsuccessful/ [place] aware and will try M-Plasty Complex Repair Preamble Text (Leave Blank If You Do Not Want): Extensive wide undermining was performed.

## 2020-12-18 ENCOUNTER — HOSPITAL ENCOUNTER (OUTPATIENT)
Dept: LAB | Age: 85
Discharge: HOME | End: 2020-12-18

## 2020-12-18 PROCEDURE — 87635 SARS-COV-2 COVID-19 AMP PRB: CPT

## 2020-12-21 LAB — SARS-COV-2, COV2NT: NOT DETECTED

## 2020-12-23 ENCOUNTER — HOSPITAL ENCOUNTER (OUTPATIENT)
Dept: LAB | Age: 85
Discharge: HOME | End: 2020-12-23

## 2020-12-23 PROCEDURE — 87635 SARS-COV-2 COVID-19 AMP PRB: CPT

## 2020-12-25 LAB — SARS-COV-2, COV2NT: NOT DETECTED

## 2020-12-28 ENCOUNTER — HOSPITAL ENCOUNTER (OUTPATIENT)
Dept: LAB | Age: 85
Discharge: HOME | End: 2020-12-28

## 2020-12-28 PROCEDURE — 87635 SARS-COV-2 COVID-19 AMP PRB: CPT

## 2020-12-30 LAB — SARS-COV-2, COV2NT: NOT DETECTED

## 2021-01-04 ENCOUNTER — HOSPITAL ENCOUNTER (OUTPATIENT)
Dept: LAB | Age: 86
Discharge: HOME | End: 2021-01-04

## 2021-01-04 PROCEDURE — 87635 SARS-COV-2 COVID-19 AMP PRB: CPT

## 2021-01-07 LAB — SARS-COV-2, COV2NT: NOT DETECTED

## 2021-01-08 ENCOUNTER — HOSPITAL ENCOUNTER (OUTPATIENT)
Dept: LAB | Age: 86
Discharge: HOME | End: 2021-01-08

## 2021-01-08 PROCEDURE — 87635 SARS-COV-2 COVID-19 AMP PRB: CPT

## 2021-01-09 LAB — SARS-COV-2, COV2NT: NOT DETECTED

## 2021-01-15 ENCOUNTER — HOSPITAL ENCOUNTER (OUTPATIENT)
Dept: LAB | Age: 86
Discharge: HOME | End: 2021-01-15

## 2021-01-15 PROCEDURE — U0003 INFECTIOUS AGENT DETECTION BY NUCLEIC ACID (DNA OR RNA); SEVERE ACUTE RESPIRATORY SYNDROME CORONAVIRUS 2 (SARS-COV-2) (CORONAVIRUS DISEASE [COVID-19]), AMPLIFIED PROBE TECHNIQUE, MAKING USE OF HIGH THROUGHPUT TECHNOLOGIES AS DESCRIBED BY CMS-2020-01-R: HCPCS

## 2021-01-18 LAB — SARS-COV-2, COV2NT: NOT DETECTED

## 2021-01-19 ENCOUNTER — HOSPITAL ENCOUNTER (OUTPATIENT)
Dept: LAB | Age: 86
Discharge: HOME | End: 2021-01-19

## 2021-01-19 PROCEDURE — U0003 INFECTIOUS AGENT DETECTION BY NUCLEIC ACID (DNA OR RNA); SEVERE ACUTE RESPIRATORY SYNDROME CORONAVIRUS 2 (SARS-COV-2) (CORONAVIRUS DISEASE [COVID-19]), AMPLIFIED PROBE TECHNIQUE, MAKING USE OF HIGH THROUGHPUT TECHNOLOGIES AS DESCRIBED BY CMS-2020-01-R: HCPCS

## 2021-01-21 LAB — SARS-COV-2, COV2NT: NOT DETECTED

## 2021-01-26 ENCOUNTER — HOSPITAL ENCOUNTER (OUTPATIENT)
Dept: LAB | Age: 86
Discharge: HOME | End: 2021-01-26
Payer: MEDICARE

## 2021-01-26 ENCOUNTER — HOSPITAL ENCOUNTER (OUTPATIENT)
Dept: LAB | Age: 86
Discharge: HOME | End: 2021-01-26

## 2021-01-26 LAB — 25(OH)D3 SERPL-MCNC: 32.3 NG/ML (ref 30–100)

## 2021-01-26 PROCEDURE — 82306 VITAMIN D 25 HYDROXY: CPT

## 2021-01-26 PROCEDURE — U0003 INFECTIOUS AGENT DETECTION BY NUCLEIC ACID (DNA OR RNA); SEVERE ACUTE RESPIRATORY SYNDROME CORONAVIRUS 2 (SARS-COV-2) (CORONAVIRUS DISEASE [COVID-19]), AMPLIFIED PROBE TECHNIQUE, MAKING USE OF HIGH THROUGHPUT TECHNOLOGIES AS DESCRIBED BY CMS-2020-01-R: HCPCS

## 2021-01-28 LAB — SARS-COV-2, COV2NT: NOT DETECTED

## 2021-02-02 ENCOUNTER — HOSPITAL ENCOUNTER (OUTPATIENT)
Dept: LAB | Age: 86
Discharge: HOME | End: 2021-02-02

## 2021-02-02 PROCEDURE — U0003 INFECTIOUS AGENT DETECTION BY NUCLEIC ACID (DNA OR RNA); SEVERE ACUTE RESPIRATORY SYNDROME CORONAVIRUS 2 (SARS-COV-2) (CORONAVIRUS DISEASE [COVID-19]), AMPLIFIED PROBE TECHNIQUE, MAKING USE OF HIGH THROUGHPUT TECHNOLOGIES AS DESCRIBED BY CMS-2020-01-R: HCPCS

## 2021-02-04 LAB — SARS-COV-2, COV2NT: NOT DETECTED

## 2021-02-09 ENCOUNTER — HOSPITAL ENCOUNTER (OUTPATIENT)
Dept: LAB | Age: 86
Discharge: HOME | End: 2021-02-09

## 2021-02-09 PROCEDURE — U0003 INFECTIOUS AGENT DETECTION BY NUCLEIC ACID (DNA OR RNA); SEVERE ACUTE RESPIRATORY SYNDROME CORONAVIRUS 2 (SARS-COV-2) (CORONAVIRUS DISEASE [COVID-19]), AMPLIFIED PROBE TECHNIQUE, MAKING USE OF HIGH THROUGHPUT TECHNOLOGIES AS DESCRIBED BY CMS-2020-01-R: HCPCS

## 2021-02-10 LAB — SARS-COV-2, COV2NT: NOT DETECTED

## 2021-02-16 ENCOUNTER — HOSPITAL ENCOUNTER (OUTPATIENT)
Dept: LAB | Age: 86
Discharge: HOME | End: 2021-02-16

## 2021-02-16 PROCEDURE — U0003 INFECTIOUS AGENT DETECTION BY NUCLEIC ACID (DNA OR RNA); SEVERE ACUTE RESPIRATORY SYNDROME CORONAVIRUS 2 (SARS-COV-2) (CORONAVIRUS DISEASE [COVID-19]), AMPLIFIED PROBE TECHNIQUE, MAKING USE OF HIGH THROUGHPUT TECHNOLOGIES AS DESCRIBED BY CMS-2020-01-R: HCPCS

## 2021-02-17 LAB — SARS-COV-2, COV2NT: NOT DETECTED

## 2021-02-23 ENCOUNTER — HOSPITAL ENCOUNTER (OUTPATIENT)
Dept: LAB | Age: 86
Discharge: HOME | End: 2021-02-23

## 2021-02-23 PROCEDURE — U0003 INFECTIOUS AGENT DETECTION BY NUCLEIC ACID (DNA OR RNA); SEVERE ACUTE RESPIRATORY SYNDROME CORONAVIRUS 2 (SARS-COV-2) (CORONAVIRUS DISEASE [COVID-19]), AMPLIFIED PROBE TECHNIQUE, MAKING USE OF HIGH THROUGHPUT TECHNOLOGIES AS DESCRIBED BY CMS-2020-01-R: HCPCS

## 2021-02-25 LAB — SARS-COV-2, COV2NT: NOT DETECTED

## 2021-03-02 ENCOUNTER — HOSPITAL ENCOUNTER (OUTPATIENT)
Dept: LAB | Age: 86
Discharge: HOME | End: 2021-03-02

## 2021-03-02 PROCEDURE — U0003 INFECTIOUS AGENT DETECTION BY NUCLEIC ACID (DNA OR RNA); SEVERE ACUTE RESPIRATORY SYNDROME CORONAVIRUS 2 (SARS-COV-2) (CORONAVIRUS DISEASE [COVID-19]), AMPLIFIED PROBE TECHNIQUE, MAKING USE OF HIGH THROUGHPUT TECHNOLOGIES AS DESCRIBED BY CMS-2020-01-R: HCPCS

## 2021-03-03 LAB — SARS-COV-2, COV2NT: NOT DETECTED

## 2021-03-09 ENCOUNTER — HOSPITAL ENCOUNTER (OUTPATIENT)
Dept: LAB | Age: 86
Discharge: HOME | End: 2021-03-09

## 2021-03-09 PROCEDURE — U0003 INFECTIOUS AGENT DETECTION BY NUCLEIC ACID (DNA OR RNA); SEVERE ACUTE RESPIRATORY SYNDROME CORONAVIRUS 2 (SARS-COV-2) (CORONAVIRUS DISEASE [COVID-19]), AMPLIFIED PROBE TECHNIQUE, MAKING USE OF HIGH THROUGHPUT TECHNOLOGIES AS DESCRIBED BY CMS-2020-01-R: HCPCS

## 2021-03-11 LAB — SARS-COV-2, COV2NT: NOT DETECTED

## 2021-03-16 ENCOUNTER — HOSPITAL ENCOUNTER (OUTPATIENT)
Dept: LAB | Age: 86
Discharge: HOME | End: 2021-03-16

## 2021-03-16 PROCEDURE — U0003 INFECTIOUS AGENT DETECTION BY NUCLEIC ACID (DNA OR RNA); SEVERE ACUTE RESPIRATORY SYNDROME CORONAVIRUS 2 (SARS-COV-2) (CORONAVIRUS DISEASE [COVID-19]), AMPLIFIED PROBE TECHNIQUE, MAKING USE OF HIGH THROUGHPUT TECHNOLOGIES AS DESCRIBED BY CMS-2020-01-R: HCPCS

## 2021-03-17 LAB — SARS-COV-2, COV2NT: NOT DETECTED

## 2021-04-01 ENCOUNTER — HOSPITAL ENCOUNTER (OUTPATIENT)
Dept: LAB | Age: 86
Discharge: HOME | End: 2021-04-01

## 2021-04-01 PROCEDURE — U0005 INFEC AGEN DETEC AMPLI PROBE: HCPCS

## 2021-04-03 LAB — SARS-COV-2, COV2NT: NOT DETECTED

## 2021-04-06 ENCOUNTER — HOSPITAL ENCOUNTER (OUTPATIENT)
Dept: LAB | Age: 86
Discharge: HOME | End: 2021-04-06

## 2021-04-06 PROCEDURE — U0003 INFECTIOUS AGENT DETECTION BY NUCLEIC ACID (DNA OR RNA); SEVERE ACUTE RESPIRATORY SYNDROME CORONAVIRUS 2 (SARS-COV-2) (CORONAVIRUS DISEASE [COVID-19]), AMPLIFIED PROBE TECHNIQUE, MAKING USE OF HIGH THROUGHPUT TECHNOLOGIES AS DESCRIBED BY CMS-2020-01-R: HCPCS

## 2021-04-08 LAB — SARS-COV-2, COV2NT: NOT DETECTED

## 2021-04-13 ENCOUNTER — HOSPITAL ENCOUNTER (OUTPATIENT)
Dept: LAB | Age: 86
Discharge: HOME | End: 2021-04-13

## 2021-04-13 PROCEDURE — U0003 INFECTIOUS AGENT DETECTION BY NUCLEIC ACID (DNA OR RNA); SEVERE ACUTE RESPIRATORY SYNDROME CORONAVIRUS 2 (SARS-COV-2) (CORONAVIRUS DISEASE [COVID-19]), AMPLIFIED PROBE TECHNIQUE, MAKING USE OF HIGH THROUGHPUT TECHNOLOGIES AS DESCRIBED BY CMS-2020-01-R: HCPCS

## 2021-04-15 LAB — SARS-COV-2, COV2NT: NOT DETECTED

## 2021-04-20 ENCOUNTER — HOSPITAL ENCOUNTER (OUTPATIENT)
Dept: LAB | Age: 86
Discharge: HOME | End: 2021-04-20

## 2021-04-20 PROCEDURE — U0003 INFECTIOUS AGENT DETECTION BY NUCLEIC ACID (DNA OR RNA); SEVERE ACUTE RESPIRATORY SYNDROME CORONAVIRUS 2 (SARS-COV-2) (CORONAVIRUS DISEASE [COVID-19]), AMPLIFIED PROBE TECHNIQUE, MAKING USE OF HIGH THROUGHPUT TECHNOLOGIES AS DESCRIBED BY CMS-2020-01-R: HCPCS

## 2021-04-21 LAB — SARS-COV-2, COV2NT: NOT DETECTED

## 2021-04-27 ENCOUNTER — HOSPITAL ENCOUNTER (OUTPATIENT)
Dept: LAB | Age: 86
Discharge: HOME | End: 2021-04-27

## 2021-04-27 PROCEDURE — U0003 INFECTIOUS AGENT DETECTION BY NUCLEIC ACID (DNA OR RNA); SEVERE ACUTE RESPIRATORY SYNDROME CORONAVIRUS 2 (SARS-COV-2) (CORONAVIRUS DISEASE [COVID-19]), AMPLIFIED PROBE TECHNIQUE, MAKING USE OF HIGH THROUGHPUT TECHNOLOGIES AS DESCRIBED BY CMS-2020-01-R: HCPCS

## 2021-04-28 LAB — SARS-COV-2, COV2NT: NOT DETECTED

## 2021-05-04 ENCOUNTER — HOSPITAL ENCOUNTER (OUTPATIENT)
Dept: LAB | Age: 86
Discharge: HOME | End: 2021-05-04

## 2021-05-04 PROCEDURE — U0003 INFECTIOUS AGENT DETECTION BY NUCLEIC ACID (DNA OR RNA); SEVERE ACUTE RESPIRATORY SYNDROME CORONAVIRUS 2 (SARS-COV-2) (CORONAVIRUS DISEASE [COVID-19]), AMPLIFIED PROBE TECHNIQUE, MAKING USE OF HIGH THROUGHPUT TECHNOLOGIES AS DESCRIBED BY CMS-2020-01-R: HCPCS

## 2021-05-05 LAB — SARS-COV-2, COV2NT: NOT DETECTED

## 2021-08-07 ENCOUNTER — HOSPITAL ENCOUNTER (OUTPATIENT)
Dept: LAB | Age: 86
Discharge: HOME | End: 2021-08-07

## 2021-08-07 PROCEDURE — U0003 INFECTIOUS AGENT DETECTION BY NUCLEIC ACID (DNA OR RNA); SEVERE ACUTE RESPIRATORY SYNDROME CORONAVIRUS 2 (SARS-COV-2) (CORONAVIRUS DISEASE [COVID-19]), AMPLIFIED PROBE TECHNIQUE, MAKING USE OF HIGH THROUGHPUT TECHNOLOGIES AS DESCRIBED BY CMS-2020-01-R: HCPCS

## 2021-08-11 LAB — SARS-COV-2, COV2NT: NOT DETECTED

## 2021-08-13 ENCOUNTER — HOSPITAL ENCOUNTER (OUTPATIENT)
Dept: LAB | Age: 86
Discharge: HOME | End: 2021-08-13

## 2021-08-13 PROCEDURE — U0003 INFECTIOUS AGENT DETECTION BY NUCLEIC ACID (DNA OR RNA); SEVERE ACUTE RESPIRATORY SYNDROME CORONAVIRUS 2 (SARS-COV-2) (CORONAVIRUS DISEASE [COVID-19]), AMPLIFIED PROBE TECHNIQUE, MAKING USE OF HIGH THROUGHPUT TECHNOLOGIES AS DESCRIBED BY CMS-2020-01-R: HCPCS

## 2021-08-15 LAB — SARS-COV-2, COV2NT: NOT DETECTED

## 2021-08-20 ENCOUNTER — HOSPITAL ENCOUNTER (OUTPATIENT)
Dept: LAB | Age: 86
Discharge: HOME | End: 2021-08-20

## 2021-08-20 PROCEDURE — U0003 INFECTIOUS AGENT DETECTION BY NUCLEIC ACID (DNA OR RNA); SEVERE ACUTE RESPIRATORY SYNDROME CORONAVIRUS 2 (SARS-COV-2) (CORONAVIRUS DISEASE [COVID-19]), AMPLIFIED PROBE TECHNIQUE, MAKING USE OF HIGH THROUGHPUT TECHNOLOGIES AS DESCRIBED BY CMS-2020-01-R: HCPCS

## 2021-08-24 LAB — SARS-COV-2, COV2NT: NOT DETECTED

## 2021-08-25 ENCOUNTER — HOSPITAL ENCOUNTER (OUTPATIENT)
Dept: LAB | Age: 86
Discharge: HOME | End: 2021-08-25

## 2021-08-25 PROCEDURE — U0005 INFEC AGEN DETEC AMPLI PROBE: HCPCS

## 2021-08-28 LAB — SARS-COV-2, COV2NT: NOT DETECTED

## 2021-09-01 ENCOUNTER — HOSPITAL ENCOUNTER (OUTPATIENT)
Dept: LAB | Age: 86
Discharge: HOME | End: 2021-09-01

## 2021-09-01 PROCEDURE — U0003 INFECTIOUS AGENT DETECTION BY NUCLEIC ACID (DNA OR RNA); SEVERE ACUTE RESPIRATORY SYNDROME CORONAVIRUS 2 (SARS-COV-2) (CORONAVIRUS DISEASE [COVID-19]), AMPLIFIED PROBE TECHNIQUE, MAKING USE OF HIGH THROUGHPUT TECHNOLOGIES AS DESCRIBED BY CMS-2020-01-R: HCPCS

## 2021-09-02 LAB — SARS-COV-2, COV2NT: NOT DETECTED

## 2021-09-08 ENCOUNTER — HOSPITAL ENCOUNTER (OUTPATIENT)
Dept: LAB | Age: 86
Discharge: HOME | End: 2021-09-08

## 2021-09-08 PROCEDURE — U0003 INFECTIOUS AGENT DETECTION BY NUCLEIC ACID (DNA OR RNA); SEVERE ACUTE RESPIRATORY SYNDROME CORONAVIRUS 2 (SARS-COV-2) (CORONAVIRUS DISEASE [COVID-19]), AMPLIFIED PROBE TECHNIQUE, MAKING USE OF HIGH THROUGHPUT TECHNOLOGIES AS DESCRIBED BY CMS-2020-01-R: HCPCS

## 2021-09-09 LAB — SARS-COV-2, COV2NT: NOT DETECTED

## 2021-09-15 ENCOUNTER — HOSPITAL ENCOUNTER (OUTPATIENT)
Dept: LAB | Age: 86
Discharge: HOME | End: 2021-09-15

## 2021-09-15 PROCEDURE — U0003 INFECTIOUS AGENT DETECTION BY NUCLEIC ACID (DNA OR RNA); SEVERE ACUTE RESPIRATORY SYNDROME CORONAVIRUS 2 (SARS-COV-2) (CORONAVIRUS DISEASE [COVID-19]), AMPLIFIED PROBE TECHNIQUE, MAKING USE OF HIGH THROUGHPUT TECHNOLOGIES AS DESCRIBED BY CMS-2020-01-R: HCPCS

## 2021-09-16 LAB — SARS-COV-2, COV2NT: NOT DETECTED

## 2021-09-22 ENCOUNTER — HOSPITAL ENCOUNTER (OUTPATIENT)
Dept: LAB | Age: 86
Discharge: HOME | End: 2021-09-22

## 2021-09-22 PROCEDURE — U0003 INFECTIOUS AGENT DETECTION BY NUCLEIC ACID (DNA OR RNA); SEVERE ACUTE RESPIRATORY SYNDROME CORONAVIRUS 2 (SARS-COV-2) (CORONAVIRUS DISEASE [COVID-19]), AMPLIFIED PROBE TECHNIQUE, MAKING USE OF HIGH THROUGHPUT TECHNOLOGIES AS DESCRIBED BY CMS-2020-01-R: HCPCS

## 2021-09-23 LAB — SARS-COV-2, COV2NT: NOT DETECTED

## 2021-09-29 ENCOUNTER — HOSPITAL ENCOUNTER (OUTPATIENT)
Dept: LAB | Age: 86
Discharge: HOME | End: 2021-09-29

## 2021-09-29 PROCEDURE — U0003 INFECTIOUS AGENT DETECTION BY NUCLEIC ACID (DNA OR RNA); SEVERE ACUTE RESPIRATORY SYNDROME CORONAVIRUS 2 (SARS-COV-2) (CORONAVIRUS DISEASE [COVID-19]), AMPLIFIED PROBE TECHNIQUE, MAKING USE OF HIGH THROUGHPUT TECHNOLOGIES AS DESCRIBED BY CMS-2020-01-R: HCPCS

## 2021-09-30 LAB — SARS-COV-2, COV2NT: NOT DETECTED

## 2021-10-06 ENCOUNTER — HOSPITAL ENCOUNTER (OUTPATIENT)
Dept: LAB | Age: 86
Discharge: HOME | End: 2021-10-06

## 2021-10-06 PROCEDURE — U0003 INFECTIOUS AGENT DETECTION BY NUCLEIC ACID (DNA OR RNA); SEVERE ACUTE RESPIRATORY SYNDROME CORONAVIRUS 2 (SARS-COV-2) (CORONAVIRUS DISEASE [COVID-19]), AMPLIFIED PROBE TECHNIQUE, MAKING USE OF HIGH THROUGHPUT TECHNOLOGIES AS DESCRIBED BY CMS-2020-01-R: HCPCS

## 2021-10-07 LAB — SARS-COV-2, COV2NT: NOT DETECTED

## 2021-10-19 ENCOUNTER — HOSPITAL ENCOUNTER (OUTPATIENT)
Dept: LAB | Age: 86
Discharge: HOME | End: 2021-10-19
Payer: MEDICARE

## 2021-10-19 LAB
APPEARANCE UR: ABNORMAL
BACTERIA URNS QL MICRO: ABNORMAL /HPF
BILIRUB UR QL: NEGATIVE
COLOR UR: YELLOW
EPITH CASTS URNS QL MICRO: ABNORMAL /LPF (ref 0–5)
GLUCOSE UR STRIP.AUTO-MCNC: NEGATIVE MG/DL
HGB UR QL STRIP: NEGATIVE
KETONES UR QL STRIP.AUTO: NEGATIVE MG/DL
LEUKOCYTE ESTERASE UR QL STRIP.AUTO: ABNORMAL
NITRITE UR QL STRIP.AUTO: NEGATIVE
PH UR STRIP: 5.5 [PH] (ref 5–8)
PROT UR STRIP-MCNC: NEGATIVE MG/DL
RBC #/AREA URNS HPF: ABNORMAL /HPF (ref 0–5)
SP GR UR REFRACTOMETRY: 1.02 (ref 1–1.03)
UROBILINOGEN UR QL STRIP.AUTO: 0.2 EU/DL (ref 0.2–1)
WBC URNS QL MICRO: ABNORMAL /HPF (ref 0–4)

## 2021-10-19 PROCEDURE — 87186 SC STD MICRODIL/AGAR DIL: CPT

## 2021-10-19 PROCEDURE — 87077 CULTURE AEROBIC IDENTIFY: CPT

## 2021-10-19 PROCEDURE — 81001 URINALYSIS AUTO W/SCOPE: CPT

## 2021-10-19 PROCEDURE — 87086 URINE CULTURE/COLONY COUNT: CPT

## 2021-10-22 LAB
BACTERIA SPEC CULT: ABNORMAL
BACTERIA SPEC CULT: ABNORMAL
CC UR VC: ABNORMAL
SERVICE CMNT-IMP: ABNORMAL

## 2021-11-04 ENCOUNTER — HOSPITAL ENCOUNTER (OUTPATIENT)
Dept: LAB | Age: 86
Discharge: HOME | End: 2021-11-04
Payer: MEDICARE

## 2021-11-04 LAB
APPEARANCE UR: ABNORMAL
BACTERIA URNS QL MICRO: ABNORMAL /HPF
BILIRUB UR QL: NEGATIVE
COLOR UR: YELLOW
EPITH CASTS URNS QL MICRO: ABNORMAL /LPF (ref 0–5)
GLUCOSE UR STRIP.AUTO-MCNC: NEGATIVE MG/DL
HGB UR QL STRIP: ABNORMAL
KETONES UR QL STRIP.AUTO: NEGATIVE MG/DL
LEUKOCYTE ESTERASE UR QL STRIP.AUTO: ABNORMAL
NITRITE UR QL STRIP.AUTO: POSITIVE
PH UR STRIP: 5.5 [PH] (ref 5–8)
PROT UR STRIP-MCNC: NEGATIVE MG/DL
RBC #/AREA URNS HPF: ABNORMAL /HPF (ref 0–5)
SP GR UR REFRACTOMETRY: 1.02 (ref 1–1.03)
UROBILINOGEN UR QL STRIP.AUTO: 0.2 EU/DL (ref 0.2–1)
WBC URNS QL MICRO: ABNORMAL /HPF (ref 0–5)

## 2021-11-04 PROCEDURE — 81001 URINALYSIS AUTO W/SCOPE: CPT

## 2021-11-04 PROCEDURE — 87186 SC STD MICRODIL/AGAR DIL: CPT

## 2021-11-04 PROCEDURE — 87086 URINE CULTURE/COLONY COUNT: CPT

## 2021-11-04 PROCEDURE — 87077 CULTURE AEROBIC IDENTIFY: CPT

## 2021-11-07 LAB
BACTERIA SPEC CULT: ABNORMAL
CC UR VC: ABNORMAL
SERVICE CMNT-IMP: ABNORMAL

## 2021-11-15 ENCOUNTER — HOSPITAL ENCOUNTER (OUTPATIENT)
Dept: LAB | Age: 86
Discharge: HOME | End: 2021-11-15
Payer: MEDICARE

## 2021-11-15 LAB
ANION GAP SERPL CALC-SCNC: 4 MMOL/L (ref 3–18)
BUN SERPL-MCNC: 27 MG/DL (ref 7–18)
BUN/CREAT SERPL: 24 (ref 12–20)
CALCIUM SERPL-MCNC: 9.2 MG/DL (ref 8.5–10.1)
CHLORIDE SERPL-SCNC: 105 MMOL/L (ref 100–111)
CO2 SERPL-SCNC: 28 MMOL/L (ref 21–32)
CREAT SERPL-MCNC: 1.11 MG/DL (ref 0.6–1.3)
ERYTHROCYTE [DISTWIDTH] IN BLOOD BY AUTOMATED COUNT: 13.9 % (ref 11.6–14.5)
GLUCOSE SERPL-MCNC: 114 MG/DL (ref 74–99)
HCT VFR BLD AUTO: 39.5 % (ref 35–45)
HGB BLD-MCNC: 12 G/DL (ref 12–16)
MCH RBC QN AUTO: 28.6 PG (ref 24–34)
MCHC RBC AUTO-ENTMCNC: 30.4 G/DL (ref 31–37)
MCV RBC AUTO: 94.3 FL (ref 78–100)
NRBC # BLD: 0 K/UL (ref 0–0.01)
NRBC BLD-RTO: 0 PER 100 WBC
PLATELET # BLD AUTO: 289 K/UL (ref 135–420)
PMV BLD AUTO: 11.4 FL (ref 9.2–11.8)
POTASSIUM SERPL-SCNC: 6 MMOL/L (ref 3.5–5.5)
RBC # BLD AUTO: 4.19 M/UL (ref 4.2–5.3)
SODIUM SERPL-SCNC: 137 MMOL/L (ref 136–145)
WBC # BLD AUTO: 5.5 K/UL (ref 4.6–13.2)

## 2021-11-15 PROCEDURE — 80048 BASIC METABOLIC PNL TOTAL CA: CPT

## 2021-11-15 PROCEDURE — 36415 COLL VENOUS BLD VENIPUNCTURE: CPT

## 2021-11-15 PROCEDURE — 85027 COMPLETE CBC AUTOMATED: CPT

## 2021-11-17 ENCOUNTER — HOSPITAL ENCOUNTER (OUTPATIENT)
Dept: LAB | Age: 86
Discharge: HOME | End: 2021-11-17
Payer: MEDICARE

## 2021-11-17 LAB — POTASSIUM SERPL-SCNC: 3.6 MMOL/L (ref 3.5–5.5)

## 2021-11-17 PROCEDURE — 84132 ASSAY OF SERUM POTASSIUM: CPT

## 2021-11-17 PROCEDURE — 36415 COLL VENOUS BLD VENIPUNCTURE: CPT

## 2021-11-23 ENCOUNTER — HOSPITAL ENCOUNTER (OUTPATIENT)
Dept: LAB | Age: 86
Discharge: HOME | End: 2021-11-23
Payer: MEDICARE

## 2021-11-23 LAB — 25(OH)D3 SERPL-MCNC: 35.3 NG/ML (ref 30–100)

## 2021-11-23 PROCEDURE — 36415 COLL VENOUS BLD VENIPUNCTURE: CPT

## 2021-11-23 PROCEDURE — 82306 VITAMIN D 25 HYDROXY: CPT

## 2022-03-18 PROBLEM — N17.9 ARF (ACUTE RENAL FAILURE) (HCC): Status: ACTIVE | Noted: 2020-02-27

## 2022-03-19 PROBLEM — N39.0 UTI (URINARY TRACT INFECTION): Status: ACTIVE | Noted: 2020-02-27

## 2022-03-20 PROBLEM — R65.20 SEVERE SEPSIS (HCC): Status: ACTIVE | Noted: 2020-02-27

## 2022-03-20 PROBLEM — A41.9 SEVERE SEPSIS (HCC): Status: ACTIVE | Noted: 2020-02-27

## 2022-04-02 ASSESSMENT — KERATOMETRY
OD_AXISANGLE_DEGREES: 036
OS_AXISANGLE2_DEGREES: 069
OS_AXISANGLE_DEGREES: 159
OS_K1POWER_DIOPTERS: 44.75
OD_K2POWER_DIOPTERS: 43.75
OD_K1POWER_DIOPTERS: 44.50
OD_AXISANGLE2_DEGREES: 126
OS_K2POWER_DIOPTERS: 43.25

## 2022-04-02 ASSESSMENT — VISUAL ACUITY
OS_CC: 20/25-2
OD_CC: 20/30-2
OS_CC: 20/25-2
OS_GLARE: 20/50
OD_CC: 20/25
OD_GLARE: 20/60
OD_CC: 20/30
OS_CC: 20/30-2

## 2022-04-02 ASSESSMENT — TONOMETRY
OS_IOP_MMHG: 20
OD_IOP_MMHG: 17
OS_IOP_MMHG: 17
OS_IOP_MMHG: 18
OD_IOP_MMHG: 19
OD_IOP_MMHG: 20

## 2023-01-31 RX ORDER — CIPROFLOXACIN 500 MG/1
500 TABLET, FILM COATED ORAL DAILY
COMMUNITY
Start: 2020-03-03

## 2023-01-31 RX ORDER — ATENOLOL 25 MG/1
25 TABLET ORAL DAILY
COMMUNITY
Start: 2020-03-02

## 2023-01-31 RX ORDER — BISACODYL 10 MG
10 SUPPOSITORY, RECTAL RECTAL DAILY PRN
COMMUNITY
Start: 2020-03-02

## 2023-01-31 RX ORDER — SIMVASTATIN 20 MG
20 TABLET ORAL
COMMUNITY

## 2023-01-31 RX ORDER — FERROUS SULFATE 300 MG/5ML
300 LIQUID (ML) ORAL
COMMUNITY
Start: 2016-08-17

## 2023-01-31 RX ORDER — MEMANTINE HYDROCHLORIDE 5 MG/1
10 TABLET ORAL
COMMUNITY

## 2024-01-17 ENCOUNTER — HOSPITAL ENCOUNTER (EMERGENCY)
Facility: HOSPITAL | Age: 89
Discharge: HOME | End: 2024-01-18
Attending: EMERGENCY MEDICINE
Payer: MEDICARE

## 2024-01-17 DIAGNOSIS — R65.20 SEVERE SEPSIS (HCC): ICD-10-CM

## 2024-01-17 DIAGNOSIS — K92.2 UPPER GI BLEED: ICD-10-CM

## 2024-01-17 DIAGNOSIS — Z51.5 HOSPICE CARE PATIENT: Primary | ICD-10-CM

## 2024-01-17 DIAGNOSIS — A41.9 SEVERE SEPSIS (HCC): ICD-10-CM

## 2024-01-17 LAB — LACTATE BLD-SCNC: 2.67 MMOL/L (ref 0.4–2)

## 2024-01-17 PROCEDURE — 99285 EMERGENCY DEPT VISIT HI MDM: CPT

## 2024-01-17 PROCEDURE — 83605 ASSAY OF LACTIC ACID: CPT

## 2024-01-17 PROCEDURE — 87040 BLOOD CULTURE FOR BACTERIA: CPT

## 2024-01-17 PROCEDURE — 93005 ELECTROCARDIOGRAM TRACING: CPT | Performed by: EMERGENCY MEDICINE

## 2024-01-18 ENCOUNTER — APPOINTMENT (OUTPATIENT)
Facility: HOSPITAL | Age: 89
End: 2024-01-18
Payer: MEDICARE

## 2024-01-18 ENCOUNTER — HOME CARE VISIT (OUTPATIENT)
Age: 89
End: 2024-01-18
Payer: MEDICARE

## 2024-01-18 ENCOUNTER — HOSPICE ADMISSION (OUTPATIENT)
Age: 89
End: 2024-01-18
Payer: MEDICARE

## 2024-01-18 VITALS
RESPIRATION RATE: 16 BRPM | OXYGEN SATURATION: 94 % | TEMPERATURE: 97.5 F | DIASTOLIC BLOOD PRESSURE: 52 MMHG | SYSTOLIC BLOOD PRESSURE: 101 MMHG | HEART RATE: 59 BPM

## 2024-01-18 VITALS
HEART RATE: 62 BPM | SYSTOLIC BLOOD PRESSURE: 62 MMHG | HEIGHT: 63 IN | WEIGHT: 85 LBS | OXYGEN SATURATION: 98 % | DIASTOLIC BLOOD PRESSURE: 32 MMHG | BODY MASS INDEX: 15.06 KG/M2 | TEMPERATURE: 97.8 F | RESPIRATION RATE: 18 BRPM

## 2024-01-18 LAB
ABO + RH BLD: NORMAL
ALBUMIN SERPL-MCNC: 2.6 G/DL (ref 3.4–5)
ALBUMIN/GLOB SERPL: 0.6 (ref 0.8–1.7)
ALP SERPL-CCNC: 101 U/L (ref 45–117)
ALT SERPL-CCNC: 58 U/L (ref 13–56)
ANION GAP SERPL CALC-SCNC: 15 MMOL/L (ref 3–18)
AST SERPL-CCNC: 145 U/L (ref 10–38)
BASOPHILS # BLD: 0 K/UL (ref 0–0.1)
BASOPHILS NFR BLD: 0 % (ref 0–2)
BILIRUB SERPL-MCNC: 0.6 MG/DL (ref 0.2–1)
BLOOD GROUP ANTIBODIES SERPL: NORMAL
BUN SERPL-MCNC: 74 MG/DL (ref 7–18)
BUN/CREAT SERPL: 37 (ref 12–20)
CALCIUM SERPL-MCNC: 9.9 MG/DL (ref 8.5–10.1)
CHLORIDE SERPL-SCNC: 109 MMOL/L (ref 100–111)
CO2 SERPL-SCNC: 20 MMOL/L (ref 21–32)
CREAT SERPL-MCNC: 2.02 MG/DL (ref 0.6–1.3)
DIFFERENTIAL METHOD BLD: ABNORMAL
EKG ATRIAL RATE: 69 BPM
EKG DIAGNOSIS: NORMAL
EKG P AXIS: 52 DEGREES
EKG P-R INTERVAL: 182 MS
EKG Q-T INTERVAL: 462 MS
EKG QRS DURATION: 84 MS
EKG QTC CALCULATION (BAZETT): 495 MS
EKG R AXIS: 17 DEGREES
EKG T AXIS: 26 DEGREES
EKG VENTRICULAR RATE: 69 BPM
EOSINOPHIL # BLD: 0 K/UL (ref 0–0.4)
EOSINOPHIL NFR BLD: 0 % (ref 0–5)
ERYTHROCYTE [DISTWIDTH] IN BLOOD BY AUTOMATED COUNT: 15.6 % (ref 11.6–14.5)
FOLATE SERPL-MCNC: >20 NG/ML (ref 3.1–17.5)
GLOBULIN SER CALC-MCNC: 4.4 G/DL (ref 2–4)
GLUCOSE SERPL-MCNC: 158 MG/DL (ref 74–99)
HCT VFR BLD AUTO: 40.9 % (ref 35–45)
HGB BLD-MCNC: 12.8 G/DL (ref 12–16)
IMM GRANULOCYTES # BLD AUTO: 0 K/UL
IMM GRANULOCYTES NFR BLD AUTO: 0 %
LACTATE BLD-SCNC: 3.25 MMOL/L (ref 0.4–2)
LIPASE SERPL-CCNC: 21 U/L (ref 13–75)
LYMPHOCYTES # BLD: 0.2 K/UL (ref 0.9–3.6)
LYMPHOCYTES NFR BLD: 1 % (ref 21–52)
MCH RBC QN AUTO: 30.8 PG (ref 24–34)
MCHC RBC AUTO-ENTMCNC: 31.3 G/DL (ref 31–37)
MCV RBC AUTO: 98.6 FL (ref 78–100)
MONOCYTES # BLD: 0.4 K/UL (ref 0.05–1.2)
MONOCYTES NFR BLD: 2 % (ref 3–10)
NEUTS BAND NFR BLD MANUAL: 17 % (ref 0–5)
NEUTS SEG # BLD: 19.2 K/UL (ref 1.8–8)
NEUTS SEG NFR BLD: 80 % (ref 40–73)
NRBC # BLD: 0 K/UL (ref 0–0.01)
NRBC BLD-RTO: 0 PER 100 WBC
PLATELET # BLD AUTO: 256 K/UL (ref 135–420)
PLATELET COMMENT: ABNORMAL
PMV BLD AUTO: 11.6 FL (ref 9.2–11.8)
POTASSIUM SERPL-SCNC: 5.5 MMOL/L (ref 3.5–5.5)
PROT SERPL-MCNC: 7 G/DL (ref 6.4–8.2)
RBC # BLD AUTO: 4.15 M/UL (ref 4.2–5.3)
RBC MORPH BLD: ABNORMAL
SODIUM SERPL-SCNC: 144 MMOL/L (ref 136–145)
SPECIMEN EXP DATE BLD: NORMAL
TROPONIN I SERPL HS-MCNC: 358 NG/L (ref 0–54)
VIT B12 SERPL-MCNC: 1454 PG/ML (ref 211–911)
WBC # BLD AUTO: 19.8 K/UL (ref 4.6–13.2)
WBC MORPH BLD: ABNORMAL

## 2024-01-18 PROCEDURE — 93010 ELECTROCARDIOGRAM REPORT: CPT | Performed by: INTERNAL MEDICINE

## 2024-01-18 PROCEDURE — 96365 THER/PROPH/DIAG IV INF INIT: CPT

## 2024-01-18 PROCEDURE — 86901 BLOOD TYPING SEROLOGIC RH(D): CPT

## 2024-01-18 PROCEDURE — 82607 VITAMIN B-12: CPT

## 2024-01-18 PROCEDURE — 87040 BLOOD CULTURE FOR BACTERIA: CPT

## 2024-01-18 PROCEDURE — 83690 ASSAY OF LIPASE: CPT

## 2024-01-18 PROCEDURE — C9113 INJ PANTOPRAZOLE SODIUM, VIA: HCPCS | Performed by: STUDENT IN AN ORGANIZED HEALTH CARE EDUCATION/TRAINING PROGRAM

## 2024-01-18 PROCEDURE — 86900 BLOOD TYPING SEROLOGIC ABO: CPT

## 2024-01-18 PROCEDURE — 2580000003 HC RX 258: Performed by: EMERGENCY MEDICINE

## 2024-01-18 PROCEDURE — 2580000003 HC RX 258: Performed by: STUDENT IN AN ORGANIZED HEALTH CARE EDUCATION/TRAINING PROGRAM

## 2024-01-18 PROCEDURE — 96366 THER/PROPH/DIAG IV INF ADDON: CPT

## 2024-01-18 PROCEDURE — 71045 X-RAY EXAM CHEST 1 VIEW: CPT

## 2024-01-18 PROCEDURE — 82746 ASSAY OF FOLIC ACID SERUM: CPT

## 2024-01-18 PROCEDURE — 6360000002 HC RX W HCPCS: Performed by: EMERGENCY MEDICINE

## 2024-01-18 PROCEDURE — 0658 HSPC ROOM AND BOARD

## 2024-01-18 PROCEDURE — A4216 STERILE WATER/SALINE, 10 ML: HCPCS | Performed by: STUDENT IN AN ORGANIZED HEALTH CARE EDUCATION/TRAINING PROGRAM

## 2024-01-18 PROCEDURE — 96361 HYDRATE IV INFUSION ADD-ON: CPT

## 2024-01-18 PROCEDURE — G0299 HHS/HOSPICE OF RN EA 15 MIN: HCPCS

## 2024-01-18 PROCEDURE — 85025 COMPLETE CBC W/AUTO DIFF WBC: CPT

## 2024-01-18 PROCEDURE — 80053 COMPREHEN METABOLIC PANEL: CPT

## 2024-01-18 PROCEDURE — 96375 TX/PRO/DX INJ NEW DRUG ADDON: CPT

## 2024-01-18 PROCEDURE — 6360000002 HC RX W HCPCS: Performed by: STUDENT IN AN ORGANIZED HEALTH CARE EDUCATION/TRAINING PROGRAM

## 2024-01-18 PROCEDURE — 83605 ASSAY OF LACTIC ACID: CPT

## 2024-01-18 PROCEDURE — 0651 HSPC ROUTINE HOME CARE

## 2024-01-18 PROCEDURE — 2500000001 HSPC NON INJECTABLE MED

## 2024-01-18 PROCEDURE — 86850 RBC ANTIBODY SCREEN: CPT

## 2024-01-18 PROCEDURE — 84484 ASSAY OF TROPONIN QUANT: CPT

## 2024-01-18 RX ORDER — BISACODYL 10 MG
10 SUPPOSITORY, RECTAL RECTAL DAILY PRN
Qty: 5 SUPPOSITORY | Refills: 0 | Status: SHIPPED | OUTPATIENT
Start: 2024-01-18

## 2024-01-18 RX ORDER — ACETAMINOPHEN 650 MG/1
650 SUPPOSITORY RECTAL EVERY 4 HOURS PRN
Qty: 4 SUPPOSITORY | Refills: 0 | Status: SHIPPED | OUTPATIENT
Start: 2024-01-18

## 2024-01-18 RX ORDER — SODIUM CHLORIDE 0.9 % (FLUSH) 0.9 %
5-40 SYRINGE (ML) INJECTION PRN
Status: DISCONTINUED | OUTPATIENT
Start: 2024-01-18 | End: 2024-01-18 | Stop reason: HOSPADM

## 2024-01-18 RX ORDER — HYOSCYAMINE SULFATE 0.125 MG
125 TABLET ORAL EVERY 6 HOURS PRN
Qty: 10 TABLET | Refills: 0 | Status: SHIPPED | OUTPATIENT
Start: 2024-01-18

## 2024-01-18 RX ORDER — MORPHINE SULFATE 100 MG/5ML
5 SOLUTION ORAL
Qty: 30 ML | Refills: 0 | Status: SHIPPED | OUTPATIENT
Start: 2024-01-18 | End: 2024-02-17

## 2024-01-18 RX ORDER — SODIUM CHLORIDE 9 MG/ML
INJECTION, SOLUTION INTRAVENOUS PRN
Status: DISCONTINUED | OUTPATIENT
Start: 2024-01-18 | End: 2024-01-18 | Stop reason: HOSPADM

## 2024-01-18 RX ORDER — PANTOPRAZOLE SODIUM 40 MG/1
40 TABLET, DELAYED RELEASE ORAL 2 TIMES DAILY
Qty: 90 TABLET | Refills: 1 | Status: SHIPPED | OUTPATIENT
Start: 2024-01-18

## 2024-01-18 RX ORDER — LORAZEPAM 2 MG/ML
INJECTION INTRAMUSCULAR
Qty: 30 ML | Refills: 0 | Status: SHIPPED | OUTPATIENT
Start: 2024-01-18 | End: 2025-01-18

## 2024-01-18 RX ORDER — SODIUM CHLORIDE 0.9 % (FLUSH) 0.9 %
5-40 SYRINGE (ML) INJECTION EVERY 12 HOURS SCHEDULED
Status: DISCONTINUED | OUTPATIENT
Start: 2024-01-18 | End: 2024-01-18 | Stop reason: HOSPADM

## 2024-01-18 RX ORDER — ONDANSETRON 2 MG/ML
4 INJECTION INTRAMUSCULAR; INTRAVENOUS ONCE
Status: COMPLETED | OUTPATIENT
Start: 2024-01-18 | End: 2024-01-18

## 2024-01-18 RX ORDER — ONDANSETRON 4 MG/1
4 TABLET, ORALLY DISINTEGRATING ORAL EVERY 4 HOURS PRN
Qty: 21 TABLET | Refills: 0 | Status: SHIPPED | OUTPATIENT
Start: 2024-01-18

## 2024-01-18 RX ORDER — SODIUM CHLORIDE 9 MG/ML
INJECTION, SOLUTION INTRAVENOUS CONTINUOUS
Status: DISCONTINUED | OUTPATIENT
Start: 2024-01-18 | End: 2024-01-18 | Stop reason: HOSPADM

## 2024-01-18 RX ORDER — LEVOFLOXACIN 5 MG/ML
750 INJECTION, SOLUTION INTRAVENOUS ONCE
Status: COMPLETED | OUTPATIENT
Start: 2024-01-18 | End: 2024-01-18

## 2024-01-18 RX ORDER — 0.9 % SODIUM CHLORIDE 0.9 %
30 INTRAVENOUS SOLUTION INTRAVENOUS ONCE
Status: COMPLETED | OUTPATIENT
Start: 2024-01-18 | End: 2024-01-18

## 2024-01-18 RX ADMIN — SODIUM CHLORIDE 1158 ML: 9 INJECTION, SOLUTION INTRAVENOUS at 02:57

## 2024-01-18 RX ADMIN — WATER 1000 MG: 1 INJECTION INTRAMUSCULAR; INTRAVENOUS; SUBCUTANEOUS at 02:53

## 2024-01-18 RX ADMIN — PANTOPRAZOLE SODIUM 80 MG: 40 INJECTION, POWDER, FOR SOLUTION INTRAVENOUS at 08:17

## 2024-01-18 RX ADMIN — ONDANSETRON 4 MG: 2 INJECTION INTRAMUSCULAR; INTRAVENOUS at 08:16

## 2024-01-18 RX ADMIN — SODIUM CHLORIDE 1000 ML: 9 INJECTION, SOLUTION INTRAVENOUS at 00:10

## 2024-01-18 RX ADMIN — SODIUM CHLORIDE 1000 ML: 9 INJECTION, SOLUTION INTRAVENOUS at 01:04

## 2024-01-18 RX ADMIN — LEVOFLOXACIN 750 MG: 5 INJECTION, SOLUTION INTRAVENOUS at 02:57

## 2024-01-18 ASSESSMENT — ENCOUNTER SYMPTOMS
VOMITING: 0
SHORTNESS OF BREATH: 0
WHEEZING: 0
ABDOMINAL PAIN: 0

## 2024-01-18 ASSESSMENT — PAIN SCALES - PAIN ASSESSMENT IN ADVANCED DEMENTIA (PAINAD)
BREATHING: 0
BREATHING: 0
CONSOLABILITY: 0
FACIALEXPRESSION: 0
CONSOLABILITY: 0
FACIALEXPRESSION: 0
BODYLANGUAGE: 0
TOTALSCORE: 0
NEGVOCALIZATION: 0
NEGVOCALIZATION: 0
TOTALSCORE: 0
BODYLANGUAGE: 0

## 2024-01-18 ASSESSMENT — PAIN - FUNCTIONAL ASSESSMENT
PAIN_FUNCTIONAL_ASSESSMENT: PAIN ASSESSMENT IN ADVANCED DEMENTIA (PAINAD)
PAIN_FUNCTIONAL_ASSESSMENT: NONE - DENIES PAIN

## 2024-01-18 ASSESSMENT — LIFESTYLE VARIABLES
HOW OFTEN DO YOU HAVE A DRINK CONTAINING ALCOHOL: NEVER
HOW MANY STANDARD DRINKS CONTAINING ALCOHOL DO YOU HAVE ON A TYPICAL DAY: PATIENT DOES NOT DRINK

## 2024-01-18 NOTE — ED NOTES
Called [facility] spoke to Violeta. Provided patient information. Advised per attending requesting transport 12noon. Violeta stated she can have someone here between 12/12:30pm

## 2024-01-18 NOTE — ED NOTES
Pt started to vomit and it is dark red. Pt was cleaned , gown changed and sheets changed. Provider and family are aware.

## 2024-01-18 NOTE — PROGRESS NOTES
Provider:  Please send scripts for the following comfort medications with patient at discharge:        1. Morphine concentrate 20mg/ml  Give 0.25-1ml po/sl every 3 hours PRN for pain/air hunger  Quantity 30ml     2. Lorazepam oral solution 2mg/ml   Give 0.25 to 0.5ml (0.5 to 1mg) po/sl every 6 hrs PRN anxiety/agitation   Quantity 30 ml     3. Hyoscyamine 0.125mg tablets  Give 1 tab po/sl every 6 hours PRN terminal congestion  Quantity 10 tabs.      4.  Bisacodyl Suppository 10mg  Give one suppository rectally every day PRN  Quantity 5 suppositories     5. Acetaminophen Suppository 650mg  Give one suppository rectally every four (4)   hours PRN  Quantity 4 suppositories    Melva Perez MDIV, BSN, RN   Hospice Liaison   [address], [address] [place] [address]   Office: [phone]  Fax: [phone]   Mobile:  [phone]   Email: [email or web address]

## 2024-01-18 NOTE — PROGRESS NOTES
Chart reviewed.  Discussed patient with Dr. Pena and bedside RN.  Liaison spoke to patient's son Kemar Gimenez ([phone]) by phone.  Discussed [facility] philosophy, services, criteria, and IDT.  Son wishes to proceed with hospice following discharge.  Plan is for patient to return to [facility] today.  Son is agreeable to this.  Provided with 24/7 contact information.     Pt/family pursuing hospice: Yes   Admission dx approved by Hospice Medical Director:?(Pending)   Anticipated hospital d/c date:?1/18/2024  Discussion occurred with patient and/or family about preference of hospitalization once admitted to hospice:?Yes   Reviewed and discussed hospice philosophy and keeping the patient comfortable in their residence: Yes   Discussion with patient/family regarding around the clock caregiver in place due to patient safety in the home once discharged:?Yes   ?   DME:  Suction and oxygen will be delivered on 1/18 STAT.  Order [id number]      Provider:  Please send scripts for necessary home medications with patient at discharge.  ?   Comfort Medications:  Hospice provider will order the following comfort medications:    1. Morphine concentrate 20mg/ml   Give 0.25-1ml po/sl every 3 hours PRN for pain/air hunger   Quantity 30ml   ?   2. Lorazepam oral solution 2mg/ml    Give 0.25 to 0.5ml (0.5 to 1mg) po/sl every 6 hrs PRN anxiety/agitation    Quantity 30 ml   ?   3. Hyoscyamine 0.125mg tablets   Give 1 tab po/sl every 6 hours PRN terminal congestion   Quantity 10 tabs.    ?   4. ?Bisacodyl Suppository 10mg   Give one suppository rectally every day PRN   Quantity 5 suppositories   ?   5. Acetaminophen Suppository 650mg   Give one suppository rectally every four (4)    hours PRN   Quantity 4 suppositories   ?   ?   Thank you for the referral to [facility]. If we can be of further assistance please contact [phone].      Melva Perez MDIV, BSN, RN   Hospice Liaison   [facility]   [phone]

## 2024-01-18 NOTE — PROGRESS NOTES
visited with the family of Ashley Gimenez, who is a 89 y.o.,female.     The  provided the following Interventions:  Remotely initiated coordination with community clergy to give SOS to patient on comfort care at [facility]. Audrey Giraldo will be on his way to do the last rites.  Offered assurance of continued prayers on patient's behalf.     Plan:  Chaplains will continue to follow and will provide pastoral care on an as needed/requested basis.   recommends bedside caregivers page  on duty if patient shows signs of acute spiritual or emotional distress.     Sofía Vega, [facility]  Spiritual Care   [phone]

## 2024-01-18 NOTE — ED PROVIDER NOTES
11:39 AM :Pt care assumed from Dr. Mercado , ED provider. Pt complaint(s), current treatment plan, progression and available diagnostic results have been discussed thoroughly. The patient was seen and evaluated on my shift.   Rounding occurred: No  Intended Disposition: Discharge home under hospice care  Pending diagnostic reports and/or labs (please list): Nothing pending.  Asked by hospice RN to provide scripts at the request of the hospice physician.  The following prescriptions are provided:      Provider:  Please send scripts for the following comfort medications with patient at discharge:          1. Morphine concentrate 20mg/ml  Give 0.25-1ml po/sl every 3 hours PRN for pain/air hunger  Quantity 30ml     2. Lorazepam oral solution 2mg/ml   Give 0.25 to 0.5ml (0.5 to 1mg) po/sl every 6 hrs PRN anxiety/agitation   Quantity 30 ml     3. Hyoscyamine 0.125mg tablets  Give 1 tab po/sl every 6 hours PRN terminal congestion  Quantity 10 tabs.      4.  Bisacodyl Suppository 10mg  Give one suppository rectally every day PRN  Quantity 5 suppositories     5. Acetaminophen Suppository 650mg  Give one suppository rectally every four (4)   hours PRN  Quantity 4 suppositories        Written prescriptions for controlled substances provided at the request of the hospice provider      Alexis Pierson MD  01/18/24 6350       Alexis Pierson MD  01/18/24 4543    
Interval 462 ms    QTc Calculation (Bazett) 495 ms    P Axis 52 degrees    R Axis 17 degrees    T Axis 26 degrees    Diagnosis       Normal sinus rhythm  Prolonged QT  Abnormal ECG  When compared with ECG of 27-FEB-2020 16:10,  premature ventricular complexes are no longer present  premature supraventricular complexes are no longer present  T wave inversion less evident in Anterior leads     POCT lactic acid (lactate)    Collection Time: 01/17/24 11:48 PM   Result Value Ref Range    POC Lactic Acid 2.67 (HH) 0.40 - 2.00 mmol/L   CBC with Auto Differential    Collection Time: 01/18/24 12:05 AM   Result Value Ref Range    WBC 19.8 (H) 4.6 - 13.2 K/uL    RBC 4.15 (L) 4.20 - 5.30 M/uL    Hemoglobin 12.8 12.0 - 16.0 g/dL    Hematocrit 40.9 35.0 - 45.0 %    MCV 98.6 78.0 - 100.0 FL    MCH 30.8 24.0 - 34.0 PG    MCHC 31.3 31.0 - 37.0 g/dL    RDW 15.6 (H) 11.6 - 14.5 %    Platelets 256 135 - 420 K/uL    MPV 11.6 9.2 - 11.8 FL    Nucleated RBCs 0.0 0  WBC    nRBC 0.00 0.00 - 0.01 K/uL    Neutrophils % 80 (H) 40 - 73 %    Band Neutrophils 17 (H) 0 - 5 %    Lymphocytes % 1 (L) 21 - 52 %    Monocytes % 2 (L) 3 - 10 %    Eosinophils % 0 0 - 5 %    Basophils % 0 0 - 2 %    Immature Granulocytes 0 %    Neutrophils Absolute 19.2 (H) 1.8 - 8.0 K/UL    Lymphocytes Absolute 0.2 (L) 0.9 - 3.6 K/UL    Monocytes Absolute 0.4 0.05 - 1.2 K/UL    Eosinophils Absolute 0.0 0.0 - 0.4 K/UL    Basophils Absolute 0.0 0.0 - 0.1 K/UL    Absolute Immature Granulocyte 0.0 K/UL    Differential Type MANUAL      Platelet Comment ADEQUATE PLATELETS      RBC Comment OVALOCYTES  1+        WBC Comment VACUOLATED POLYS     Comprehensive Metabolic Panel    Collection Time: 01/18/24 12:05 AM   Result Value Ref Range    Sodium 144 136 - 145 mmol/L    Potassium 5.5 3.5 - 5.5 mmol/L    Chloride 109 100 - 111 mmol/L    CO2 20 (L) 21 - 32 mmol/L    Anion Gap 15 3.0 - 18 mmol/L    Glucose 158 (H) 74 - 99 mg/dL    BUN 74 (H) 7.0 - 18 MG/DL    Creatinine 2.02

## 2024-01-18 NOTE — ED NOTES
Pt son Kemar benítez was notify that patient is in the hospital and if he can come in to verify if patient is a DNR or a full code. Pt son stated \"that he is on the way but coming from [place]\".

## 2024-01-18 NOTE — ED NOTES
RN called [facility] [phone], option Lu. Spoke with Margo and was transferred to Anastasiia, left message with Anastasiia stating that Resident was coming back to their facility, transport set for , and that [facility] would be contacting the facility. Left call back number if they had questions.

## 2024-01-18 NOTE — ED NOTES
Patient signed out to me as a severe sepsis dying patient.  Current comfort measures only.  However upon reexamination patient seems to be improving, her mentation is improved and she is talking and communicating.  Discussed different treatment options available with the family.  They prefer admission to [facility].  Spoke with hospitalist who declines at this time.  Therefore family is agreed with setting up of hospice.  Patient is from [facility] and therefore hospice is arranged and patient be sent back to [facility].  Family in agreement at this time.  Still quite hypotensive.  She did have an episode of vomiting blood here in the emergency department and therefore she will be started on Protonix and Zofran.    Hospice care team at bedside.    Transportation arranged for noon to take the patient back to [facility] for hospice care.       Margo Mercado MD  01/18/24 7521

## 2024-01-18 NOTE — ED TRIAGE NOTES
[facility] hematem[facility]. EMS reports that [facility] staff reported pt throwing up \"coffee ground like vomit,\" and BP in route to our facility 70/40. /68 upon triage, noted bolus of fluids running by EMS, pt pale and cold to touch. MD made aware

## 2024-01-18 NOTE — PROGRESS NOTES
The son of our patient asked for the  because his mother is now on Comfort Care measures.  The patient has not spoken, but she has the ability to more her head, and her limbs.  If if is possible to have a  or nun provide last rites sacrament it would be a great service to the patient and the family.      Chaplain Hurt   [phone] - Office

## 2024-01-19 ENCOUNTER — HOME CARE VISIT (OUTPATIENT)
Age: 89
End: 2024-01-19
Payer: MEDICARE

## 2024-01-19 PROCEDURE — 0658 HSPC ROOM AND BOARD

## 2024-01-19 PROCEDURE — 0651 HSPC ROUTINE HOME CARE

## 2024-01-19 PROCEDURE — G0299 HHS/HOSPICE OF RN EA 15 MIN: HCPCS

## 2024-01-19 ASSESSMENT — ENCOUNTER SYMPTOMS
HEMOPTYSIS: 0
BLOOD IN STOOL: 1
VOMITING: 1
BOWEL INCONTINENCE: 1
NAUSEA: 1

## 2024-01-19 NOTE — HOSPICE
Ashley Gimenez   : 1926   [facility]   [address]    [facility] [address]   DNR   Primary Hospice Dx: ? advanced Alzheimer's dementia   Secondary Hospice Dx: debility, appetite loss, DVT, recent hematemesis, anemia, hypertension, CKD    Hospice related comorbid conditions: osteoporosis, dyslipidemia   DME: ? Suction and oxygen will be delivered  STAT.    Hospice Admission:?2024     Open to all services   was career [employer]. w[facility]  home for local arrangements

## 2024-01-19 NOTE — HOSPICE
Requested:  yes  MSW Requested:yes   Requested:yes  Volunteer Services Requested:  yes  Bereavement risk/contact:  Low/ son Kemar  Patient/family/caregiver specific end of life goal:  none  Training and education provided this visit:  hospice orientation, end of life care  Plan for next visit:  monitor GI symptoms  Care coordination with Medical Director, IDG, and family regarding admission to hospice and all are in agreement with plan of care.

## 2024-01-20 PROCEDURE — 0658 HSPC ROOM AND BOARD

## 2024-01-20 PROCEDURE — 0651 HSPC ROUTINE HOME CARE

## 2024-01-21 PROCEDURE — 0651 HSPC ROUTINE HOME CARE

## 2024-01-21 PROCEDURE — 0658 HSPC ROOM AND BOARD

## 2024-01-22 ENCOUNTER — HOME CARE VISIT (OUTPATIENT)
Age: 89
End: 2024-01-22
Payer: MEDICARE

## 2024-01-22 PROCEDURE — 0658 HSPC ROOM AND BOARD

## 2024-01-22 PROCEDURE — 0651 HSPC ROUTINE HOME CARE

## 2024-01-22 NOTE — HOSPICE
Patient was up playing with her dolls. She was aware that I was in the room but not much interaction with .  will continue to follow.

## 2024-01-23 ENCOUNTER — HOME CARE VISIT (OUTPATIENT)
Age: 89
End: 2024-01-23
Payer: MEDICARE

## 2024-01-23 PROCEDURE — 0658 HSPC ROOM AND BOARD

## 2024-01-23 PROCEDURE — 0651 HSPC ROUTINE HOME CARE

## 2024-01-23 NOTE — HOSPICE
Patient/Caregiver/Family/Facility findings/issues during SN visit:  pt mooved to  r/t roomate issues. Pt in bed with eyes open. non-verbal. gdtr and DIL' sister visiting. They fed her 2 containers of baby food. No dysphagia reported.     Medication refills ordered this visit: None    Medications reconciled and all medications are available in the home this visit.  The following education was provided regarding medications, medication interactions, and look alike medications.  Response to teaching: Facility staff verbalize understanding. Medications are effective at this time.      Supplies by type and quantity ordered this visit include: none    Consulted medical director/attending physician regarding: N/A    Instructed patient/family/caregiver on 24-hour hospice availability and phone number.    Plan for next visit:  monitor GI s/sx

## 2024-01-23 NOTE — HOSPICE
Reason for today's visit is to complete assessment. Pt is a 89 year old female, admitted to hospice on 1-18-24 with Alzheimer's Dentia. MSW places call to MPOA son Kemar, as pt is unable to accurately report. Son states that pt has all final planning in place, including a Living Will. Kemar reports that his sister was here this weekend, as they thought pt would be passing. Kemar states that pt is in a room with another hospice pt and that is going well. Kemar feels at this time that MSW support will not be needed. MSW encourages son to call hospice if that need changes, as MSW is here to support pt/family, son verbalizes understanding.

## 2024-01-24 ENCOUNTER — HOME CARE VISIT (OUTPATIENT)
Age: 89
End: 2024-01-24
Payer: MEDICARE

## 2024-01-24 VITALS
DIASTOLIC BLOOD PRESSURE: 45 MMHG | RESPIRATION RATE: 18 BRPM | SYSTOLIC BLOOD PRESSURE: 89 MMHG | TEMPERATURE: 98.9 F | HEART RATE: 70 BPM | OXYGEN SATURATION: 98 %

## 2024-01-24 PROCEDURE — 0658 HSPC ROOM AND BOARD

## 2024-01-24 PROCEDURE — G0299 HHS/HOSPICE OF RN EA 15 MIN: HCPCS

## 2024-01-24 PROCEDURE — 0651 HSPC ROUTINE HOME CARE

## 2024-01-25 PROCEDURE — 0651 HSPC ROUTINE HOME CARE

## 2024-01-25 PROCEDURE — 0658 HSPC ROOM AND BOARD

## 2024-01-25 ASSESSMENT — ENCOUNTER SYMPTOMS: BOWEL INCONTINENCE: 1

## 2024-01-25 NOTE — HOSPICE
Patient/Caregiver/Family/Facility findings/issues during SN visit:  no changes    Medication refills ordered this visit: none needed    Medications reconciled and all medications are available in the home this visit.  The following education was provided regarding medications, medication interactions, and look alike medications.  Response to teaching: facility verbalized understanding. Medications are effective at this time.      Supplies by type and quantity ordered this visit include: none needed    Consulted medical director/attending physician regarding: N/A    Instructed patient/family/caregiver on 24-hour hospice availability and phone number.    Plan for next visit:  monitor GI status, aspiration precautions

## 2024-01-26 ENCOUNTER — HOME CARE VISIT (OUTPATIENT)
Age: 89
End: 2024-01-26
Payer: MEDICARE

## 2024-01-26 VITALS
SYSTOLIC BLOOD PRESSURE: 89 MMHG | DIASTOLIC BLOOD PRESSURE: 43 MMHG | TEMPERATURE: 98.1 F | RESPIRATION RATE: 16 BRPM | HEART RATE: 88 BPM | OXYGEN SATURATION: 95 %

## 2024-01-26 PROCEDURE — 0651 HSPC ROUTINE HOME CARE

## 2024-01-26 PROCEDURE — 0658 HSPC ROOM AND BOARD

## 2024-01-26 PROCEDURE — G0300 HHS/HOSPICE OF LPN EA 15 MIN: HCPCS

## 2024-01-26 ASSESSMENT — ENCOUNTER SYMPTOMS: BOWEL INCONTINENCE: 1

## 2024-01-26 NOTE — HOSPICE
Patient/Caregiver/Family/Facility findings/issues during SN visit:  No new concerns at this time.    Medication refills ordered this visit: No refills needed    Medications reconciled and all medications are available in the home this visit.  The following education was provided regarding medications, medication interactions, and look alike medications: Medication side effects, dosages, purposes, frequencies.  Response to teaching: Verbalized understanding. Medications are effective at this time.      Supplies by type and quantity ordered this visit include: Not needed    Consulted medical director/attending physician regarding: Not needed    Instructed patient/family/caregiver on 24-hour hospice availability and phone number.    Plan for next visit:  Continue end of life education and support caregiver.

## 2024-01-27 PROCEDURE — 0658 HSPC ROOM AND BOARD

## 2024-01-27 PROCEDURE — 0651 HSPC ROUTINE HOME CARE

## 2024-01-28 PROCEDURE — 0651 HSPC ROUTINE HOME CARE

## 2024-01-28 PROCEDURE — 0658 HSPC ROOM AND BOARD

## 2024-01-29 ENCOUNTER — HOME CARE VISIT (OUTPATIENT)
Age: 89
End: 2024-01-29
Payer: MEDICARE

## 2024-01-29 PROCEDURE — 0658 HSPC ROOM AND BOARD

## 2024-01-29 PROCEDURE — 0651 HSPC ROUTINE HOME CARE

## 2024-01-29 PROCEDURE — G0156 HHCP-SVS OF AIDE,EA 15 MIN: HCPCS

## 2024-01-30 ENCOUNTER — HOME CARE VISIT (OUTPATIENT)
Age: 89
End: 2024-01-30
Payer: MEDICARE

## 2024-01-30 VITALS
TEMPERATURE: 98.3 F | SYSTOLIC BLOOD PRESSURE: 100 MMHG | DIASTOLIC BLOOD PRESSURE: 62 MMHG | RESPIRATION RATE: 16 BRPM | OXYGEN SATURATION: 92 % | HEART RATE: 88 BPM

## 2024-01-30 PROCEDURE — 0658 HSPC ROOM AND BOARD

## 2024-01-30 PROCEDURE — 0651 HSPC ROUTINE HOME CARE

## 2024-01-30 PROCEDURE — G0299 HHS/HOSPICE OF RN EA 15 MIN: HCPCS

## 2024-01-30 ASSESSMENT — ENCOUNTER SYMPTOMS
STOOL DESCRIPTION: SOFT FORMED
BOWEL INCONTINENCE: 1
HEMOPTYSIS: 0

## 2024-01-30 NOTE — HOSPICE
Patient/Caregiver/Family/Facility findings/issues during SN visit:  Ptient alone in room at time of visit.  She is in bed with head elevated.  She is pleasant and responds to verbal stimuli.  Talks of monkeys and other people in room.  Denies any discomfort.  Staff reports no BM x 3 days and will implement bowel regimen in place.  Appetite is poor- Drank 1/2 Ensure in my presence without difficulty.  No abnormalities or falls reported by Rachel- nurse    Medication refills ordered this visit: no needs    Medications reconciled and all medications are available in the home this visit.  The following education was provided regarding medications, medication interactions, and look alike medications: reviewed comfort meds   Response to teaching: verbalized understanding Medications are effective at this time.      Supplies by type and quantity ordered this visit include: Large briefs, chux pads    Consulted medical director/attending physician regarding: no needs    Instructed patient/family/caregiver on 24-hour hospice availability and phone number.    Plan for next visit:  assess comforrt and needs

## 2024-01-31 PROCEDURE — 0658 HSPC ROOM AND BOARD

## 2024-01-31 PROCEDURE — 0651 HSPC ROUTINE HOME CARE

## 2024-02-01 ENCOUNTER — HOME CARE VISIT (OUTPATIENT)
Age: 89
End: 2024-02-01
Payer: MEDICARE

## 2024-02-01 PROCEDURE — 0651 HSPC ROUTINE HOME CARE

## 2024-02-01 PROCEDURE — G0156 HHCP-SVS OF AIDE,EA 15 MIN: HCPCS

## 2024-02-01 PROCEDURE — 0658 HSPC ROOM AND BOARD

## 2024-02-02 ENCOUNTER — HOME CARE VISIT (OUTPATIENT)
Age: 89
End: 2024-02-02
Payer: MEDICARE

## 2024-02-02 VITALS
TEMPERATURE: 98 F | OXYGEN SATURATION: 92 % | SYSTOLIC BLOOD PRESSURE: 99 MMHG | HEART RATE: 81 BPM | DIASTOLIC BLOOD PRESSURE: 59 MMHG | RESPIRATION RATE: 16 BRPM

## 2024-02-02 PROCEDURE — G0299 HHS/HOSPICE OF RN EA 15 MIN: HCPCS

## 2024-02-02 PROCEDURE — 0658 HSPC ROOM AND BOARD

## 2024-02-02 PROCEDURE — 0651 HSPC ROUTINE HOME CARE

## 2024-02-03 PROCEDURE — 0658 HSPC ROOM AND BOARD

## 2024-02-03 PROCEDURE — 0651 HSPC ROUTINE HOME CARE

## 2024-02-04 PROCEDURE — 0651 HSPC ROUTINE HOME CARE

## 2024-02-04 PROCEDURE — 0658 HSPC ROOM AND BOARD

## 2024-02-05 ENCOUNTER — HOME CARE VISIT (OUTPATIENT)
Age: 89
End: 2024-02-05
Payer: MEDICARE

## 2024-02-05 NOTE — HOSPICE
Patient/Caregiver/Family/Facility findings/issues during SN visit:  no changes or concerns per facility nurse Alxeandra    Medication refills ordered this visit: none needed    Medications reconciled and all medications are available in the home this visit.  The following education was provided regarding medications, medication interactions, and look alike medications.  Response to teaching: Alexandra facility nurse verbalized understanding. Medications are effective at this time.      Supplies by type and quantity ordered this visit include: none needed    Consulted medical director/attending physician regarding: N/A    Instructed patient/family/caregiver on 24-hour hospice availability and phone number.    Plan for next visit:  aspiration precautions

## 2024-02-06 ENCOUNTER — HOME CARE VISIT (OUTPATIENT)
Age: 89
End: 2024-02-06
Payer: MEDICARE

## 2024-02-06 VITALS
HEART RATE: 88 BPM | RESPIRATION RATE: 16 BRPM | TEMPERATURE: 97.9 F | OXYGEN SATURATION: 91 % | SYSTOLIC BLOOD PRESSURE: 98 MMHG | DIASTOLIC BLOOD PRESSURE: 54 MMHG

## 2024-02-06 PROCEDURE — G0299 HHS/HOSPICE OF RN EA 15 MIN: HCPCS

## 2024-02-07 NOTE — HOSPICE
Patient/Caregiver/Family/Facility findings/issues during SN visit:  no concerns. Drank a whole ensure per son Kemar    Medication refills ordered this visit: none needed    Medications reconciled and all medications are available in the home this visit.  The following education was provided regarding medications, medication interactions, and look alike medications Response to teaching: facility. Medications are effective effective at this time.      Supplies by type and quantity ordered this visit include: none needed    Consulted medical director/attending physician regarding: N/A    Instructed patient/family/caregiver on 24-hour hospice availability and phone number.    Plan for next visit:  aspiration precautions

## 2024-02-09 ENCOUNTER — HOME CARE VISIT (OUTPATIENT)
Age: 89
End: 2024-02-09
Payer: MEDICARE

## 2024-02-09 PROCEDURE — G0299 HHS/HOSPICE OF RN EA 15 MIN: HCPCS

## 2024-02-09 NOTE — HOSPICE
Patient was in bed resting. She responded to voice but went right back to sleep.  will continue to follow.

## 2024-02-12 ENCOUNTER — HOME CARE VISIT (OUTPATIENT)
Age: 89
End: 2024-02-12
Payer: MEDICARE

## 2024-02-12 PROCEDURE — G0156 HHCP-SVS OF AIDE,EA 15 MIN: HCPCS

## 2024-02-13 ENCOUNTER — HOME CARE VISIT (OUTPATIENT)
Age: 89
End: 2024-02-13
Payer: MEDICARE

## 2024-02-13 VITALS — HEART RATE: 81 BPM | OXYGEN SATURATION: 92 % | RESPIRATION RATE: 16 BRPM | TEMPERATURE: 97.9 F

## 2024-02-13 PROCEDURE — G0299 HHS/HOSPICE OF RN EA 15 MIN: HCPCS

## 2024-02-13 NOTE — HOSPICE
Patient/Caregiver/Family  findings/issues during SN visit:  no needs or concerns    Medication refills ordered this visit: none needed    Medications reconciled and all medications are available in the home this visit.  The following education was provided regarding medications, medication interactions, and look alike medications.  Response to teaching: facility staff verbalized understanding. Medications are effective at this time.      Supplies by type and quantity ordered this visit include: none needed    Consulted medical director/attending physician regarding: N/A    Instructed patient/family/caregiver on 24-hour hospice availability and phone number.    Plan for next visit:  aspiration precautions

## 2024-02-14 NOTE — HOSPICE
Patient/Caregiver/Family/Facility findings/issues during SN visit:  no needs or concerns from staff or son Kemar    Medication refills ordered this visit: none needed    Medications reconciled and all medications are available in the home this visit.  The following education was provided regarding medications, medication interactions, and look alike medications.  Response to teaching: staff verbalized understanding. Medications are effective at this time.      Supplies by type and quantity ordered this visit include: none needed    Consulted medical director/attending physician regarding: N/A    Instructed patient/family/caregiver on 24-hour hospice availability and phone number.    Plan for next visit:  aspiration precautions

## 2024-02-15 ENCOUNTER — HOME CARE VISIT (OUTPATIENT)
Age: 89
End: 2024-02-15
Payer: MEDICARE

## 2024-02-15 PROCEDURE — G0156 HHCP-SVS OF AIDE,EA 15 MIN: HCPCS

## 2024-02-19 ENCOUNTER — HOME CARE VISIT (OUTPATIENT)
Age: 89
End: 2024-02-19
Payer: MEDICARE

## 2024-02-19 VITALS
HEART RATE: 88 BPM | OXYGEN SATURATION: 93 % | SYSTOLIC BLOOD PRESSURE: 111 MMHG | TEMPERATURE: 97 F | DIASTOLIC BLOOD PRESSURE: 55 MMHG | RESPIRATION RATE: 16 BRPM

## 2024-02-19 PROCEDURE — G0156 HHCP-SVS OF AIDE,EA 15 MIN: HCPCS

## 2024-02-19 PROCEDURE — G0299 HHS/HOSPICE OF RN EA 15 MIN: HCPCS

## 2024-02-20 NOTE — HOSPICE
Patient/Caregiver/Family/Facility findings/issues during SN visit:  no needs or concerns, updated son Kemar    Medication refills ordered this visit: none    Medications reconciled and all medications are available in the home this visit.  The following education was provided regarding medications, medication interactions, and look alike medications.  Response to teaching: facility staff. Medications are effective at this time.      Supplies by type and quantity ordered this visit include: none needed    Consulted medical director/attending physician regarding: N/A    Instructed patient/family/caregiver on 24-hour hospice availability and phone number.    Plan for next visit:  monitor nutritional status, aspiration precautions

## 2024-02-22 ENCOUNTER — HOME CARE VISIT (OUTPATIENT)
Age: 89
End: 2024-02-22
Payer: MEDICARE

## 2024-02-22 VITALS
DIASTOLIC BLOOD PRESSURE: 50 MMHG | SYSTOLIC BLOOD PRESSURE: 80 MMHG | RESPIRATION RATE: 17 BRPM | TEMPERATURE: 97.5 F | HEART RATE: 91 BPM | OXYGEN SATURATION: 93 %

## 2024-02-22 PROCEDURE — G0299 HHS/HOSPICE OF RN EA 15 MIN: HCPCS

## 2024-02-22 ASSESSMENT — ENCOUNTER SYMPTOMS: SKIN LESIONS: 1

## 2024-02-22 NOTE — HOSPICE
Patient/Caregiver/Family/Facility findings/issues during SN visit:  n/a    Medication refills ordered this visit: none needed    Medications reconciled and all medications are available in the home this visit.  The following education was provided regarding medications, medication interactions, and look alike medications.  Response to teaching: caregiver verbalized understanding. Medications are effective at this time.      Supplies by type and quantity ordered this visit include: cream, wipes, breifs, gloves, mouth swabs, body wash Order Number : [id number]    Consulted medical director/attending physician regarding: n/a    Instructed patient/family/caregiver on 24-hour hospice availability and phone number.    Plan for next visit:  follow up

## 2024-02-26 ENCOUNTER — HOME CARE VISIT (OUTPATIENT)
Age: 89
End: 2024-02-26
Payer: MEDICARE

## 2024-02-27 ENCOUNTER — HOME CARE VISIT (OUTPATIENT)
Age: 89
End: 2024-02-27
Payer: MEDICARE

## 2024-02-27 PROCEDURE — G0156 HHCP-SVS OF AIDE,EA 15 MIN: HCPCS

## 2024-02-29 ENCOUNTER — HOME CARE VISIT (OUTPATIENT)
Age: 89
End: 2024-02-29
Payer: MEDICARE

## 2024-02-29 VITALS — RESPIRATION RATE: 16 BRPM | TEMPERATURE: 97.9 F | OXYGEN SATURATION: 94 % | HEART RATE: 89 BPM

## 2024-02-29 PROCEDURE — G0299 HHS/HOSPICE OF RN EA 15 MIN: HCPCS

## 2024-02-29 PROCEDURE — G0156 HHCP-SVS OF AIDE,EA 15 MIN: HCPCS

## 2024-03-01 ENCOUNTER — HOME CARE VISIT (OUTPATIENT)
Age: 89
End: 2024-03-01
Payer: MEDICARE

## 2024-03-01 NOTE — HOSPICE
Patient was in her room resting. She thought that she was rubbing some cream on herself but that was not cream. She told me to have a blessed day. Patient was not really aware of what she was saying but the  will continue to follow.

## 2024-03-01 NOTE — HOSPICE
Patient/Caregiver/Family/Facility findings/issues during SN visit:  no needs or concerns, updated son Kemar    Medication refills ordered this visit: none needed    Medications reconciled and all medications are available in the home this visit.  The following education was provided regarding medications, medication interactions, and look alike medications.  Response to teaching, Kemar son verbalized understanding. Medications are effective at this time.      Supplies by type and quantity ordered this visit include: none needed    Consulted medical director/attending physician regarding: N/A    Instructed patient/family/caregiver on 24-hour hospice availability and phone number.    Plan for next visit:  monitor nutritional status

## 2024-03-04 ENCOUNTER — HOME CARE VISIT (OUTPATIENT)
Age: 89
End: 2024-03-04
Payer: MEDICARE

## 2024-03-05 ENCOUNTER — HOME CARE VISIT (OUTPATIENT)
Age: 89
End: 2024-03-05
Payer: MEDICARE

## 2024-03-05 PROCEDURE — G0299 HHS/HOSPICE OF RN EA 15 MIN: HCPCS

## 2024-03-06 ASSESSMENT — ENCOUNTER SYMPTOMS: BOWEL INCONTINENCE: 1

## 2024-03-06 NOTE — HOSPICE
Patient/Caregiver/Family/Facility findings/issues during SN visit:  no concerns    Medication refills ordered this visit: none needed    Medications reconciled and all medications are available in the home this visit.  The following education was provided regarding medications, medication interactions, and look alike medications.  Response to teaching: sofi Reinoso verbalized understanding. Medications are effective at this time.      Supplies by type and quantity ordered this visit include: none    Consulted medical director/attending physician regarding: N/A    Instructed patient/family/caregiver on 24-hour hospice availability and phone number.    Plan for next visit:  monitor nutritional status

## 2024-03-07 ENCOUNTER — HOME CARE VISIT (OUTPATIENT)
Age: 89
End: 2024-03-07
Payer: MEDICARE

## 2024-03-07 PROCEDURE — G0156 HHCP-SVS OF AIDE,EA 15 MIN: HCPCS

## 2024-03-08 ENCOUNTER — HOME CARE VISIT (OUTPATIENT)
Age: 89
End: 2024-03-08
Payer: MEDICARE

## 2024-03-08 VITALS
OXYGEN SATURATION: 95 % | DIASTOLIC BLOOD PRESSURE: 52 MMHG | HEART RATE: 92 BPM | RESPIRATION RATE: 18 BRPM | TEMPERATURE: 97.7 F | SYSTOLIC BLOOD PRESSURE: 90 MMHG

## 2024-03-08 PROCEDURE — G0299 HHS/HOSPICE OF RN EA 15 MIN: HCPCS

## 2024-03-09 ASSESSMENT — ENCOUNTER SYMPTOMS
BOWEL INCONTINENCE: 1
SKIN LESIONS: 1

## 2024-03-09 NOTE — HOSPICE
Patient/Caregiver/Family/Facility findings/issues during SN visit:  Cecy LEWIS stated pt still doesnt eat but drinks \"plenty\" , nonstagable wound to L heel, foam boots ordered    Medication refills ordered this visit: none needed    Medications reconciled and all medications are available in the home this visit.  The following education was provided regarding medications, medication interactions, and look alike medications.  Response to teaching: facility staff. Medications are effective at this time.      Supplies by type and quantity ordered this visit include: none needed    Consulted medical director/attending physician regarding: N/A    Instructed patient/family/caregiver on 24-hour hospice availability and phone number.    Plan for next visit:  monitor skin

## 2024-03-11 ENCOUNTER — HOME CARE VISIT (OUTPATIENT)
Age: 89
End: 2024-03-11
Payer: MEDICARE

## 2024-03-11 PROCEDURE — G0156 HHCP-SVS OF AIDE,EA 15 MIN: HCPCS

## 2024-03-12 ENCOUNTER — HOME CARE VISIT (OUTPATIENT)
Age: 89
End: 2024-03-12
Payer: MEDICARE

## 2024-03-12 PROCEDURE — G0299 HHS/HOSPICE OF RN EA 15 MIN: HCPCS

## 2024-03-14 ENCOUNTER — HOME CARE VISIT (OUTPATIENT)
Age: 89
End: 2024-03-14
Payer: MEDICARE

## 2024-03-14 PROCEDURE — G0156 HHCP-SVS OF AIDE,EA 15 MIN: HCPCS

## 2024-03-15 ENCOUNTER — HOME CARE VISIT (OUTPATIENT)
Age: 89
End: 2024-03-15
Payer: MEDICARE

## 2024-03-15 VITALS
OXYGEN SATURATION: 95 % | SYSTOLIC BLOOD PRESSURE: 82 MMHG | DIASTOLIC BLOOD PRESSURE: 54 MMHG | RESPIRATION RATE: 16 BRPM | HEART RATE: 90 BPM | TEMPERATURE: 98.8 F

## 2024-03-15 PROCEDURE — G0300 HHS/HOSPICE OF LPN EA 15 MIN: HCPCS

## 2024-03-15 ASSESSMENT — ENCOUNTER SYMPTOMS
BOWEL INCONTINENCE: 1
SKIN LESIONS: 1

## 2024-03-15 NOTE — HOSPICE
Patient/Caregiver/Family/Facility findings/issues during SN visit: Patient presents sitting up in her bed eating jello.  No signs of distress or discofort.  Patient denies pain.  Vitals within normal limits.  No new needs per staff nurse  Lexis Zuniga    Medication refills ordered this visit: No refills needed    Medications reconciled and all medications are available in the home this visit.  The following education was provided regarding medications, medication interactions, and look alike medications: Medication side effects, dosages, purposes, frequencies.  Response to teaching: Verbalized understanding. Medications are effective at this time.      Supplies by type and quantity ordered this visit include: Not needed    Consulted medical director/attending physician regarding: Not needed    Instructed patient/family/caregiver on 24-hour hospice availability and phone number.    Plan for next visit:  Continue end of life education and support caregiver.

## 2024-03-18 ENCOUNTER — HOME CARE VISIT (OUTPATIENT)
Age: 89
End: 2024-03-18
Payer: MEDICARE

## 2024-03-18 PROCEDURE — G0156 HHCP-SVS OF AIDE,EA 15 MIN: HCPCS

## 2024-03-19 ENCOUNTER — HOME CARE VISIT (OUTPATIENT)
Age: 89
End: 2024-03-19
Payer: MEDICARE

## 2024-03-19 PROCEDURE — G0299 HHS/HOSPICE OF RN EA 15 MIN: HCPCS

## 2024-03-19 NOTE — HOSPICE
Patient/Caregiver/Family/Facility findings/issues during SN visit:  no needs    Medication refills ordered this visit: none needed    Medications reconciled and all medications are available in the home this visit.  The following education was provided regarding medications, medication interactions, and look alike medications.  Response to teaching: facility verbalized understanding. Medications are effective at this time.      Supplies by type and quantity ordered this visit include: none    Consulted medical director/attending physician regarding: N/A    Instructed patient/family/caregiver on 24-hour hospice availability and phone number.    Plan for next visit:  monitor skin

## 2024-03-21 ENCOUNTER — HOME CARE VISIT (OUTPATIENT)
Age: 89
End: 2024-03-21
Payer: MEDICARE

## 2024-03-21 PROCEDURE — G0156 HHCP-SVS OF AIDE,EA 15 MIN: HCPCS

## 2024-03-22 ENCOUNTER — HOME CARE VISIT (OUTPATIENT)
Age: 89
End: 2024-03-22
Payer: MEDICARE

## 2024-03-22 VITALS
TEMPERATURE: 97 F | RESPIRATION RATE: 16 BRPM | OXYGEN SATURATION: 94 % | HEART RATE: 70 BPM | DIASTOLIC BLOOD PRESSURE: 52 MMHG | SYSTOLIC BLOOD PRESSURE: 89 MMHG

## 2024-03-22 PROCEDURE — G0299 HHS/HOSPICE OF RN EA 15 MIN: HCPCS

## 2024-03-22 ASSESSMENT — ENCOUNTER SYMPTOMS: HEMOPTYSIS: 0

## 2024-03-22 NOTE — HOSPICE
Patient/Caregiver/Family/Facility findings/issues during SN visit:  N/A    Medication refills ordered this visit:N/A    Medications reconciled and all medications are available in the home this visit. Medications are effective at this time.      Supplies by type and quantity ordered this visit include: N/A    Consulted medical director/attending physician regarding: N/A    Instructed patient/family/caregiver on 24-hour hospice availability and phone number.Nursing staff, knows the number to call, for Hospice.    Plan for next visit: Will follow up with Cm next week.  Arrived at patients room, patient was sleeping. Patient was easily awakened by verbal calling her name. Patient was very pleasant, however confused. patient did not seem to be in any distress. Patient told me her name, and stated that she was not in any pain.  Vitals were taken, all were wnl. Talked with patients Nurse, she talked with me about patient stated that it has been a couple of days since patient has had a BM. Will continue to Monitor. Tried to get nurse to sign, Computer was having issues turning on.

## 2024-03-25 ENCOUNTER — HOME CARE VISIT (OUTPATIENT)
Age: 89
End: 2024-03-25
Payer: MEDICARE

## 2024-03-25 PROCEDURE — G0156 HHCP-SVS OF AIDE,EA 15 MIN: HCPCS

## 2024-03-26 ENCOUNTER — HOME CARE VISIT (OUTPATIENT)
Age: 89
End: 2024-03-26
Payer: MEDICARE

## 2024-03-26 VITALS
SYSTOLIC BLOOD PRESSURE: 91 MMHG | HEART RATE: 64 BPM | OXYGEN SATURATION: 94 % | RESPIRATION RATE: 16 BRPM | DIASTOLIC BLOOD PRESSURE: 50 MMHG | TEMPERATURE: 97.2 F

## 2024-03-26 PROCEDURE — G0299 HHS/HOSPICE OF RN EA 15 MIN: HCPCS

## 2024-03-26 ASSESSMENT — ENCOUNTER SYMPTOMS
BOWEL INCONTINENCE: 1
SKIN LESIONS: 1

## 2024-03-26 NOTE — HOSPICE
Mrs. Ashley Gimenez, 89 year old female, admitted to Hospice services for a terminal diagnosis of advanced Alzheimer's dementia. Patient has elected hospice services and is no longer seeking aggressive treatment. Co-morbidities related to the terminal diagnosis are debility, appetite loss, DVT, recent hematemesis, anemia, hypertension, CKD. Patient also has a past medical history of osteoporosis, dyslipidemia.   Mrs. Gimenez's status 6 months prior to hospice admission Pt was A&Ox2, Able to be transfered to , incont B&B, soft diet, self feed 75% TID, wt 94 lbs, 3 MO ago pt confused, unable to communicate needs, BLE contractures, incont B&B, GI bleed, coffee ground emesis, pureed diet, total feed. No appetite. wt 85 lbs now per son, NOW pt speaks non-sensical speach, increased agitation with ADL's, total care, R heel unstagable wound 2x2x0.1cm, thickened liquids, <25% BID pureed diet, total feed  Medical Director: Dr Hermelindo Love   Level of Care: routine   Advance Directives: DNR   Allergies: NKDA   LMAC:3 MO ago 22.2 cm ; NOW 19cm  Weight: 85 lbs   PPS: 30%  FAST: 7D   Sleeps 16-18 HRS  Tobacco usage: N/A   Functional status: total care   Infection: N/A   Bowel Regimen: dulcolax   Lines, Drains, or Airways present: N/A   Wounds present: new R heel unstagable wound

## 2024-03-26 NOTE — HOSPICE
Patient/Caregiver/Family/Facility findings/issues during SN visit:  no needs or concerns, son Kemar updated    Medication refills ordered this visit: none needed    Medications reconciled and all medications are available in the home this visit.  The following education was provided regarding medications, medication interactions, and look alike medications.  Response to teaching: facility verbalizes understanding. Medications are effective at this time.      Supplies by type and quantity ordered this visit include: none needed    Consulted medical director/attending physician regarding: N/A    Instructed patient/family/caregiver on 24-hour hospice availability and phone number.    Plan for next visit:  monitor neuro status

## 2024-03-28 ENCOUNTER — HOME CARE VISIT (OUTPATIENT)
Age: 89
End: 2024-03-28
Payer: MEDICARE

## 2024-03-28 PROCEDURE — G0156 HHCP-SVS OF AIDE,EA 15 MIN: HCPCS

## 2024-03-28 PROCEDURE — G0299 HHS/HOSPICE OF RN EA 15 MIN: HCPCS

## 2024-04-01 ENCOUNTER — HOME CARE VISIT (OUTPATIENT)
Age: 89
End: 2024-04-01
Payer: MEDICARE

## 2024-04-01 VITALS
SYSTOLIC BLOOD PRESSURE: 100 MMHG | OXYGEN SATURATION: 95 % | TEMPERATURE: 97.7 F | RESPIRATION RATE: 16 BRPM | HEART RATE: 60 BPM | DIASTOLIC BLOOD PRESSURE: 52 MMHG

## 2024-04-01 PROCEDURE — G0299 HHS/HOSPICE OF RN EA 15 MIN: HCPCS

## 2024-04-02 ASSESSMENT — ENCOUNTER SYMPTOMS: BOWEL INCONTINENCE: 1

## 2024-04-02 NOTE — HOSPICE
Patient/Caregiver/Family/Facility findings/issues during SN visit:  awake and alert today, drank 6oz thickened lemon water, Kemar updated, no needs or concerns    Medication refills ordered this visit: none    Medications reconciled and all medications are available in the home this visit.  The following education was provided regarding medications, medication interactions, and look alike medications.  Response to teaching: facility staff  verbalized understanding. Medications are effective at this time.      Supplies by type and quantity ordered this visit include: none needed    Consulted medical director/attending physician regarding: N/A    Instructed patient/family/caregiver on 24-hour hospice availability and phone number.    Plan for next visit:  monitor GI status

## 2024-04-04 ENCOUNTER — HOME CARE VISIT (OUTPATIENT)
Age: 89
End: 2024-04-04
Payer: MEDICARE

## 2024-04-04 PROCEDURE — G0299 HHS/HOSPICE OF RN EA 15 MIN: HCPCS

## 2024-04-04 PROCEDURE — G0156 HHCP-SVS OF AIDE,EA 15 MIN: HCPCS

## 2024-04-08 ENCOUNTER — HOME CARE VISIT (OUTPATIENT)
Age: 89
End: 2024-04-08
Payer: MEDICARE

## 2024-04-09 ENCOUNTER — HOME CARE VISIT (OUTPATIENT)
Age: 89
End: 2024-04-09
Payer: MEDICARE

## 2024-04-09 VITALS — TEMPERATURE: 98.8 F

## 2024-04-09 PROCEDURE — G0299 HHS/HOSPICE OF RN EA 15 MIN: HCPCS

## 2024-04-09 NOTE — HOSPICE
Patient was in bed and as soon as I walked in her room she said hello. Her conversation is not coherent.  will continue to follow.

## 2024-04-10 ASSESSMENT — ENCOUNTER SYMPTOMS
BOWEL INCONTINENCE: 1
SKIN LESIONS: 1

## 2024-04-10 NOTE — HOSPICE
Patient/Caregiver/Family/Facility findings/issues during SN visit:  no needs or concerns, pt cooperative     Medication refills ordered this visit: none needed    Medications reconciled and all medications are available in the home this visit.  The following education was provided regarding medications, medication interactions, and look alike medications.  Response to teaching: facility verbalized understanding. Medications are effective at this time.      Supplies by type and quantity ordered this visit include: none    Consulted medical director/attending physician regarding: N/A    Instructed patient/family/caregiver on 24-hour hospice availability and phone number.    Plan for next visit:  monitor PO intake

## 2024-04-10 NOTE — HOSPICE
Patient/Caregiver/Family/Facility findings/issues during SN visit:  no needs or concerns, Kemar updated    Medication refills ordered this visit: none needed    Medications reconciled and all medications are available in the home this visit.  The following education was provided regarding medications, medication interactions, and look alike medications.  Response to teaching: facility verbalized understanding. Medications are  effective at this time.      Supplies by type and quantity ordered this visit include: none    Consulted medical director/attending physician regarding: N/A    Instructed patient/family/caregiver on 24-hour hospice availability and phone number.    Plan for next visit:  monitor skin

## 2024-04-11 ENCOUNTER — HOME CARE VISIT (OUTPATIENT)
Age: 89
End: 2024-04-11
Payer: MEDICARE

## 2024-04-12 ENCOUNTER — HOME CARE VISIT (OUTPATIENT)
Age: 89
End: 2024-04-12
Payer: MEDICARE

## 2024-04-12 VITALS
DIASTOLIC BLOOD PRESSURE: 50 MMHG | TEMPERATURE: 98.9 F | SYSTOLIC BLOOD PRESSURE: 92 MMHG | RESPIRATION RATE: 16 BRPM | HEART RATE: 86 BPM

## 2024-04-12 PROCEDURE — G0299 HHS/HOSPICE OF RN EA 15 MIN: HCPCS

## 2024-04-12 NOTE — HOSPICE
Patient/Caregiver/Family/Facility findings/issues during SN visit:  n/a    Medication refills ordered this visit: none needed    Medications reconciled and all medications are available in the home this visit.  The following education was provided regarding medications, medication interactions, and look alike medications.  Response to teaching: caregiver verbalized understanding. Medications are effective at this time.      Supplies by type and quantity ordered this visit include: Order Number : [id number] wipes, lotion, body wash, gloves, basin    Consulted medical director/attending physician regarding: n/a    Instructed patient/family/caregiver on 24-hour hospice availability and phone number.    Plan for next visit:  follow up      Pt is eating a couple bites per meal, but is drinking all of her ensure.

## 2024-04-15 ENCOUNTER — HOME CARE VISIT (OUTPATIENT)
Age: 89
End: 2024-04-15
Payer: MEDICARE

## 2024-04-15 VITALS — HEART RATE: 88 BPM | TEMPERATURE: 99 F | RESPIRATION RATE: 16 BRPM

## 2024-04-15 PROCEDURE — G0299 HHS/HOSPICE OF RN EA 15 MIN: HCPCS

## 2024-04-16 ENCOUNTER — HOME CARE VISIT (OUTPATIENT)
Age: 89
End: 2024-04-16
Payer: MEDICARE

## 2024-04-16 ASSESSMENT — ENCOUNTER SYMPTOMS
BOWEL INCONTINENCE: 1
SKIN LESIONS: 1

## 2024-04-16 NOTE — HOSPICE
Patient/Caregiver/Family/Facility findings/issues during SN visit:  no needs or concerns    Medication refills ordered this visit: none    Medications reconciled and all medications are available in the home this visit.  The following education was provided regarding medications, medication interactions, and look alike medications.  Response to teaching: facility staff. Medications are effective at this time.      Supplies by type and quantity ordered this visit include: none needed    Consulted medical director/attending physician regarding: N/A    Instructed patient/family/caregiver on 24-hour hospice availability and phone number.    Plan for next visit:  monitor skin

## 2024-04-18 ENCOUNTER — HOME CARE VISIT (OUTPATIENT)
Age: 89
End: 2024-04-18
Payer: MEDICARE

## 2024-04-18 PROCEDURE — G0156 HHCP-SVS OF AIDE,EA 15 MIN: HCPCS

## 2024-04-19 ENCOUNTER — HOME CARE VISIT (OUTPATIENT)
Age: 89
End: 2024-04-19
Payer: MEDICARE

## 2024-04-19 PROCEDURE — G0299 HHS/HOSPICE OF RN EA 15 MIN: HCPCS

## 2024-04-22 ENCOUNTER — HOME CARE VISIT (OUTPATIENT)
Age: 89
End: 2024-04-22
Payer: MEDICARE

## 2024-04-22 VITALS
DIASTOLIC BLOOD PRESSURE: 66 MMHG | RESPIRATION RATE: 16 BRPM | SYSTOLIC BLOOD PRESSURE: 98 MMHG | TEMPERATURE: 97.9 F | OXYGEN SATURATION: 96 % | HEART RATE: 63 BPM

## 2024-04-22 PROCEDURE — G0300 HHS/HOSPICE OF LPN EA 15 MIN: HCPCS

## 2024-04-22 ASSESSMENT — ENCOUNTER SYMPTOMS
BOWEL INCONTINENCE: 1
SKIN LESIONS: 1

## 2024-04-22 NOTE — HOSPICE
Patient/Caregiver/Family/Facility findings/issues during SN visit:  No new concerns per LPN Geetha.  Pt presents lying in bed with eyes closed.  No signs of acute distress noted.  Pt appetite has decreased according to staff.  BM Friday.  No new needs at this time.    Medication refills ordered this visit: Not needed per staff nurse    Medications reconciled and all medications are available in the home this visit.  The following education was provided regarding medications, medication interactions, and look alike medications: Medication side effects, dosages, purposes, frequencies.  Response to teaching: Verbalized understanding. Medications are effective at this time.      Supplies by type and quantity ordered this visit include: Not needed    Consulted medical director/attending physician regarding: Not needed this visit    Instructed patient/family/caregiver on 24-hour hospice availability and phone number.    Plan for next visit:  Continue end of life education and support caregiver.

## 2024-04-25 ENCOUNTER — HOME CARE VISIT (OUTPATIENT)
Age: 89
End: 2024-04-25
Payer: MEDICARE

## 2024-04-25 PROCEDURE — G0156 HHCP-SVS OF AIDE,EA 15 MIN: HCPCS

## 2024-04-25 PROCEDURE — G0299 HHS/HOSPICE OF RN EA 15 MIN: HCPCS

## 2024-04-26 ASSESSMENT — ENCOUNTER SYMPTOMS
BOWEL INCONTINENCE: 1
SKIN LESIONS: 1

## 2024-04-26 NOTE — HOSPICE
Patient/Caregiver/Family/Facility findings/issues during SN visit:   None identified    Medication refills ordered this visit:   None needed    Medications reconciled and all medications are available in the home this visit.  Education was provided regarding medications, medication interactions, and look alike medications.   Response to teaching.   Medications are effective at this time.      Supplies by type and quantity ordered this visit include:  briefs, wifes,chux and barrier cream ordered    Consulted medical director/attending physician regarding: Not at this time    Instructed patient/family/caregiver on 24-hour hospice availability and phone number.    Plan for next visit:  Continue to assess for decline and provide support

## 2024-04-29 ENCOUNTER — HOME CARE VISIT (OUTPATIENT)
Age: 89
End: 2024-04-29
Payer: MEDICARE

## 2024-04-29 PROCEDURE — G0156 HHCP-SVS OF AIDE,EA 15 MIN: HCPCS

## 2024-04-30 ENCOUNTER — HOME CARE VISIT (OUTPATIENT)
Age: 89
End: 2024-04-30
Payer: MEDICARE

## 2024-04-30 VITALS
SYSTOLIC BLOOD PRESSURE: 78 MMHG | DIASTOLIC BLOOD PRESSURE: 44 MMHG | RESPIRATION RATE: 16 BRPM | TEMPERATURE: 100.2 F | HEART RATE: 74 BPM | OXYGEN SATURATION: 93 %

## 2024-04-30 PROCEDURE — G0299 HHS/HOSPICE OF RN EA 15 MIN: HCPCS

## 2024-04-30 ASSESSMENT — ENCOUNTER SYMPTOMS: BOWEL INCONTINENCE: 1

## 2024-04-30 NOTE — HOSPICE
Patient/Caregiver/Family/Facility findings/issues during SN visit:  no needs or concerns, son Kemar updated    Medication refills ordered this visit: none    Medications reconciled and all medications are available in the home this visit.  The following education was provided regarding medications, medication interactions, and look alike medications.  Response to teaching: facility verbalized understanding. Medications are effective at this time.      Supplies by type and quantity ordered this visit include: none needed    Consulted medical director/attending physician regarding: N/A    Instructed patient/family/caregiver on 24-hour hospice availability and phone number.    Plan for next visit:  monitor PO intake

## 2024-05-01 ENCOUNTER — HOME CARE VISIT (OUTPATIENT)
Age: 89
End: 2024-05-01
Payer: MEDICARE

## 2024-05-01 NOTE — HOSPICE
Patient was sleeping upon arrival. She woke up to speak to me and went back to sleep.  will continue to follow.

## 2024-05-02 ENCOUNTER — HOME CARE VISIT (OUTPATIENT)
Age: 89
End: 2024-05-02
Payer: MEDICARE

## 2024-05-02 PROCEDURE — G0299 HHS/HOSPICE OF RN EA 15 MIN: HCPCS

## 2024-05-02 PROCEDURE — G0156 HHCP-SVS OF AIDE,EA 15 MIN: HCPCS

## 2024-05-03 VITALS
TEMPERATURE: 98 F | OXYGEN SATURATION: 97 % | RESPIRATION RATE: 18 BRPM | DIASTOLIC BLOOD PRESSURE: 61 MMHG | HEART RATE: 86 BPM | SYSTOLIC BLOOD PRESSURE: 96 MMHG

## 2024-05-03 ASSESSMENT — ENCOUNTER SYMPTOMS: BOWEL INCONTINENCE: 1

## 2024-05-03 NOTE — HOSPICE
Patient/Caregiver/Family/Facility findings/issues during SN visit:  None identified    Medication refills ordered this visit: None needed    Medications reconciled and all medications are available in the home this visit.  Education was provided regarding medications, medication interactions, and look alike medications.   Response to teaching.  Facility staff verbalized understanding.   Medications are effective at this time.      Supplies by type and quantity ordered this visit include: none needed    Consulted medical director/attending physician regarding: Not at this time    Instructed patient/family/caregiver on 24-hour hospice availability and phone number.    Plan for next visit:  Continued EOL education and support.  Call placed to Selma Gimenez 5/3 and it went to .  No VM left as there was only a  generic message not identifying person.

## 2024-05-06 ENCOUNTER — HOME CARE VISIT (OUTPATIENT)
Age: 89
End: 2024-05-06
Payer: MEDICARE

## 2024-05-06 PROCEDURE — G0156 HHCP-SVS OF AIDE,EA 15 MIN: HCPCS

## 2024-05-07 ENCOUNTER — HOME CARE VISIT (OUTPATIENT)
Age: 89
End: 2024-05-07
Payer: MEDICARE

## 2024-05-07 VITALS
OXYGEN SATURATION: 96 % | RESPIRATION RATE: 16 BRPM | TEMPERATURE: 98.3 F | SYSTOLIC BLOOD PRESSURE: 88 MMHG | DIASTOLIC BLOOD PRESSURE: 62 MMHG | HEART RATE: 70 BPM

## 2024-05-07 PROCEDURE — G0300 HHS/HOSPICE OF LPN EA 15 MIN: HCPCS

## 2024-05-07 ASSESSMENT — ENCOUNTER SYMPTOMS: BOWEL INCONTINENCE: 1

## 2024-05-08 ASSESSMENT — ENCOUNTER SYMPTOMS
BOWEL INCONTINENCE: 1
SKIN LESIONS: 1

## 2024-05-08 NOTE — HOSPICE
Patient/Facility findings/issues during SN visit: no concerns reported    Medication refills ordered this visit: none requested    Medications reconciled and all medications are/are not available in the home this visit. Medications are effective/not effective at this time.      Supplies by type and quantity ordered this visit include: none requested    Instructed patient/family/caregiver on 24-hour hospice availability and phone number.    Plan for next visit: EOL education and caregiver support

## 2024-05-08 NOTE — HOSPICE
Patient/Caregiver/Family/Facility findings/issues during SN visit:  Patient presents sitting up in her bed drinking ensure.  Pleasantly confused.  No signs of distress or discomfort.  Pt denies pain.    Vitals within normal limits.  Pt left MAC decreased by 1cm in the past month.  Appetite has decreased however the pt will drink 1-2 ensures a day.Corinne Garrison    Medication refills ordered this visit: Not needed this visit    Medications reconciled and all medications are available in the home this visit.  The following education was provided regarding medications, medication interactions, and look alike medications: Medication side effects, dosages, purposes, frequencies.  Response to teaching: Verbalized understanding. Medications are effective at this time.      Supplies by type and quantity ordered this visit include: Not needed- two full packs of briefs in pts closet.    Consulted medical director/attending physician regarding: Not needed    Instructed patient/family/caregiver on 24-hour hospice availability and phone number.    Plan for next visit:  Continue end of life education and support caregiver.

## 2024-05-10 ENCOUNTER — HOME CARE VISIT (OUTPATIENT)
Age: 89
End: 2024-05-10
Payer: MEDICARE

## 2024-05-10 PROCEDURE — G0156 HHCP-SVS OF AIDE,EA 15 MIN: HCPCS

## 2024-05-10 PROCEDURE — G0299 HHS/HOSPICE OF RN EA 15 MIN: HCPCS

## 2024-05-13 ENCOUNTER — HOME CARE VISIT (OUTPATIENT)
Age: 89
End: 2024-05-13
Payer: MEDICARE

## 2024-05-13 PROCEDURE — G0156 HHCP-SVS OF AIDE,EA 15 MIN: HCPCS

## 2024-05-14 ENCOUNTER — HOME CARE VISIT (OUTPATIENT)
Age: 89
End: 2024-05-14
Payer: MEDICARE

## 2024-05-14 VITALS
RESPIRATION RATE: 14 BRPM | TEMPERATURE: 98.2 F | OXYGEN SATURATION: 97 % | DIASTOLIC BLOOD PRESSURE: 62 MMHG | SYSTOLIC BLOOD PRESSURE: 108 MMHG | HEART RATE: 88 BPM

## 2024-05-14 PROCEDURE — G0299 HHS/HOSPICE OF RN EA 15 MIN: HCPCS

## 2024-05-14 ASSESSMENT — ENCOUNTER SYMPTOMS: BOWEL INCONTINENCE: 1

## 2024-05-14 NOTE — HOSPICE
Patient/Caregiver/Family/Facility findings/issues during SN visit:  Patient is supine in bed at time of visit.  She is alert and is picking at her blanket and doll.  She is pleasant but confused.  Repeats phrases.  No signs of pain or discomfort.  New Left heel DTI noted.  Covered with betadine and gauze.  Patient denies pain in foot.  No falls or injuries reported.  Sariah, patient's nurse, states she appears comfortable most of the time. Appetite is poor.    Medication refills ordered this visit: none needed    Medications reconciled and all medications are available in the home this visit.  The following education was provided regarding medications, medication interactions, and look alike medications: disucssed proper use of comfort meds  Response to teaching: verbalized understanding. Medications are effectuve at this time.      Supplies by type and quantity ordered this visit include: medium diapers, chux pads, barrier cream and wipes  Conf #[id number]    Consulted medical director/attending physician regarding: na    Instructed patient/family/caregiver on 24-hour hospice availability and phone number.    Plan for next visit:  assess comfort and needs of patient and staff.

## 2024-05-16 ENCOUNTER — HOME CARE VISIT (OUTPATIENT)
Age: 89
End: 2024-05-16
Payer: MEDICARE

## 2024-05-16 PROCEDURE — G0156 HHCP-SVS OF AIDE,EA 15 MIN: HCPCS

## 2024-05-17 ENCOUNTER — HOME CARE VISIT (OUTPATIENT)
Age: 89
End: 2024-05-17
Payer: MEDICARE

## 2024-05-17 VITALS
SYSTOLIC BLOOD PRESSURE: 91 MMHG | RESPIRATION RATE: 17 BRPM | TEMPERATURE: 97.6 F | DIASTOLIC BLOOD PRESSURE: 50 MMHG | HEART RATE: 71 BPM | OXYGEN SATURATION: 95 %

## 2024-05-17 PROCEDURE — G0299 HHS/HOSPICE OF RN EA 15 MIN: HCPCS

## 2024-05-17 ASSESSMENT — ENCOUNTER SYMPTOMS: BOWEL INCONTINENCE: 1

## 2024-05-20 ENCOUNTER — HOME CARE VISIT (OUTPATIENT)
Age: 89
End: 2024-05-20
Payer: MEDICARE

## 2024-05-20 PROCEDURE — G0156 HHCP-SVS OF AIDE,EA 15 MIN: HCPCS

## 2024-05-21 ENCOUNTER — HOME CARE VISIT (OUTPATIENT)
Age: 89
End: 2024-05-21
Payer: MEDICARE

## 2024-05-21 PROCEDURE — G0299 HHS/HOSPICE OF RN EA 15 MIN: HCPCS

## 2024-05-21 ASSESSMENT — ENCOUNTER SYMPTOMS
SKIN LESIONS: 1
BOWEL INCONTINENCE: 1

## 2024-05-21 NOTE — HOSPICE
Patient/Caregiver/Family/Facility findings/issues during SN visit:  no needs or concerns, sofi Reinoso updated    Medication refills ordered this visit: none needed    Medications reconciled and all medications are available in the home this visit.  The following education was provided regarding medications, medication interactions, and look alike medications.  Response to teaching: facility verbalized understanding. Medications are  effective at this time.      Supplies by type and quantity ordered this visit include: none    Consulted medical director/attending physician regarding: N/A    Instructed patient/family/caregiver on 24-hour hospice availability and phone number.    Plan for next visit:  monitor skin

## 2024-05-22 NOTE — HOSPICE
Patient/Caregiver/Family/Facility findings/issues during SN visit:  facility called reporting escoriation to face from licking fingers and wiping face, upon arrival pt in bed licking her fingers and then wipped her face, redness to both cheeks, dry skin on both sides, applied protective ointment, nurse will put order in to apply BID, son updated stating this happens from time to time    Medication refills ordered this visit: none    Medications reconciled and all medications are available in the home this visit.  The following education was provided regarding medications, medication interactions, and look alike medications.  Response to teaching: facility verbalized understanding. Medications are effective at this time.      Supplies by type and quantity ordered this visit include: none    Consulted medical director/attending physician regarding: N/A    Instructed patient/family/caregiver on 24-hour hospice availability and phone number.    Plan for next visit:  monitor skin

## 2024-05-23 ENCOUNTER — HOME CARE VISIT (OUTPATIENT)
Age: 89
End: 2024-05-23
Payer: MEDICARE

## 2024-05-23 PROCEDURE — G0156 HHCP-SVS OF AIDE,EA 15 MIN: HCPCS

## 2024-05-23 ASSESSMENT — ENCOUNTER SYMPTOMS
BOWEL INCONTINENCE: 1
SKIN LESIONS: 1

## 2024-05-24 ENCOUNTER — HOME CARE VISIT (OUTPATIENT)
Age: 89
End: 2024-05-24
Payer: MEDICARE

## 2024-05-24 VITALS
RESPIRATION RATE: 17 BRPM | TEMPERATURE: 98 F | DIASTOLIC BLOOD PRESSURE: 65 MMHG | HEART RATE: 94 BPM | SYSTOLIC BLOOD PRESSURE: 88 MMHG

## 2024-05-24 PROCEDURE — G0299 HHS/HOSPICE OF RN EA 15 MIN: HCPCS

## 2024-05-24 ASSESSMENT — ENCOUNTER SYMPTOMS
PAIN LOCATION - PAIN QUALITY: SHARP
BOWEL INCONTINENCE: 1

## 2024-05-27 ENCOUNTER — HOME CARE VISIT (OUTPATIENT)
Age: 89
End: 2024-05-27
Payer: MEDICARE

## 2024-05-27 PROCEDURE — G0156 HHCP-SVS OF AIDE,EA 15 MIN: HCPCS

## 2024-05-28 ENCOUNTER — HOME CARE VISIT (OUTPATIENT)
Age: 89
End: 2024-05-28
Payer: MEDICARE

## 2024-05-28 VITALS
DIASTOLIC BLOOD PRESSURE: 46 MMHG | TEMPERATURE: 97.6 F | SYSTOLIC BLOOD PRESSURE: 94 MMHG | RESPIRATION RATE: 18 BRPM | OXYGEN SATURATION: 99 % | HEART RATE: 80 BPM

## 2024-05-28 PROCEDURE — G0299 HHS/HOSPICE OF RN EA 15 MIN: HCPCS

## 2024-05-28 ASSESSMENT — ENCOUNTER SYMPTOMS: BOWEL INCONTINENCE: 1

## 2024-05-28 NOTE — HOSPICE
Patient/Caregiver/Family/Facility findings/issues during SN visit:  None identified    Medication refills ordered this visit:   none needed    Medications reconciled and all medications are available in the home this visit.  Education was provided regarding medications, medication interactions, and look alike medications.   Response to teaching.  Facility staff verbalized understanding.   Medications are effective at this time.      Supplies by type and quantity ordered this visit include:   None needed    Consulted medical director/attending physician regarding:   Not at this time    Instructed patient/family/caregiver on 24-hour hospice availability and phone number.    Plan for next visit:  Continued EOL education and support

## 2024-05-30 ENCOUNTER — HOME CARE VISIT (OUTPATIENT)
Age: 89
End: 2024-05-30
Payer: MEDICARE

## 2024-05-30 PROCEDURE — G0156 HHCP-SVS OF AIDE,EA 15 MIN: HCPCS

## 2024-06-01 ENCOUNTER — HOME CARE VISIT (OUTPATIENT)
Age: 89
End: 2024-06-01
Payer: MEDICARE

## 2024-06-01 VITALS
OXYGEN SATURATION: 99 % | SYSTOLIC BLOOD PRESSURE: 69 MMHG | HEART RATE: 51 BPM | TEMPERATURE: 97.7 F | DIASTOLIC BLOOD PRESSURE: 50 MMHG | RESPIRATION RATE: 15 BRPM

## 2024-06-01 PROCEDURE — G0299 HHS/HOSPICE OF RN EA 15 MIN: HCPCS

## 2024-06-01 ASSESSMENT — ENCOUNTER SYMPTOMS: BOWEL INCONTINENCE: 1

## 2024-06-01 NOTE — HOSPICE
Upon arrival, patient resting in bed leaning to left side facing the wall on room air.  Patient behavior indicators negative for pain and discomfort.  Patient will open her eyes to verbal stimuli but does not speak.        Patient appears to have a eraser size bruise on her bottom lip right side.  Patient presents frail and cachectic, lips drawn into mouth and skin dry and flaky.  Patient has a pencil eraser size pink blanchable area on her left second digit and a scab on her mid center breast bone.  Patient bony prominences visible.  When writer attempted to take vitals, patient snatched the covers and stated \"They should be done by now.\"      Spoke to staff nurse who reports that patient is eating and drinking well.  When asked, she informs this nurse that patient LBM was yesterday.  Writer informed her of what this nurse found on patient skin.            Diapers (Small), wipes, chux. creams.  Order Number : [id number]

## 2024-06-04 ENCOUNTER — HOME CARE VISIT (OUTPATIENT)
Age: 89
End: 2024-06-04
Payer: MEDICARE

## 2024-06-04 PROCEDURE — G0156 HHCP-SVS OF AIDE,EA 15 MIN: HCPCS

## 2024-06-05 ENCOUNTER — HOME CARE VISIT (OUTPATIENT)
Age: 89
End: 2024-06-05
Payer: MEDICARE

## 2024-06-05 VITALS
HEART RATE: 91 BPM | SYSTOLIC BLOOD PRESSURE: 94 MMHG | DIASTOLIC BLOOD PRESSURE: 56 MMHG | TEMPERATURE: 97.2 F | RESPIRATION RATE: 16 BRPM

## 2024-06-05 PROCEDURE — G0299 HHS/HOSPICE OF RN EA 15 MIN: HCPCS

## 2024-06-05 ASSESSMENT — ENCOUNTER SYMPTOMS: PAIN LOCATION - PAIN QUALITY: SHARP

## 2024-06-05 NOTE — HOSPICE
Patient/Caregiver/Family/Facility findings/issues during SN visit:  n/a    Medication refills ordered this visit: n/a    Medications reconciled and all medications are available in the home this visit.  The following education was provided regarding medications, medication interactions, and look alike medications.  Response to teaching: caregivers verbalized understanding. Medications are effective at this time.      Supplies by type and quantity ordered this visit include: none needed    Consulted medical director/attending physician regarding: n/a    Instructed patient/family/caregiver on 24-hour hospice availability and phone number.    Plan for next visit:  follow up

## 2024-06-06 ENCOUNTER — HOME CARE VISIT (OUTPATIENT)
Age: 89
End: 2024-06-06
Payer: MEDICARE

## 2024-06-06 PROCEDURE — G0156 HHCP-SVS OF AIDE,EA 15 MIN: HCPCS

## 2024-06-10 ENCOUNTER — HOME CARE VISIT (OUTPATIENT)
Age: 89
End: 2024-06-10
Payer: MEDICARE

## 2024-06-10 PROCEDURE — G0156 HHCP-SVS OF AIDE,EA 15 MIN: HCPCS

## 2024-06-11 ENCOUNTER — HOME CARE VISIT (OUTPATIENT)
Age: 89
End: 2024-06-11
Payer: MEDICARE

## 2024-06-11 VITALS
SYSTOLIC BLOOD PRESSURE: 101 MMHG | OXYGEN SATURATION: 97 % | RESPIRATION RATE: 16 BRPM | HEART RATE: 88 BPM | TEMPERATURE: 98 F | DIASTOLIC BLOOD PRESSURE: 60 MMHG

## 2024-06-11 PROCEDURE — G0299 HHS/HOSPICE OF RN EA 15 MIN: HCPCS

## 2024-06-13 ENCOUNTER — HOME CARE VISIT (OUTPATIENT)
Age: 89
End: 2024-06-13
Payer: MEDICARE

## 2024-06-14 ENCOUNTER — HOME CARE VISIT (OUTPATIENT)
Age: 89
End: 2024-06-14
Payer: MEDICARE

## 2024-06-14 PROCEDURE — G0299 HHS/HOSPICE OF RN EA 15 MIN: HCPCS

## 2024-06-14 PROCEDURE — G0156 HHCP-SVS OF AIDE,EA 15 MIN: HCPCS

## 2024-06-17 ENCOUNTER — HOME CARE VISIT (OUTPATIENT)
Age: 89
End: 2024-06-17
Payer: MEDICARE

## 2024-06-17 PROCEDURE — G0156 HHCP-SVS OF AIDE,EA 15 MIN: HCPCS

## 2024-06-18 ENCOUNTER — HOME CARE VISIT (OUTPATIENT)
Age: 89
End: 2024-06-18
Payer: MEDICARE

## 2024-06-18 PROCEDURE — G0299 HHS/HOSPICE OF RN EA 15 MIN: HCPCS

## 2024-06-18 NOTE — HOSPICE
Patient was in bed with a drink in her hand. She didn't say anything to me today which is a decline from visits in the past because she would talk to me.  will continue to follow.

## 2024-06-21 ENCOUNTER — HOME CARE VISIT (OUTPATIENT)
Age: 89
End: 2024-06-21
Payer: MEDICARE

## 2024-06-21 PROCEDURE — G0300 HHS/HOSPICE OF LPN EA 15 MIN: HCPCS

## 2024-06-21 PROCEDURE — G0156 HHCP-SVS OF AIDE,EA 15 MIN: HCPCS

## 2024-06-22 ASSESSMENT — ENCOUNTER SYMPTOMS: BOWEL INCONTINENCE: 1

## 2024-06-22 NOTE — HOSPICE
Patient/Caregiver/Family/Facility findings/issues during SN visit:  pt in bed with eyes closed, no needs or concerns from facility    Medication refills ordered this visit: none    Medications reconciled and all medications are available in the home this visit.  The following education was provided regarding medications, medication interactions, and look alike medications.  Response to teaching: facility. Medications are effective at this time.      Supplies by type and quantity ordered this visit include: none    Consulted medical director/attending physician regarding: N/A    Instructed patient/family/caregiver on 24-hour hospice availability and phone number.    Plan for next visit:  monitor skin

## 2024-06-23 VITALS
SYSTOLIC BLOOD PRESSURE: 98 MMHG | OXYGEN SATURATION: 94 % | TEMPERATURE: 97.6 F | DIASTOLIC BLOOD PRESSURE: 56 MMHG | RESPIRATION RATE: 14 BRPM | HEART RATE: 90 BPM

## 2024-06-25 ENCOUNTER — HOME CARE VISIT (OUTPATIENT)
Age: 89
End: 2024-06-25
Payer: MEDICARE

## 2024-06-25 VITALS
RESPIRATION RATE: 16 BRPM | SYSTOLIC BLOOD PRESSURE: 100 MMHG | HEART RATE: 88 BPM | DIASTOLIC BLOOD PRESSURE: 50 MMHG | OXYGEN SATURATION: 95 % | TEMPERATURE: 98 F

## 2024-06-25 PROCEDURE — G0156 HHCP-SVS OF AIDE,EA 15 MIN: HCPCS

## 2024-06-25 PROCEDURE — G0299 HHS/HOSPICE OF RN EA 15 MIN: HCPCS

## 2024-06-26 ASSESSMENT — ENCOUNTER SYMPTOMS
BOWEL INCONTINENCE: 1
SKIN LESIONS: 1

## 2024-06-26 NOTE — HOSPICE
Patient/Caregiver/Family/Facility findings/issues during SN visit:  per floor nurse pt ate breakfast and lunch today, drinking 3-6 Ensure daily    Medication refills ordered this visit: none    Medications reconciled and all medications are available in the home this visit.  The following education was provided regarding medications, medication interactions, and look alike medications.  Response to teaching: facility verbalized understanding. Medications are effective at this time.      Supplies by type and quantity ordered this visit include: none    Consulted medical director/attending physician regarding: N/A    Instructed patient/family/caregiver on 24-hour hospice availability and phone number.    Plan for next visit:  monitor skin

## 2024-06-27 ENCOUNTER — HOME CARE VISIT (OUTPATIENT)
Age: 89
End: 2024-06-27
Payer: MEDICARE

## 2024-06-27 PROCEDURE — G0156 HHCP-SVS OF AIDE,EA 15 MIN: HCPCS

## 2024-06-28 ENCOUNTER — HOME CARE VISIT (OUTPATIENT)
Age: 89
End: 2024-06-28

## 2024-06-28 PROCEDURE — G0299 HHS/HOSPICE OF RN EA 15 MIN: HCPCS

## 2024-07-01 ENCOUNTER — HOME CARE VISIT (OUTPATIENT)
Age: 89
End: 2024-07-01
Payer: MEDICARE

## 2024-07-01 PROCEDURE — G0156 HHCP-SVS OF AIDE,EA 15 MIN: HCPCS

## 2024-07-01 NOTE — HOSPICE
Patient was in bed and she did respond to my voice. The staff said that she was doing ok and they had not seen much change.  will continue to follow.

## 2024-07-02 ENCOUNTER — HOME CARE VISIT (OUTPATIENT)
Age: 89
End: 2024-07-02
Payer: MEDICARE

## 2024-07-02 PROCEDURE — G0299 HHS/HOSPICE OF RN EA 15 MIN: HCPCS

## 2024-07-02 ASSESSMENT — ENCOUNTER SYMPTOMS
BOWEL INCONTINENCE: 1
SKIN LESIONS: 1
BOWEL INCONTINENCE: 1

## 2024-07-03 NOTE — HOSPICE
Patient/Caregiver/Family/Facility findings/issues during SN visit:  no needs or concerns    Medication refills ordered this visit: none    Medications reconciled and all medications are/are not available in the home this visit.  The following education was provided regarding medications, medication interactions, and look alike medications.  Response to teaching: facility verbalized understanding. Medications are effective at this time.      Supplies by type and quantity ordered this visit include: none    Consulted medical director/attending physician regarding: N/A    Instructed patient/family/caregiver on 24-hour hospice availability and phone number.    Plan for next visit:  monitor skin

## 2024-07-03 NOTE — HOSPICE
Patient/Caregiver/Family/Facility findings/issues during SN visit:  no needs or concerns, drank 1 vanilla ensure during visit, very pleasant today    Medication refills ordered this visit: none    Medications reconciled and all medications are available in the home this visit.  The following education was provided regarding medications, medication interactions, and look alike medications.  Response to teaching: facility staff. Medications are effective at this time.      Supplies by type and quantity ordered this visit include: none    Consulted medical director/attending physician regarding: N/A    Instructed patient/family/caregiver on 24-hour hospice availability and phone number.    Plan for next visit:  monitor skin

## 2024-07-05 ENCOUNTER — HOME CARE VISIT (OUTPATIENT)
Age: 89
End: 2024-07-05
Payer: MEDICARE

## 2024-07-05 PROCEDURE — G0156 HHCP-SVS OF AIDE,EA 15 MIN: HCPCS

## 2024-07-08 ENCOUNTER — HOME CARE VISIT (OUTPATIENT)
Age: 89
End: 2024-07-08
Payer: MEDICARE

## 2024-07-08 PROCEDURE — G0156 HHCP-SVS OF AIDE,EA 15 MIN: HCPCS

## 2024-07-09 ENCOUNTER — HOME CARE VISIT (OUTPATIENT)
Age: 89
End: 2024-07-09
Payer: MEDICARE

## 2024-07-09 PROCEDURE — G0299 HHS/HOSPICE OF RN EA 15 MIN: HCPCS

## 2024-07-09 ASSESSMENT — ENCOUNTER SYMPTOMS
SKIN LESIONS: 1
BOWEL INCONTINENCE: 1

## 2024-07-09 NOTE — HOSPICE
Mrs. Ashley Gimenez, 89 year old female, admitted to Hospice services for a terminal diagnosis of advanced Alzheimer's dementia. Patient has elected hospice services and is no longer seeking aggressive treatment. Co-morbidities related to the terminal diagnosis are debility, appetite loss, DVT, recent hematemesis, anemia, hypertension, CKD. Patient also has a past medical history of osteoporosis, dyslipidemia.     3 MO ago pt speaks non-sensical speach, increased agitation with ADL's, total care, R heel unstagable wound 2x2x0.1cm, thickened liquids, <25% BID pureed diet, total feed   NOW pt speaks 1-2 words at a time, continues to have agitation with ADL's, R heel non-healing wound now 5x5x0.3cm, pt drinks 3 Ensure daily, facial and temporal muscle wasting    Advance Directives: DNR   Allergies: NKDA   LMAC:3 MO ago 19cm ; NOW 16.9cm  Weight: 85 lbs on admission  PPS: 30%   FAST: 7D   Sleeps 16-18 HRS  NOW:18-20 HRS  Tobacco usage: N/A   Functional status: total care   Infection: N/A   Bowel Regimen: dulcolax   Lines, Drains, or Airways present: N/A   Wounds present: non-healing R heel unstagable wound Ketoconazole Counseling:   Patient counseled regarding improving absorption with orange juice.  Adverse effects include but are not limited to breast enlargement, headache, diarrhea, nausea, upset stomach, liver function test abnormalities, taste disturbance, and stomach pain.  There is a rare possibility of liver failure that can occur when taking ketoconazole. The patient understands that monitoring of LFTs may be required, especially at baseline. The patient verbalized understanding of the proper use and possible adverse effects of ketoconazole.  All of the patient's questions and concerns were addressed.

## 2024-07-11 ENCOUNTER — HOME CARE VISIT (OUTPATIENT)
Age: 89
End: 2024-07-11
Payer: MEDICARE

## 2024-07-11 PROCEDURE — G0156 HHCP-SVS OF AIDE,EA 15 MIN: HCPCS

## 2024-07-12 ENCOUNTER — HOME CARE VISIT (OUTPATIENT)
Age: 89
End: 2024-07-12
Payer: MEDICARE

## 2024-07-12 PROCEDURE — G0299 HHS/HOSPICE OF RN EA 15 MIN: HCPCS

## 2024-07-15 ENCOUNTER — HOME CARE VISIT (OUTPATIENT)
Age: 89
End: 2024-07-15
Payer: MEDICARE

## 2024-07-15 PROCEDURE — G0156 HHCP-SVS OF AIDE,EA 15 MIN: HCPCS

## 2024-07-16 ENCOUNTER — HOME CARE VISIT (OUTPATIENT)
Age: 89
End: 2024-07-16
Payer: MEDICARE

## 2024-07-16 PROCEDURE — G0299 HHS/HOSPICE OF RN EA 15 MIN: HCPCS

## 2024-07-17 ASSESSMENT — ENCOUNTER SYMPTOMS: BOWEL INCONTINENCE: 1

## 2024-07-17 NOTE — HOSPICE
Patient/Caregiver/Family/Facility findings/issues during SN visit:  pt eating breakfast, total feed, no needs or concerns    Medication refills ordered this visit: none    Medications reconciled and all medications are available in the home this visit.  The following education was provided regarding medications, medication interactions, and look alike medications.  Response to teaching: verbalized understanding. Medications are effective at this time.      Supplies by type and quantity ordered this visit include: none    Consulted medical director/attending physician regarding: N/A    Instructed patient/family/caregiver on 24-hour hospice availability and phone number.    Plan for next visit:  monitor skin

## 2024-07-18 ENCOUNTER — HOME CARE VISIT (OUTPATIENT)
Age: 89
End: 2024-07-18
Payer: MEDICARE

## 2024-07-18 PROCEDURE — G0156 HHCP-SVS OF AIDE,EA 15 MIN: HCPCS

## 2024-07-22 ENCOUNTER — HOME CARE VISIT (OUTPATIENT)
Age: 89
End: 2024-07-22
Payer: MEDICARE

## 2024-07-22 PROCEDURE — G0156 HHCP-SVS OF AIDE,EA 15 MIN: HCPCS

## 2024-07-24 ENCOUNTER — HOME CARE VISIT (OUTPATIENT)
Age: 89
End: 2024-07-24
Payer: MEDICARE

## 2024-07-24 VITALS
DIASTOLIC BLOOD PRESSURE: 48 MMHG | OXYGEN SATURATION: 97 % | HEART RATE: 77 BPM | RESPIRATION RATE: 17 BRPM | SYSTOLIC BLOOD PRESSURE: 94 MMHG

## 2024-07-24 PROCEDURE — G0299 HHS/HOSPICE OF RN EA 15 MIN: HCPCS

## 2024-07-24 PROCEDURE — G0156 HHCP-SVS OF AIDE,EA 15 MIN: HCPCS

## 2024-07-24 ASSESSMENT — ENCOUNTER SYMPTOMS: BOWEL INCONTINENCE: 1

## 2024-07-25 NOTE — HOSPICE
Patient/Caregiver/Family/Facility findings/issues during SN visit:   n/a    Medication refills ordered this visit: n/a    Medications reconciled and all medications are in the home this visit.  The following education was provided regarding medications, medication interactions, and look alike medications.  Response to teaching: caregiver verbalized understanding. Medications are effective at this time.      Supplies by type and quantity ordered this visit include: Left breifs at bedside    Consulted medical director/attending physician regarding: n/a    Instructed patient/family/caregiver on 24-hour hospice availability and phone number.    Plan for next visit:  follow up

## 2024-07-26 ENCOUNTER — HOME CARE VISIT (OUTPATIENT)
Age: 89
End: 2024-07-26
Payer: MEDICARE

## 2024-07-26 VITALS
DIASTOLIC BLOOD PRESSURE: 51 MMHG | TEMPERATURE: 98.2 F | HEART RATE: 72 BPM | RESPIRATION RATE: 16 BRPM | OXYGEN SATURATION: 98 % | SYSTOLIC BLOOD PRESSURE: 100 MMHG

## 2024-07-26 PROCEDURE — G0299 HHS/HOSPICE OF RN EA 15 MIN: HCPCS

## 2024-07-30 ENCOUNTER — HOME CARE VISIT (OUTPATIENT)
Age: 89
End: 2024-07-30
Payer: MEDICARE

## 2024-07-30 PROCEDURE — G0299 HHS/HOSPICE OF RN EA 15 MIN: HCPCS

## 2024-07-30 PROCEDURE — G0156 HHCP-SVS OF AIDE,EA 15 MIN: HCPCS

## 2024-07-30 ASSESSMENT — ENCOUNTER SYMPTOMS: BOWEL INCONTINENCE: 1

## 2024-07-30 NOTE — HOSPICE
Patient/Caregiver/Family/Facility findings/issues during SN visit:  no needs or concerns from facility, pt awake and alert in bed drinking an Ensure    Medication refills ordered this visit: none    Medications reconciled and all medications are available in the home this visit.  The following education was provided regarding medications, medication interactions, and look alike medications.  Response to teaching: verbalized understanding. Medications are effective at this time.      Supplies by type and quantity ordered this visit include: none    Consulted medical director/attending physician regarding: N/A    Instructed patient/family/caregiver on 24-hour hospice availability and phone number.    Plan for next visit:  monitor skin

## 2024-07-31 NOTE — HOSPICE
Patient/Caregiver/Family/Facility findings/issues during SN visit:  pt in bed, lethargic, not answering questions today. No needs or concerns from facility    Medication refills ordered this visit: none    Medications reconciled and all medications are available in the home this visit.  The following education was provided regarding medications, medication interactions, and look alike medications.  Response to teaching: verbalized understanding. Medications are effective at this time.      Supplies by type and quantity ordered this visit include: none    Consulted medical director/attending physician regarding: N/A    Instructed patient/family/caregiver on 24-hour hospice availability and phone number.    Plan for next visit:  wound care

## 2024-08-01 ENCOUNTER — HOME CARE VISIT (OUTPATIENT)
Age: 89
End: 2024-08-01
Payer: MEDICARE

## 2024-08-02 ENCOUNTER — HOME CARE VISIT (OUTPATIENT)
Age: 89
End: 2024-08-02
Payer: MEDICARE

## 2024-08-02 PROCEDURE — G0299 HHS/HOSPICE OF RN EA 15 MIN: HCPCS

## 2024-08-05 ENCOUNTER — HOME CARE VISIT (OUTPATIENT)
Age: 89
End: 2024-08-05
Payer: MEDICARE

## 2024-08-05 PROCEDURE — G0299 HHS/HOSPICE OF RN EA 15 MIN: HCPCS

## 2024-08-07 ENCOUNTER — HOME CARE VISIT (OUTPATIENT)
Age: 89
End: 2024-08-07
Payer: MEDICARE

## 2024-08-07 PROCEDURE — G0156 HHCP-SVS OF AIDE,EA 15 MIN: HCPCS

## 2024-08-07 ASSESSMENT — ENCOUNTER SYMPTOMS: BOWEL INCONTINENCE: 1

## 2024-08-07 NOTE — HOSPICE
pt in bed with eyes open, [place], unable to focus on my face, pleasant. Asked for a milkshake, drank 1 whole ensure chocolate. denied pain., wound care orders updated

## 2024-08-09 ENCOUNTER — HOME CARE VISIT (OUTPATIENT)
Age: 89
End: 2024-08-09
Payer: MEDICARE

## 2024-08-10 ENCOUNTER — HOME CARE VISIT (OUTPATIENT)
Age: 89
End: 2024-08-10
Payer: MEDICARE

## 2024-08-10 PROCEDURE — G0156 HHCP-SVS OF AIDE,EA 15 MIN: HCPCS

## 2024-08-12 ENCOUNTER — HOME CARE VISIT (OUTPATIENT)
Age: 89
End: 2024-08-12
Payer: MEDICARE

## 2024-08-12 VITALS
RESPIRATION RATE: 14 BRPM | SYSTOLIC BLOOD PRESSURE: 100 MMHG | HEART RATE: 88 BPM | TEMPERATURE: 97.4 F | OXYGEN SATURATION: 92 % | DIASTOLIC BLOOD PRESSURE: 50 MMHG

## 2024-08-12 PROCEDURE — G0299 HHS/HOSPICE OF RN EA 15 MIN: HCPCS

## 2024-08-12 ASSESSMENT — ENCOUNTER SYMPTOMS
BOWEL INCONTINENCE: 1
HEMOPTYSIS: 0

## 2024-08-12 NOTE — HOSPICE
Patient/Caregiver/Family/Facility findings/issues during SN visit:  Patient supine in bed at time of visit. Opens her eyes to voice but goes back to sleep during assessment. No signs of pain or discomfort.  Staff reports that appetite has decreased but patient will drink Ensure when offered.  No falls.  NO respiratory distress needed and not currently using oxygen.     Medication refills ordered this visit: none    Medications reconciled and all medications are available in the home this visit.  The following education was provided regarding medications, medication interactions, and look alike medications: reviewed proper use of comfort meds.   Response to teaching: staff verbalized understanding Medications are effective at this time.      Supplies by type and quantity ordered this visit include:     Consulted medical director/attending physician regarding: na    Instructed patient/family/caregiver on 24-hour hospice availability and phone number.    Plan for next visit:  assess comfort and needs of patient

## 2024-08-13 ENCOUNTER — HOME CARE VISIT (OUTPATIENT)
Age: 89
End: 2024-08-13
Payer: MEDICARE

## 2024-08-14 ENCOUNTER — HOME CARE VISIT (OUTPATIENT)
Age: 89
End: 2024-08-14
Payer: MEDICARE

## 2024-08-14 NOTE — HOSPICE
Patient was resting but she did open her eyes and acknowledged that I was there but went right back to sleep. Patient seems to be sleeping more and eating less.  will continue to follow.

## 2024-08-15 ENCOUNTER — HOME CARE VISIT (OUTPATIENT)
Age: 89
End: 2024-08-15
Payer: MEDICARE

## 2024-08-15 VITALS
OXYGEN SATURATION: 95 % | SYSTOLIC BLOOD PRESSURE: 118 MMHG | RESPIRATION RATE: 15 BRPM | TEMPERATURE: 96.4 F | HEART RATE: 78 BPM | DIASTOLIC BLOOD PRESSURE: 75 MMHG

## 2024-08-15 PROCEDURE — G0299 HHS/HOSPICE OF RN EA 15 MIN: HCPCS

## 2024-08-15 PROCEDURE — G0156 HHCP-SVS OF AIDE,EA 15 MIN: HCPCS

## 2024-08-15 ASSESSMENT — ENCOUNTER SYMPTOMS
BOWEL INCONTINENCE: 1
TROUBLE SWALLOWING: 1

## 2024-08-15 NOTE — HOSPICE
Patient resting in bed in no apparent distress on room air in the semi-fowlers position.  Patient behavior indicators negative for pain and discomfort.  Patient opens eyes to verbal stimuli, but quickly drifts back to sleep.  Patient has 2 empty four ounce containers of thicken liquids bedside and a full 8oz Ensure.  Patient appears frail and cachectic with temporal lobe wasting, eyes and cheeks sunken in.    Patient randomly speaks in her sleep - yells out, \"Bad man, lets go\" while beating on her hand.  Patient appears agitated after her outburst, however, she will not look at this nurse.    While doing vitals, patient said no when asked if she hurts and when blankets were replaced, she stated \"thank you.\"      Dressing to left foot  adted 15 Aug.  - patient states that \"is my bad foot don't touch it\".    sleeps most of the, wakes up for meds and goes back to sleep    Per staff, patient was given a laxative Tuesday and she had one yesterday.      Staff nurse denies any medication or supply needs.

## 2024-08-20 ENCOUNTER — HOME CARE VISIT (OUTPATIENT)
Age: 89
End: 2024-08-20
Payer: MEDICARE

## 2024-08-20 VITALS
OXYGEN SATURATION: 98 % | SYSTOLIC BLOOD PRESSURE: 102 MMHG | DIASTOLIC BLOOD PRESSURE: 62 MMHG | RESPIRATION RATE: 14 BRPM | HEART RATE: 80 BPM | TEMPERATURE: 98.4 F

## 2024-08-20 PROCEDURE — G0156 HHCP-SVS OF AIDE,EA 15 MIN: HCPCS

## 2024-08-20 PROCEDURE — G0299 HHS/HOSPICE OF RN EA 15 MIN: HCPCS

## 2024-08-20 ASSESSMENT — ENCOUNTER SYMPTOMS
HEMOPTYSIS: 0
BOWEL INCONTINENCE: 1

## 2024-08-20 NOTE — HOSPICE
Patient/Caregiver/Family/Facility findings/issues during SN visit:  Patient is in bed with hob elevated 45 degrees.  She is sleeping but rouses to call of her name.  She is disoriented but pleasant.  No signs of pain or shortness of breath noted.  Staff reports that patient has had no changes this week.  Sleeps most of the time and is spoon fed.  Her appetite varies but noted that when she refuses food she will usually take an ensure.  No falls or infections reported.     Medication refills ordered this visit: no meds needed    Medications reconciled and all medications are in the home this visit.  The following education was provided regarding medications, medication interactions, and look alike medications: reviewed proper indications and administration of all meds  Response to teaching: med tech verbalized understanding Medications are effective at this time.      Supplies by type and quantity ordered this visit include: no supplies needed    Consulted medical director/attending physician regarding: na    Instructed patient/family/caregiver on 24-hour hospice availability and phone number.    Plan for next visit:  Assess comfort and needs of patient.

## 2024-08-24 ENCOUNTER — HOME CARE VISIT (OUTPATIENT)
Age: 89
End: 2024-08-24
Payer: MEDICARE

## 2024-08-24 PROCEDURE — G0300 HHS/HOSPICE OF LPN EA 15 MIN: HCPCS

## 2024-08-25 VITALS
DIASTOLIC BLOOD PRESSURE: 56 MMHG | SYSTOLIC BLOOD PRESSURE: 92 MMHG | OXYGEN SATURATION: 94 % | RESPIRATION RATE: 16 BRPM | TEMPERATURE: 98 F | HEART RATE: 90 BPM

## 2024-08-26 ASSESSMENT — ENCOUNTER SYMPTOMS
SKIN LESIONS: 1
BOWEL INCONTINENCE: 1

## 2024-08-26 NOTE — HOSPICE
pt in bed with an ensure in her hand, drank 100%, denies pain, asked about having cookies, Kemar will bring her some, no needs or concerns from facility

## 2024-08-27 ENCOUNTER — HOME CARE VISIT (OUTPATIENT)
Age: 89
End: 2024-08-27
Payer: MEDICARE

## 2024-08-27 PROCEDURE — G0299 HHS/HOSPICE OF RN EA 15 MIN: HCPCS
